# Patient Record
Sex: MALE | Race: WHITE | NOT HISPANIC OR LATINO
[De-identification: names, ages, dates, MRNs, and addresses within clinical notes are randomized per-mention and may not be internally consistent; named-entity substitution may affect disease eponyms.]

---

## 2017-12-15 ENCOUNTER — APPOINTMENT (OUTPATIENT)
Dept: FAMILY MEDICINE | Facility: CLINIC | Age: 52
End: 2017-12-15
Payer: COMMERCIAL

## 2017-12-15 VITALS
HEIGHT: 67 IN | SYSTOLIC BLOOD PRESSURE: 130 MMHG | TEMPERATURE: 97.8 F | BODY MASS INDEX: 40.81 KG/M2 | DIASTOLIC BLOOD PRESSURE: 82 MMHG | WEIGHT: 260 LBS

## 2017-12-15 DIAGNOSIS — J45.909 UNSPECIFIED ASTHMA, UNCOMPLICATED: ICD-10-CM

## 2017-12-15 PROBLEM — Z00.00 ENCOUNTER FOR PREVENTIVE HEALTH EXAMINATION: Status: ACTIVE | Noted: 2017-12-15

## 2017-12-15 PROCEDURE — 99214 OFFICE O/P EST MOD 30 MIN: CPT

## 2018-01-03 ENCOUNTER — APPOINTMENT (OUTPATIENT)
Dept: FAMILY MEDICINE | Facility: CLINIC | Age: 53
End: 2018-01-03
Payer: COMMERCIAL

## 2018-01-05 ENCOUNTER — APPOINTMENT (OUTPATIENT)
Dept: FAMILY MEDICINE | Facility: CLINIC | Age: 53
End: 2018-01-05
Payer: COMMERCIAL

## 2018-01-05 ENCOUNTER — NON-APPOINTMENT (OUTPATIENT)
Age: 53
End: 2018-01-05

## 2018-01-05 VITALS
HEIGHT: 67 IN | OXYGEN SATURATION: 96 % | DIASTOLIC BLOOD PRESSURE: 80 MMHG | HEART RATE: 53 BPM | SYSTOLIC BLOOD PRESSURE: 130 MMHG | TEMPERATURE: 98 F

## 2018-01-05 DIAGNOSIS — Z87.891 PERSONAL HISTORY OF NICOTINE DEPENDENCE: ICD-10-CM

## 2018-01-05 PROCEDURE — 93000 ELECTROCARDIOGRAM COMPLETE: CPT

## 2018-01-05 PROCEDURE — 99214 OFFICE O/P EST MOD 30 MIN: CPT | Mod: 25

## 2018-04-27 ENCOUNTER — APPOINTMENT (OUTPATIENT)
Dept: FAMILY MEDICINE | Facility: CLINIC | Age: 53
End: 2018-04-27
Payer: COMMERCIAL

## 2018-04-27 VITALS
BODY MASS INDEX: 40.49 KG/M2 | HEIGHT: 67 IN | SYSTOLIC BLOOD PRESSURE: 142 MMHG | WEIGHT: 258 LBS | DIASTOLIC BLOOD PRESSURE: 100 MMHG

## 2018-04-27 DIAGNOSIS — G47.33 OBSTRUCTIVE SLEEP APNEA (ADULT) (PEDIATRIC): ICD-10-CM

## 2018-04-27 DIAGNOSIS — Z12.11 ENCOUNTER FOR SCREENING FOR MALIGNANT NEOPLASM OF COLON: ICD-10-CM

## 2018-04-27 DIAGNOSIS — Z99.89 OBSTRUCTIVE SLEEP APNEA (ADULT) (PEDIATRIC): ICD-10-CM

## 2018-04-27 PROCEDURE — 36415 COLL VENOUS BLD VENIPUNCTURE: CPT

## 2018-04-27 PROCEDURE — 99214 OFFICE O/P EST MOD 30 MIN: CPT | Mod: 25

## 2018-04-27 RX ORDER — GUAIFENESIN AND CODEINE PHOSPHATE 10; 100 MG/5ML; MG/5ML
100-10 SOLUTION ORAL
Qty: 1 | Refills: 1 | Status: DISCONTINUED | COMMUNITY
Start: 2017-12-15 | End: 2018-04-27

## 2018-04-27 RX ORDER — CEFUROXIME AXETIL 500 MG/1
500 TABLET ORAL
Qty: 14 | Refills: 0 | Status: DISCONTINUED | COMMUNITY
Start: 2017-12-15 | End: 2018-04-27

## 2018-05-25 LAB
ALBUMIN SERPL ELPH-MCNC: 4.2 G/DL
ALP BLD-CCNC: 40 U/L
ALT SERPL-CCNC: 29 U/L
ANION GAP SERPL CALC-SCNC: 16 MMOL/L
AST SERPL-CCNC: 59 U/L
BASOPHILS # BLD AUTO: 0.03 K/UL
BASOPHILS NFR BLD AUTO: 0.5 %
BILIRUB SERPL-MCNC: 1 MG/DL
BUN SERPL-MCNC: 11 MG/DL
CALCIUM SERPL-MCNC: 9 MG/DL
CHLORIDE SERPL-SCNC: 100 MMOL/L
CHOLEST SERPL-MCNC: 202 MG/DL
CHOLEST/HDLC SERPL: 3 RATIO
CO2 SERPL-SCNC: 25 MMOL/L
CREAT SERPL-MCNC: 0.77 MG/DL
EOSINOPHIL # BLD AUTO: 0.2 K/UL
EOSINOPHIL NFR BLD AUTO: 3.6 %
ESTIMATED AVERAGE GLUCOSE: 117 MG/DL
GLUCOSE SERPL-MCNC: 73 MG/DL
HBA1C MFR BLD HPLC: 5.7 %
HCT VFR BLD CALC: 45.9 %
HDLC SERPL-MCNC: 67 MG/DL
HGB BLD-MCNC: 15.3 G/DL
IMM GRANULOCYTES NFR BLD AUTO: 0 %
LDLC SERPL CALC-MCNC: 108 MG/DL
LYMPHOCYTES # BLD AUTO: 1.35 K/UL
LYMPHOCYTES NFR BLD AUTO: 24.6 %
MAN DIFF?: NORMAL
MCHC RBC-ENTMCNC: 33.3 GM/DL
MCHC RBC-ENTMCNC: 33.8 PG
MCV RBC AUTO: 101.3 FL
MONOCYTES # BLD AUTO: 0.47 K/UL
MONOCYTES NFR BLD AUTO: 8.6 %
NEUTROPHILS # BLD AUTO: 3.43 K/UL
NEUTROPHILS NFR BLD AUTO: 62.7 %
PLATELET # BLD AUTO: 175 K/UL
POTASSIUM SERPL-SCNC: 4.2 MMOL/L
PROT SERPL-MCNC: 7.2 G/DL
PSA SERPL-MCNC: 0.89 NG/ML
RBC # BLD: 4.53 M/UL
RBC # FLD: 12.6 %
SODIUM SERPL-SCNC: 141 MMOL/L
TRIGL SERPL-MCNC: 137 MG/DL
WBC # FLD AUTO: 5.48 K/UL

## 2018-07-27 ENCOUNTER — APPOINTMENT (OUTPATIENT)
Dept: FAMILY MEDICINE | Facility: CLINIC | Age: 53
End: 2018-07-27
Payer: COMMERCIAL

## 2018-07-27 VITALS
WEIGHT: 254 LBS | SYSTOLIC BLOOD PRESSURE: 140 MMHG | BODY MASS INDEX: 39.87 KG/M2 | DIASTOLIC BLOOD PRESSURE: 84 MMHG | HEIGHT: 67 IN

## 2018-07-27 PROCEDURE — 99214 OFFICE O/P EST MOD 30 MIN: CPT

## 2018-07-27 NOTE — HISTORY OF PRESENT ILLNESS
[FreeTextEntry1] : the patient is here for a follow up to get prescription to make him stop drinking. cardiologist referred him to come to primary [de-identified] : as per cc/ Was drinking about half a bottle of Tequila daily. Last etoh consumption was rum 3 days ago

## 2018-07-27 NOTE — REVIEW OF SYSTEMS
[Fatigue] : fatigue [Palpitations] : palpitations [Lower Ext Edema] : lower extremity edema [Paroysmal Nocturnal Dyspnea] : paroysmal nocturnal dyspnea [Shortness Of Breath] : shortness of breath [Dyspnea on Exertion] : dyspnea on exertion

## 2018-07-27 NOTE — PLAN
[FreeTextEntry1] : librium 25 tid for 5 days then bid for 5 days and rto. Advised counseling but states his work schedule makes it difficult to go

## 2018-07-27 NOTE — PHYSICAL EXAM
[No Acute Distress] : no acute distress [Well-Appearing] : well-appearing [No JVD] : no jugular venous distention [Supple] : supple [No Respiratory Distress] : no respiratory distress  [Clear to Auscultation] : lungs were clear to auscultation bilaterally [No Carotid Bruits] : no carotid bruits [de-identified] : obese [de-identified] : irreg irreg.

## 2018-08-24 ENCOUNTER — APPOINTMENT (OUTPATIENT)
Dept: FAMILY MEDICINE | Facility: CLINIC | Age: 53
End: 2018-08-24

## 2019-02-22 ENCOUNTER — RX RENEWAL (OUTPATIENT)
Age: 54
End: 2019-02-22

## 2019-05-09 ENCOUNTER — APPOINTMENT (OUTPATIENT)
Dept: FAMILY MEDICINE | Facility: CLINIC | Age: 54
End: 2019-05-09
Payer: COMMERCIAL

## 2019-05-09 VITALS
WEIGHT: 230 LBS | SYSTOLIC BLOOD PRESSURE: 80 MMHG | DIASTOLIC BLOOD PRESSURE: 42 MMHG | BODY MASS INDEX: 36.1 KG/M2 | HEIGHT: 67 IN

## 2019-05-09 DIAGNOSIS — R42 DIZZINESS AND GIDDINESS: ICD-10-CM

## 2019-05-09 DIAGNOSIS — I95.9 HYPOTENSION, UNSPECIFIED: ICD-10-CM

## 2019-05-09 PROCEDURE — 99214 OFFICE O/P EST MOD 30 MIN: CPT | Mod: 25

## 2019-05-09 PROCEDURE — 36415 COLL VENOUS BLD VENIPUNCTURE: CPT

## 2019-05-09 RX ORDER — CHLORDIAZEPOXIDE HYDROCHLORIDE 25 MG/1
25 CAPSULE ORAL 3 TIMES DAILY
Qty: 60 | Refills: 0 | Status: DISCONTINUED | COMMUNITY
Start: 2018-07-27 | End: 2019-05-09

## 2019-05-11 LAB
ALBUMIN SERPL ELPH-MCNC: 4.1 G/DL
ALP BLD-CCNC: 42 U/L
ALT SERPL-CCNC: 42 U/L
ANION GAP SERPL CALC-SCNC: 24 MMOL/L
AST SERPL-CCNC: 90 U/L
BASOPHILS # BLD AUTO: 0.07 K/UL
BASOPHILS NFR BLD AUTO: 0.8 %
BILIRUB SERPL-MCNC: 1.1 MG/DL
BUN SERPL-MCNC: 52 MG/DL
CALCIUM SERPL-MCNC: 9.7 MG/DL
CHLORIDE SERPL-SCNC: 82 MMOL/L
CHOLEST SERPL-MCNC: 150 MG/DL
CHOLEST/HDLC SERPL: 2.7 RATIO
CO2 SERPL-SCNC: 24 MMOL/L
CREAT SERPL-MCNC: 2.9 MG/DL
EOSINOPHIL # BLD AUTO: 0.44 K/UL
EOSINOPHIL NFR BLD AUTO: 5 %
ESTIMATED AVERAGE GLUCOSE: 97 MG/DL
FOLATE SERPL-MCNC: 3.8 NG/ML
GLUCOSE SERPL-MCNC: 83 MG/DL
HBA1C MFR BLD HPLC: 5 %
HCT VFR BLD CALC: 44.6 %
HDLC SERPL-MCNC: 55 MG/DL
HGB BLD-MCNC: 15.3 G/DL
IMM GRANULOCYTES NFR BLD AUTO: 0.6 %
LDLC SERPL CALC-MCNC: 72 MG/DL
LYMPHOCYTES # BLD AUTO: 1.51 K/UL
LYMPHOCYTES NFR BLD AUTO: 17.1 %
MAN DIFF?: NORMAL
MCHC RBC-ENTMCNC: 34.3 GM/DL
MCHC RBC-ENTMCNC: 35.1 PG
MCV RBC AUTO: 102.3 FL
MONOCYTES # BLD AUTO: 0.71 K/UL
MONOCYTES NFR BLD AUTO: 8 %
NEUTROPHILS # BLD AUTO: 6.05 K/UL
NEUTROPHILS NFR BLD AUTO: 68.5 %
PLATELET # BLD AUTO: 284 K/UL
POTASSIUM SERPL-SCNC: 4.1 MMOL/L
PROT SERPL-MCNC: 7.3 G/DL
RBC # BLD: 4.36 M/UL
RBC # FLD: 12 %
SODIUM SERPL-SCNC: 130 MMOL/L
TRIGL SERPL-MCNC: 115 MG/DL
VIT B12 SERPL-MCNC: 794 PG/ML
WBC # FLD AUTO: 8.83 K/UL

## 2019-05-13 NOTE — PHYSICAL EXAM
[No Acute Distress] : no acute distress [Well-Appearing] : well-appearing [No JVD] : no jugular venous distention [Supple] : supple [No Respiratory Distress] : no respiratory distress  [Clear to Auscultation] : lungs were clear to auscultation bilaterally [No Carotid Bruits] : no carotid bruits [de-identified] : obese [de-identified] : irreg irreg.

## 2019-05-13 NOTE — HISTORY OF PRESENT ILLNESS
[de-identified] : patient has been feeling dizziness since Tuesday. b/p was very low. he reports both his hands feel very numb, he thinks its from the low blood pressure. \par \par states he has stopped etoh [FreeTextEntry1] : patient is here for blood work\par

## 2019-05-13 NOTE — REVIEW OF SYSTEMS
[Fatigue] : fatigue [Palpitations] : palpitations [Lower Ext Edema] : lower extremity edema [Shortness Of Breath] : shortness of breath [Paroysmal Nocturnal Dyspnea] : paroysmal nocturnal dyspnea [Dyspnea on Exertion] : dyspnea on exertion

## 2019-05-14 LAB
CK BB SERPL ELPH-CCNC: 0 % (ref 0–?)
CK MB CFR SERPL ELPH: 0 %
CK MM SERPL ELPH-CCNC: 100 %
CREATINE KINASE,TOTAL,SERUM: 60 U/L
MACRO TYPE 1: 0 %
MACRO TYPE 2: 0 %

## 2019-05-15 ENCOUNTER — LABORATORY RESULT (OUTPATIENT)
Age: 54
End: 2019-05-15

## 2019-05-15 ENCOUNTER — APPOINTMENT (OUTPATIENT)
Dept: NEPHROLOGY | Facility: CLINIC | Age: 54
End: 2019-05-15
Payer: COMMERCIAL

## 2019-05-15 VITALS
WEIGHT: 230 LBS | SYSTOLIC BLOOD PRESSURE: 124 MMHG | DIASTOLIC BLOOD PRESSURE: 68 MMHG | BODY MASS INDEX: 36.1 KG/M2 | HEIGHT: 67 IN

## 2019-05-15 DIAGNOSIS — F41.9 ANXIETY DISORDER, UNSPECIFIED: ICD-10-CM

## 2019-05-15 PROCEDURE — 99245 OFF/OP CONSLTJ NEW/EST HI 55: CPT

## 2019-05-15 NOTE — PHYSICAL EXAM
[General Appearance - Alert] : alert [Neck Appearance] : the appearance of the neck was normal [Outer Ear] : the ears and nose were normal in appearance [Sclera] : the sclera and conjunctiva were normal [] : no respiratory distress [Neck Cervical Mass (___cm)] : no neck mass was observed [Heart Rate And Rhythm] : heart rate was normal and rhythm regular [Respiration, Rhythm And Depth] : normal respiratory rhythm and effort [Auscultation Breath Sounds / Voice Sounds] : lungs were clear to auscultation bilaterally [Arterial Pulses Carotid] : carotid pulses were normal with no bruits [Bowel Sounds] : normal bowel sounds [Heart Sounds] : normal S1 and S2 [Abdomen Tenderness] : non-tender [Abdomen Soft] : soft [Cervical Lymph Nodes Enlarged Posterior Bilaterally] : posterior cervical [Abnormal Walk] : normal gait [Skin Color & Pigmentation] : normal skin color and pigmentation [Cranial Nerves] : cranial nerves 2-12 were intact [Oriented To Time, Place, And Person] : oriented to person, place, and time

## 2019-05-15 NOTE — HISTORY OF PRESENT ILLNESS
[FreeTextEntry1] : Patient is a 53 year old male with history of HTN for four years, AFib on digoxin, chronic ETOH use of 2 bottles of vodka a week, eats one meal daily; here for initial evaluation of elevated SCr and hyponatremia. Patient states he drastically changed his diet and changed his BP meds recently; was on bisoprolol-HCTZ and lisinopril - HCTZ. Was hypotensive last week at home; until he came to see Dr. Bar who held his BP meds. No complaints; feels much better now; reports low water intake.

## 2019-05-15 NOTE — ASSESSMENT
[FreeTextEntry1] : 1) ATN\par 2) Hypotension\par 3) Hyponatremia\par 4) Acidosis\par \par ATN in the setting of hypotension; low perfusion\par Appropriately held ACEI and HCTZ\par Hyponatremia likely from HCTZ;\par Continue holding BP meds;\par Renally dose digoxin; seeing cardiologist tonight\par Will repeat blood work; renal panel, UA, urine pro/cr ratio\par May need reintroduction of BP meds eventually\par US renal\par \par RTC 2 weeks

## 2019-05-15 NOTE — REVIEW OF SYSTEMS
[Fever] : no fever [Eye Pain] : no eye pain [Chills] : no chills [Red Eyes] : eyes not red [Earache] : no earache [Loss Of Hearing] : no hearing loss [Heart Rate Is Fast] : the heart rate was not fast [Heart Rate Is Slow] : the heart rate was not slow [Wheezing] : no wheezing [Shortness Of Breath] : no shortness of breath [Abdominal Pain] : no abdominal pain [Vomiting] : no vomiting [Arthralgias] : no arthralgias [Skin Lesions] : no skin lesions [Skin Wound] : no skin wound [Joint Pain] : no joint pain [Proptosis] : no proptosis [Confused] : no confusion [Convulsions] : no convulsions [Hot Flashes] : no hot flashes [Easy Bleeding] : no tendency for easy bleeding [Easy Bruising] : no tendency for easy bruising [Negative] : Endocrine

## 2019-05-16 LAB
ALBUMIN SERPL ELPH-MCNC: 4.3 G/DL
ANION GAP SERPL CALC-SCNC: 21 MMOL/L
BASOPHILS # BLD AUTO: 0.07 K/UL
BASOPHILS NFR BLD AUTO: 0.9 %
BUN SERPL-MCNC: 19 MG/DL
CALCIUM SERPL-MCNC: 9.8 MG/DL
CHLORIDE SERPL-SCNC: 90 MMOL/L
CO2 SERPL-SCNC: 24 MMOL/L
CREAT SERPL-MCNC: 1.08 MG/DL
EOSINOPHIL # BLD AUTO: 0.42 K/UL
EOSINOPHIL NFR BLD AUTO: 5.5 %
GLUCOSE SERPL-MCNC: 86 MG/DL
HCT VFR BLD CALC: 43.1 %
HGB BLD-MCNC: 14.2 G/DL
IMM GRANULOCYTES NFR BLD AUTO: 0.4 %
LYMPHOCYTES # BLD AUTO: 1.85 K/UL
LYMPHOCYTES NFR BLD AUTO: 24 %
MAN DIFF?: NORMAL
MCHC RBC-ENTMCNC: 32.9 GM/DL
MCHC RBC-ENTMCNC: 35.1 PG
MCV RBC AUTO: 106.7 FL
MONOCYTES # BLD AUTO: 0.65 K/UL
MONOCYTES NFR BLD AUTO: 8.4 %
NEUTROPHILS # BLD AUTO: 4.68 K/UL
NEUTROPHILS NFR BLD AUTO: 60.8 %
PHOSPHATE SERPL-MCNC: 3.5 MG/DL
PLATELET # BLD AUTO: 205 K/UL
POTASSIUM SERPL-SCNC: 4.5 MMOL/L
RBC # BLD: 4.04 M/UL
RBC # FLD: 12 %
SODIUM SERPL-SCNC: 134 MMOL/L
WBC # FLD AUTO: 7.7 K/UL

## 2019-05-22 ENCOUNTER — FORM ENCOUNTER (OUTPATIENT)
Age: 54
End: 2019-05-22

## 2019-05-23 ENCOUNTER — OUTPATIENT (OUTPATIENT)
Dept: OUTPATIENT SERVICES | Facility: HOSPITAL | Age: 54
LOS: 1 days | End: 2019-05-23
Payer: COMMERCIAL

## 2019-05-23 ENCOUNTER — APPOINTMENT (OUTPATIENT)
Dept: ULTRASOUND IMAGING | Facility: CLINIC | Age: 54
End: 2019-05-23
Payer: COMMERCIAL

## 2019-05-23 DIAGNOSIS — Z00.8 ENCOUNTER FOR OTHER GENERAL EXAMINATION: ICD-10-CM

## 2019-05-23 PROCEDURE — 76775 US EXAM ABDO BACK WALL LIM: CPT | Mod: 26

## 2019-05-23 PROCEDURE — 76775 US EXAM ABDO BACK WALL LIM: CPT

## 2019-06-19 ENCOUNTER — APPOINTMENT (OUTPATIENT)
Dept: NEPHROLOGY | Facility: CLINIC | Age: 54
End: 2019-06-19
Payer: COMMERCIAL

## 2019-06-19 VITALS
WEIGHT: 230 LBS | HEIGHT: 67 IN | DIASTOLIC BLOOD PRESSURE: 68 MMHG | BODY MASS INDEX: 36.1 KG/M2 | SYSTOLIC BLOOD PRESSURE: 130 MMHG

## 2019-06-19 PROCEDURE — 99215 OFFICE O/P EST HI 40 MIN: CPT

## 2019-06-19 NOTE — ASSESSMENT
[FreeTextEntry1] : 1) ATN\par 2) Hypotension\par 3) Hyponatremia\par 4) Acidosis\par \par ATN in the setting of hypotension; low perfusion\par Appropriately held ACEI and HCTZ\par Hyponatremia likely from HCTZ;\par Renally dose digoxin; seeing cardiologist tonight\par BP well controlled on metoprolol\par Will not increase dose\par Cr normalized\par Na improved\par Renal US normal\par \par RTC PRN

## 2019-06-19 NOTE — REVIEW OF SYSTEMS
[Fever] : no fever [Chills] : no chills [Eye Pain] : no eye pain [Red Eyes] : eyes not red [Earache] : no earache [Loss Of Hearing] : no hearing loss [Heart Rate Is Slow] : the heart rate was not slow [Heart Rate Is Fast] : the heart rate was not fast [Shortness Of Breath] : no shortness of breath [Wheezing] : no wheezing [Abdominal Pain] : no abdominal pain [Vomiting] : no vomiting [Arthralgias] : no arthralgias [Joint Pain] : no joint pain [Skin Lesions] : no skin lesions [Skin Wound] : no skin wound [Confused] : no confusion [Hot Flashes] : no hot flashes [Convulsions] : no convulsions [Proptosis] : no proptosis [Easy Bleeding] : no tendency for easy bleeding [Easy Bruising] : no tendency for easy bruising [Negative] : Heme/Lymph

## 2019-06-19 NOTE — PHYSICAL EXAM
[Sclera] : the sclera and conjunctiva were normal [General Appearance - Alert] : alert [Outer Ear] : the ears and nose were normal in appearance [Neck Appearance] : the appearance of the neck was normal [Neck Cervical Mass (___cm)] : no neck mass was observed [] : no respiratory distress [Respiration, Rhythm And Depth] : normal respiratory rhythm and effort [Heart Rate And Rhythm] : heart rate was normal and rhythm regular [Auscultation Breath Sounds / Voice Sounds] : lungs were clear to auscultation bilaterally [Heart Sounds] : normal S1 and S2 [Arterial Pulses Carotid] : carotid pulses were normal with no bruits [Abdomen Soft] : soft [Abdomen Tenderness] : non-tender [Bowel Sounds] : normal bowel sounds [Cervical Lymph Nodes Enlarged Posterior Bilaterally] : posterior cervical [Abnormal Walk] : normal gait [Skin Color & Pigmentation] : normal skin color and pigmentation [Cranial Nerves] : cranial nerves 2-12 were intact [Oriented To Time, Place, And Person] : oriented to person, place, and time

## 2019-07-08 ENCOUNTER — APPOINTMENT (OUTPATIENT)
Dept: FAMILY MEDICINE | Facility: CLINIC | Age: 54
End: 2019-07-08
Payer: COMMERCIAL

## 2019-07-08 VITALS
WEIGHT: 230 LBS | BODY MASS INDEX: 36.1 KG/M2 | SYSTOLIC BLOOD PRESSURE: 130 MMHG | DIASTOLIC BLOOD PRESSURE: 80 MMHG | HEIGHT: 67 IN

## 2019-07-08 DIAGNOSIS — G47.00 INSOMNIA, UNSPECIFIED: ICD-10-CM

## 2019-07-08 PROCEDURE — 99213 OFFICE O/P EST LOW 20 MIN: CPT

## 2019-07-09 NOTE — HISTORY OF PRESENT ILLNESS
[FreeTextEntry8] : Patient is here for fatigue. Pt has not been able to sleep for last few days. Pt states he has been up since friday, has gotten no sleep, was not able to go to work today due to extreme fatigue and high bp

## 2019-07-09 NOTE — PHYSICAL EXAM
[No Carotid Bruits] : no carotid bruits [Normal] : normal rate, regular rhythm, normal S1 and S2 and no murmur heard [No Edema] : there was no peripheral edema

## 2019-07-09 NOTE — REVIEW OF SYSTEMS
[Fatigue] : fatigue [Negative] : Respiratory [Fever] : no fever [Hot Flashes] : no hot flashes [Chills] : no chills [Night Sweats] : no night sweats [Recent Change In Weight] : ~T no recent weight change [FreeTextEntry4] : Has dental pain and will be having dental work

## 2019-07-09 NOTE — PLAN
[FreeTextEntry1] : Pt is positive for alcoholism. States he has cut down\par Will let me know if hydroxyzine works.

## 2019-07-23 ENCOUNTER — RECORD ABSTRACTING (OUTPATIENT)
Age: 54
End: 2019-07-23

## 2019-07-23 DIAGNOSIS — J30.2 OTHER SEASONAL ALLERGIC RHINITIS: ICD-10-CM

## 2019-07-23 DIAGNOSIS — F17.200 NICOTINE DEPENDENCE, UNSPECIFIED, UNCOMPLICATED: ICD-10-CM

## 2019-07-23 DIAGNOSIS — Z87.898 PERSONAL HISTORY OF OTHER SPECIFIED CONDITIONS: ICD-10-CM

## 2019-07-23 DIAGNOSIS — Z80.1 FAMILY HISTORY OF MALIGNANT NEOPLASM OF TRACHEA, BRONCHUS AND LUNG: ICD-10-CM

## 2019-07-23 DIAGNOSIS — Z78.9 OTHER SPECIFIED HEALTH STATUS: ICD-10-CM

## 2019-07-23 DIAGNOSIS — Z92.89 PERSONAL HISTORY OF OTHER MEDICAL TREATMENT: ICD-10-CM

## 2019-07-23 DIAGNOSIS — Z86.69 PERSONAL HISTORY OF OTHER DISEASES OF THE NERVOUS SYSTEM AND SENSE ORGANS: ICD-10-CM

## 2019-07-23 DIAGNOSIS — Z80.6 FAMILY HISTORY OF LEUKEMIA: ICD-10-CM

## 2019-07-23 DIAGNOSIS — Z80.0 FAMILY HISTORY OF MALIGNANT NEOPLASM OF DIGESTIVE ORGANS: ICD-10-CM

## 2019-07-23 DIAGNOSIS — Z80.8 FAMILY HISTORY OF MALIGNANT NEOPLASM OF OTHER ORGANS OR SYSTEMS: ICD-10-CM

## 2019-07-23 RX ORDER — BACILLUS COAGULANS/INULIN 1B-250 MG
CAPSULE ORAL
Refills: 0 | Status: ACTIVE | COMMUNITY

## 2019-07-23 RX ORDER — CHORIONIC GONADOTROPIN, HUMAN 50000 UNIT
VIAL (EA) INJECTION
Refills: 0 | Status: ACTIVE | COMMUNITY

## 2019-09-10 ENCOUNTER — APPOINTMENT (OUTPATIENT)
Dept: FAMILY MEDICINE | Facility: CLINIC | Age: 54
End: 2019-09-10
Payer: COMMERCIAL

## 2019-09-10 VITALS
OXYGEN SATURATION: 97 % | WEIGHT: 230 LBS | HEART RATE: 70 BPM | DIASTOLIC BLOOD PRESSURE: 76 MMHG | HEIGHT: 67 IN | BODY MASS INDEX: 36.1 KG/M2 | SYSTOLIC BLOOD PRESSURE: 122 MMHG

## 2019-09-10 PROCEDURE — 99213 OFFICE O/P EST LOW 20 MIN: CPT

## 2019-09-10 NOTE — HISTORY OF PRESENT ILLNESS
[FreeTextEntry8] : Momo francheska 53-year old male who presents with atrial fibrillation. Earlier today he reports that his HR and BP were both elevated. He reports that this occurs sporadically but usually lasts for 20 minutes; however, this time it lasted for 3-4 hours. He feels better right now.\par Reports that nothing specifically triggers these episodes; however, sitting down and relaxing usually helps.\par Has an appointment with cardiology tomorrow. Reports that his cardiologist have wanted him to have a pacemaker placed for a very long, however, he has always refused.\par Reports compliance with medication.\par Asymptomatic currently.\par \par \par patient presents today for high b/p and heart rate. comes and goes. today at 8 am bp was 177/146. heart rate was 146. before coming here 146/107. heart rate was 107

## 2019-09-10 NOTE — PHYSICAL EXAM
[Normal] : no acute distress, well nourished, well developed and well-appearing [Normal Sclera/Conjunctiva] : normal sclera/conjunctiva [No Respiratory Distress] : no respiratory distress  [Normal Outer Ear/Nose] : the outer ears and nose were normal in appearance [No Accessory Muscle Use] : no accessory muscle use [Clear to Auscultation] : lungs were clear to auscultation bilaterally [Normal Rate] : normal rate  [Regular Rhythm] : with a regular rhythm [Normal S1, S2] : normal S1 and S2 [No Edema] : there was no peripheral edema [No Extremity Clubbing/Cyanosis] : no extremity clubbing/cyanosis [Non-distended] : non-distended [Coordination Grossly Intact] : coordination grossly intact [Alert and Oriented x3] : oriented to person, place, and time

## 2019-10-10 ENCOUNTER — LABORATORY RESULT (OUTPATIENT)
Age: 54
End: 2019-10-10

## 2019-10-10 ENCOUNTER — APPOINTMENT (OUTPATIENT)
Dept: FAMILY MEDICINE | Facility: CLINIC | Age: 54
End: 2019-10-10
Payer: COMMERCIAL

## 2019-10-10 DIAGNOSIS — E66.01 MORBID (SEVERE) OBESITY DUE TO EXCESS CALORIES: ICD-10-CM

## 2019-10-10 PROCEDURE — 36415 COLL VENOUS BLD VENIPUNCTURE: CPT

## 2019-10-11 LAB
ALBUMIN SERPL ELPH-MCNC: 4.3 G/DL
ALP BLD-CCNC: 51 U/L
ALT SERPL-CCNC: 32 U/L
ANION GAP SERPL CALC-SCNC: 19 MMOL/L
AST SERPL-CCNC: 92 U/L
BILIRUB SERPL-MCNC: 1 MG/DL
BUN SERPL-MCNC: 12 MG/DL
CALCIUM SERPL-MCNC: 9.4 MG/DL
CHLORIDE SERPL-SCNC: 92 MMOL/L
CHOLEST SERPL-MCNC: 147 MG/DL
CHOLEST/HDLC SERPL: 2.5 RATIO
CK SERPL-CCNC: 54 U/L
CO2 SERPL-SCNC: 25 MMOL/L
CREAT SERPL-MCNC: 0.72 MG/DL
GLUCOSE SERPL-MCNC: 102 MG/DL
HDLC SERPL-MCNC: 60 MG/DL
LDLC SERPL CALC-MCNC: 71 MG/DL
POTASSIUM SERPL-SCNC: 3.8 MMOL/L
PROT SERPL-MCNC: 6.8 G/DL
SODIUM SERPL-SCNC: 136 MMOL/L
TRIGL SERPL-MCNC: 79 MG/DL

## 2019-10-16 ENCOUNTER — RX RENEWAL (OUTPATIENT)
Age: 54
End: 2019-10-16

## 2019-10-25 LAB
BASOPHILS # BLD AUTO: 0.15 K/UL
BASOPHILS NFR BLD AUTO: 2.7 %
EOSINOPHIL # BLD AUTO: 0.25 K/UL
EOSINOPHIL NFR BLD AUTO: 4.5 %
ESTIMATED AVERAGE GLUCOSE: 105 MG/DL
HBA1C MFR BLD HPLC: 5.3 %
HCT VFR BLD CALC: 40.2 %
HGB BLD-MCNC: 13.4 G/DL
LYMPHOCYTES # BLD AUTO: 1.19 K/UL
LYMPHOCYTES NFR BLD AUTO: 21.4 %
MAN DIFF?: NORMAL
MCHC RBC-ENTMCNC: 33.3 GM/DL
MCHC RBC-ENTMCNC: 35.9 PG
MCV RBC AUTO: 107.8 FL
MONOCYTES # BLD AUTO: 0.34 K/UL
MONOCYTES NFR BLD AUTO: 6.2 %
NEUTROPHILS # BLD AUTO: 3.61 K/UL
NEUTROPHILS NFR BLD AUTO: 65.2 %
PLATELET # BLD AUTO: 173 K/UL
RBC # BLD: 3.73 M/UL
RBC # FLD: 13.6 %
WBC # FLD AUTO: 5.54 K/UL

## 2020-02-17 ENCOUNTER — APPOINTMENT (OUTPATIENT)
Dept: FAMILY MEDICINE | Facility: CLINIC | Age: 55
End: 2020-02-17
Payer: SELF-PAY

## 2020-02-17 VITALS
DIASTOLIC BLOOD PRESSURE: 74 MMHG | BODY MASS INDEX: 35.79 KG/M2 | WEIGHT: 228 LBS | OXYGEN SATURATION: 96 % | HEART RATE: 97 BPM | HEIGHT: 67 IN | SYSTOLIC BLOOD PRESSURE: 130 MMHG

## 2020-02-17 DIAGNOSIS — Z87.09 PERSONAL HISTORY OF OTHER DISEASES OF THE RESPIRATORY SYSTEM: ICD-10-CM

## 2020-02-17 PROCEDURE — 99214 OFFICE O/P EST MOD 30 MIN: CPT

## 2020-02-17 NOTE — HISTORY OF PRESENT ILLNESS
[Cough] : cough [Wheezing] : no wheezing [Shortness Of Breath] : no shortness of breath [Sore Throat] : no sore throat [Fever] : no fever [de-identified] : bronchitis  [Headache] : no headache [FreeTextEntry1] : a week ago. [FreeTextEntry8] : Pt is alcoholic, going to [facility] and trying to taper [FreeTextEntry2] : chest congestion/pressure.

## 2020-02-17 NOTE — PLAN
[FreeTextEntry1] : Increase water , dc etoh.\par Pt asking about meds to decrease etoh.\par Advised to schedule for a full physical and will discuss at that time

## 2020-02-17 NOTE — PHYSICAL EXAM
[Ill-Appearing] : ill-appearing [Alert and Oriented x3] : oriented to person, place, and time [Normal] : normal rate, regular rhythm, normal S1 and S2 and no murmur heard [de-identified] : RAMSES [de-identified] : congested [de-identified] : coarse BS bilat, few ronchi and wheezes

## 2020-02-17 NOTE — REVIEW OF SYSTEMS
[Cough] : cough [Wheezing] : wheezing [Anxiety] : anxiety [Insomnia] : insomnia [Negative] : ENT [Shortness Of Breath] : no shortness of breath [FreeTextEntry4] : congested [Suicidal] : not suicidal [Dyspnea on Exertion] : not dyspnea on exertion

## 2020-03-26 RX ORDER — DOXYCYCLINE HYCLATE 100 MG/1
100 CAPSULE ORAL TWICE DAILY
Qty: 10 | Refills: 0 | Status: DISCONTINUED | COMMUNITY
Start: 2020-02-17 | End: 2020-03-26

## 2020-04-03 ENCOUNTER — APPOINTMENT (OUTPATIENT)
Dept: FAMILY MEDICINE | Facility: CLINIC | Age: 55
End: 2020-04-03
Payer: COMMERCIAL

## 2020-04-03 ENCOUNTER — LABORATORY RESULT (OUTPATIENT)
Age: 55
End: 2020-04-03

## 2020-04-03 VITALS
WEIGHT: 222 LBS | HEIGHT: 67 IN | OXYGEN SATURATION: 97 % | DIASTOLIC BLOOD PRESSURE: 90 MMHG | HEART RATE: 103 BPM | BODY MASS INDEX: 34.84 KG/M2 | TEMPERATURE: 98.7 F | SYSTOLIC BLOOD PRESSURE: 118 MMHG

## 2020-04-03 PROCEDURE — 36415 COLL VENOUS BLD VENIPUNCTURE: CPT

## 2020-04-03 PROCEDURE — 99214 OFFICE O/P EST MOD 30 MIN: CPT | Mod: 25

## 2020-04-03 RX ORDER — BISOPROLOL FUMARATE AND HYDROCHLOROTHIAZIDE 10; 6.25 MG/1; MG/1
10-6.25 TABLET, FILM COATED ORAL DAILY
Refills: 0 | Status: DISCONTINUED | COMMUNITY
Start: 2019-05-09 | End: 2020-04-03

## 2020-04-03 NOTE — HISTORY OF PRESENT ILLNESS
[FreeTextEntry8] : Patient has watery eyes, phlegm in chest that is causing dry heaves, started up yesterday. Patient states they shake after these symptoms happen. No appetite. Patient states this has been going on for awhile.\par \par Still drinking but states less.\par \par On metoprolol not bisoprolol\par Other meds unchanged\par Pt has chronic afib and followed by cardiology. Last seen in October

## 2020-04-03 NOTE — REVIEW OF SYSTEMS
[Palpitations] : palpitations [Dyspnea on Exertion] : dyspnea on exertion [Vomiting] : vomiting [Anxiety] : anxiety [Negative] : ENT [Fever] : no fever [Chills] : no chills [Fatigue] : no fatigue [Hot Flashes] : no hot flashes [Night Sweats] : no night sweats [Recent Change In Weight] : ~T no recent weight change [Chest Pain] : no chest pain [Claudication] : no  leg claudication [Lower Ext Edema] : no lower extremity edema [Orthopena] : no orthopnea [Paroxysmal Nocturnal Dyspnea] : no paroxysmal nocturnal dyspnea [Shortness Of Breath] : no shortness of breath [Wheezing] : no wheezing [Cough] : no cough [Abdominal Pain] : no abdominal pain [Nausea] : no nausea [Constipation] : no constipation [Diarrhea] : no diarrhea [Heartburn] : no heartburn [Melena] : no melena [Easy Bruising] : no easy bruising [FreeTextEntry3] : states they ere yellow last week, watery [FreeTextEntry5] : rapid pulse [FreeTextEntry7] : vomits with phlegm

## 2020-04-03 NOTE — PLAN
[FreeTextEntry1] : Continue all meds but increase 100mg metoprolol to BID\par Stop alcohol\par \par Needs to follow up with cardiology

## 2020-04-03 NOTE — PHYSICAL EXAM
[No Acute Distress] : no acute distress [Well Nourished] : well nourished [Well-Appearing] : well-appearing [EOMI] : extraocular movements intact [Normal Outer Ear/Nose] : the outer ears and nose were normal in appearance [Normal Oropharynx] : the oropharynx was normal [No Lymphadenopathy] : no lymphadenopathy [Thyroid Normal, No Nodules] : the thyroid was normal and there were no nodules present [No Respiratory Distress] : no respiratory distress  [Clear to Auscultation] : lungs were clear to auscultation bilaterally [No Carotid Bruits] : no carotid bruits [No Edema] : there was no peripheral edema [Alert and Oriented x3] : oriented to person, place, and time [de-identified] : overwt [de-identified] : Mildly icteric sclera [de-identified] : irreg irreg, rate 110

## 2020-04-06 DIAGNOSIS — R17 UNSPECIFIED JAUNDICE: ICD-10-CM

## 2020-04-06 DIAGNOSIS — R74.8 ABNORMAL LEVELS OF OTHER SERUM ENZYMES: ICD-10-CM

## 2020-04-06 LAB
ALBUMIN SERPL ELPH-MCNC: 3.7 G/DL
ALP BLD-CCNC: 166 U/L
ALT SERPL-CCNC: 33 U/L
ANION GAP SERPL CALC-SCNC: 19 MMOL/L
AST SERPL-CCNC: 189 U/L
BASOPHILS # BLD AUTO: 0.04 K/UL
BASOPHILS NFR BLD AUTO: 0.7 %
BILIRUB SERPL-MCNC: 7.5 MG/DL
BUN SERPL-MCNC: 14 MG/DL
CALCIUM SERPL-MCNC: 8.7 MG/DL
CHLORIDE SERPL-SCNC: 94 MMOL/L
CHOLEST SERPL-MCNC: 129 MG/DL
CHOLEST/HDLC SERPL: 4.2 RATIO
CO2 SERPL-SCNC: 25 MMOL/L
CREAT SERPL-MCNC: 0.71 MG/DL
DIGOXIN SERPL-MCNC: 0.4 NG/ML
EOSINOPHIL # BLD AUTO: 0.03 K/UL
EOSINOPHIL NFR BLD AUTO: 0.5 %
GLUCOSE SERPL-MCNC: 107 MG/DL
HCT VFR BLD CALC: 38.7 %
HDLC SERPL-MCNC: 31 MG/DL
HGB BLD-MCNC: 13.3 G/DL
IMM GRANULOCYTES NFR BLD AUTO: 0.5 %
LDLC SERPL CALC-MCNC: 73 MG/DL
LYMPHOCYTES # BLD AUTO: 0.84 K/UL
LYMPHOCYTES NFR BLD AUTO: 14.1 %
MAN DIFF?: NORMAL
MCHC RBC-ENTMCNC: 34.4 GM/DL
MCHC RBC-ENTMCNC: 38.6 PG
MCV RBC AUTO: 112.2 FL
MONOCYTES # BLD AUTO: 0.49 K/UL
MONOCYTES NFR BLD AUTO: 8.2 %
NEUTROPHILS # BLD AUTO: 4.52 K/UL
NEUTROPHILS NFR BLD AUTO: 76 %
PLATELET # BLD AUTO: 65 K/UL
POTASSIUM SERPL-SCNC: 3.7 MMOL/L
PROT SERPL-MCNC: 6.6 G/DL
RBC # BLD: 3.45 M/UL
RBC # FLD: 14.6 %
SODIUM SERPL-SCNC: 138 MMOL/L
TRIGL SERPL-MCNC: 126 MG/DL
TSH SERPL-ACNC: 9.12 UIU/ML
WBC # FLD AUTO: 5.95 K/UL

## 2020-04-08 ENCOUNTER — OUTPATIENT (OUTPATIENT)
Dept: OUTPATIENT SERVICES | Facility: HOSPITAL | Age: 55
LOS: 1 days | End: 2020-04-08
Payer: COMMERCIAL

## 2020-04-08 ENCOUNTER — APPOINTMENT (OUTPATIENT)
Dept: ULTRASOUND IMAGING | Facility: CLINIC | Age: 55
End: 2020-04-08
Payer: COMMERCIAL

## 2020-04-08 ENCOUNTER — RESULT REVIEW (OUTPATIENT)
Age: 55
End: 2020-04-08

## 2020-04-08 DIAGNOSIS — Z00.8 ENCOUNTER FOR OTHER GENERAL EXAMINATION: ICD-10-CM

## 2020-04-08 PROCEDURE — 76700 US EXAM ABDOM COMPLETE: CPT

## 2020-04-08 PROCEDURE — 76700 US EXAM ABDOM COMPLETE: CPT | Mod: 26

## 2020-04-24 ENCOUNTER — RX RENEWAL (OUTPATIENT)
Age: 55
End: 2020-04-24

## 2020-07-23 ENCOUNTER — APPOINTMENT (OUTPATIENT)
Dept: FAMILY MEDICINE | Facility: CLINIC | Age: 55
End: 2020-07-23
Payer: COMMERCIAL

## 2020-07-23 VITALS
HEIGHT: 67 IN | DIASTOLIC BLOOD PRESSURE: 90 MMHG | SYSTOLIC BLOOD PRESSURE: 150 MMHG | OXYGEN SATURATION: 98 % | WEIGHT: 204 LBS | HEART RATE: 90 BPM | TEMPERATURE: 97.3 F | BODY MASS INDEX: 32.02 KG/M2

## 2020-07-23 DIAGNOSIS — H65.93 UNSPECIFIED NONSUPPURATIVE OTITIS MEDIA, BILATERAL: ICD-10-CM

## 2020-07-23 PROCEDURE — 99214 OFFICE O/P EST MOD 30 MIN: CPT

## 2020-07-26 PROBLEM — H65.93 BILATERAL SEROUS OTITIS MEDIA, UNSPECIFIED CHRONICITY: Status: ACTIVE | Noted: 2020-07-26

## 2020-07-26 NOTE — REVIEW OF SYSTEMS
[Palpitations] : palpitations [Dyspnea on Exertion] : dyspnea on exertion [Dizziness] : dizziness [Anxiety] : anxiety [Negative] : ENT [Fever] : no fever [Chills] : no chills [Fatigue] : no fatigue [Hot Flashes] : no hot flashes [Night Sweats] : no night sweats [Recent Change In Weight] : ~T no recent weight change [Chest Pain] : no chest pain [Claudication] : no  leg claudication [Lower Ext Edema] : no lower extremity edema [Orthopena] : no orthopnea [Paroxysmal Nocturnal Dyspnea] : no paroxysmal nocturnal dyspnea [Shortness Of Breath] : no shortness of breath [Wheezing] : no wheezing [Cough] : no cough [Diarrhea] : no diarrhea [Nausea] : no nausea [Constipation] : no constipation [Abdominal Pain] : no abdominal pain [Vomiting] : no vomiting [Heartburn] : no heartburn [Melena] : no melena [FreeTextEntry5] : rapid pulse [FreeTextEntry3] : states they ere yellow last week, watery [Easy Bruising] : no easy bruising

## 2020-07-26 NOTE — PHYSICAL EXAM
[Normal] : no carotid or abdominal bruits heard, no varicosities, pedal pulses are present, no peripheral edema, no extremity clubbing or cyanosis and no palpable aorta [de-identified] : CONGESTION AND BULGING TM

## 2020-10-24 ENCOUNTER — FORM ENCOUNTER (OUTPATIENT)
Age: 55
End: 2020-10-24

## 2020-10-25 ENCOUNTER — NON-APPOINTMENT (OUTPATIENT)
Age: 55
End: 2020-10-25

## 2020-10-25 ENCOUNTER — RESULT REVIEW (OUTPATIENT)
Age: 55
End: 2020-10-25

## 2020-10-25 ENCOUNTER — TRANSCRIPTION ENCOUNTER (OUTPATIENT)
Age: 55
End: 2020-10-25

## 2020-11-12 ENCOUNTER — RESULT REVIEW (OUTPATIENT)
Age: 55
End: 2020-11-12

## 2020-12-14 ENCOUNTER — APPOINTMENT (OUTPATIENT)
Dept: FAMILY MEDICINE | Facility: CLINIC | Age: 55
End: 2020-12-14
Payer: COMMERCIAL

## 2020-12-14 VITALS
SYSTOLIC BLOOD PRESSURE: 142 MMHG | DIASTOLIC BLOOD PRESSURE: 80 MMHG | HEART RATE: 108 BPM | TEMPERATURE: 96.8 F | HEIGHT: 67 IN | BODY MASS INDEX: 33.59 KG/M2 | WEIGHT: 214 LBS | OXYGEN SATURATION: 95 %

## 2020-12-14 DIAGNOSIS — R06.00 DYSPNEA, UNSPECIFIED: ICD-10-CM

## 2020-12-14 PROCEDURE — 36415 COLL VENOUS BLD VENIPUNCTURE: CPT

## 2020-12-14 PROCEDURE — 99072 ADDL SUPL MATRL&STAF TM PHE: CPT

## 2020-12-14 PROCEDURE — 99214 OFFICE O/P EST MOD 30 MIN: CPT | Mod: 25

## 2020-12-14 NOTE — PHYSICAL EXAM
[No Acute Distress] : no acute distress [Ill-Appearing] : ill-appearing [Normal] : normal rate, regular rhythm, normal S1 and S2 and no murmur heard [de-identified] : abdomin enlarged and pos ascites

## 2020-12-14 NOTE — HISTORY OF PRESENT ILLNESS
[FreeTextEntry8] : pt being seen for stomach pain and SOB\par pt has been tapped twice for ascites sec to etoh liver disease.\par Still was drinking up until 4 days ago!\par \par CC; just doesn’t feel well.\par Breathing heavy going up stairs\par \par Doesnt know exactly what meds he is taking

## 2020-12-14 NOTE — REVIEW OF SYSTEMS
[Palpitations] : palpitations [Dyspnea on Exertion] : dyspnea on exertion [Abdominal Pain] : abdominal pain [Nausea] : nausea [Dizziness] : dizziness [Anxiety] : anxiety [Negative] : Genitourinary [Fever] : no fever [Chills] : no chills [Fatigue] : no fatigue [Hot Flashes] : no hot flashes [Night Sweats] : no night sweats [Recent Change In Weight] : ~T no recent weight change [Chest Pain] : no chest pain [Claudication] : no  leg claudication [Lower Ext Edema] : no lower extremity edema [Orthopena] : no orthopnea [Paroxysmal Nocturnal Dyspnea] : no paroxysmal nocturnal dyspnea [Shortness Of Breath] : no shortness of breath [Wheezing] : no wheezing [Cough] : no cough [Constipation] : no constipation [Vomiting] : no vomiting [Heartburn] : no heartburn [Melena] : no melena [Easy Bruising] : no easy bruising [FreeTextEntry3] : states they ere yellow last week, watery [FreeTextEntry5] : rapid pulse

## 2020-12-14 NOTE — PLAN
[FreeTextEntry1] : pt advised to call his gastro today. Needs tap for ascites.\par Advised ER if worsening sx

## 2020-12-15 ENCOUNTER — LABORATORY RESULT (OUTPATIENT)
Age: 55
End: 2020-12-15

## 2020-12-23 PROBLEM — Z87.09 HISTORY OF ACUTE BRONCHITIS: Status: RESOLVED | Noted: 2020-02-17 | Resolved: 2020-12-23

## 2021-01-08 LAB
ALBUMIN SERPL ELPH-MCNC: 3.3 G/DL
ALP BLD-CCNC: 117 U/L
ALT SERPL-CCNC: 18 U/L
ANION GAP SERPL CALC-SCNC: 22 MMOL/L
AST SERPL-CCNC: 121 U/L
BASOPHILS # BLD AUTO: 0.03 K/UL
BASOPHILS NFR BLD AUTO: 0.4 %
BILIRUB SERPL-MCNC: 4.9 MG/DL
BUN SERPL-MCNC: 14 MG/DL
CALCIUM SERPL-MCNC: 8.5 MG/DL
CHLORIDE SERPL-SCNC: 84 MMOL/L
CHOLEST SERPL-MCNC: 142 MG/DL
CO2 SERPL-SCNC: 30 MMOL/L
CREAT SERPL-MCNC: 1.3 MG/DL
EOSINOPHIL # BLD AUTO: 0.04 K/UL
EOSINOPHIL NFR BLD AUTO: 0.6 %
FOLATE SERPL-MCNC: 7 NG/ML
GGT SERPL-CCNC: 260 U/L
GLUCOSE SERPL-MCNC: 60 MG/DL
HCT VFR BLD CALC: 36.9 %
HDLC SERPL-MCNC: 49 MG/DL
HGB BLD-MCNC: 12.6 G/DL
IMM GRANULOCYTES NFR BLD AUTO: 0.1 %
LDLC SERPL CALC-MCNC: 70 MG/DL
LYMPHOCYTES # BLD AUTO: 1.31 K/UL
LYMPHOCYTES NFR BLD AUTO: 18.9 %
MAN DIFF?: NORMAL
MCHC RBC-ENTMCNC: 34.1 GM/DL
MCHC RBC-ENTMCNC: 37.2 PG
MCV RBC AUTO: 108.8 FL
MONOCYTES # BLD AUTO: 0.41 K/UL
MONOCYTES NFR BLD AUTO: 5.9 %
NEUTROPHILS # BLD AUTO: 5.13 K/UL
NEUTROPHILS NFR BLD AUTO: 74.1 %
NONHDLC SERPL-MCNC: 93 MG/DL
PLATELET # BLD AUTO: 80 K/UL
POTASSIUM SERPL-SCNC: 3.2 MMOL/L
PROT SERPL-MCNC: 7.2 G/DL
RBC # BLD: 3.39 M/UL
RBC # FLD: 13.1 %
SODIUM SERPL-SCNC: 136 MMOL/L
TRIGL SERPL-MCNC: 117 MG/DL
TSH SERPL-ACNC: 10.4 UIU/ML
VIT B12 SERPL-MCNC: 869 PG/ML
WBC # FLD AUTO: 6.93 K/UL

## 2021-01-11 ENCOUNTER — RESULT REVIEW (OUTPATIENT)
Age: 56
End: 2021-01-11

## 2021-01-18 ENCOUNTER — NON-APPOINTMENT (OUTPATIENT)
Age: 56
End: 2021-01-18

## 2021-01-19 ENCOUNTER — APPOINTMENT (OUTPATIENT)
Dept: FAMILY MEDICINE | Facility: CLINIC | Age: 56
End: 2021-01-19
Payer: COMMERCIAL

## 2021-01-19 DIAGNOSIS — F10.20 ALCOHOL DEPENDENCE, UNCOMPLICATED: ICD-10-CM

## 2021-01-19 DIAGNOSIS — I10 ESSENTIAL (PRIMARY) HYPERTENSION: ICD-10-CM

## 2021-01-19 PROCEDURE — 99214 OFFICE O/P EST MOD 30 MIN: CPT

## 2021-01-19 PROCEDURE — 99072 ADDL SUPL MATRL&STAF TM PHE: CPT

## 2021-01-19 RX ORDER — LACTOBACILLUS ACIDOPHILUS 500MM CELL
CAPSULE ORAL
Refills: 0 | Status: ACTIVE | COMMUNITY
Start: 2021-01-19

## 2021-01-19 RX ORDER — METOPROLOL SUCCINATE 100 MG/1
100 TABLET, EXTENDED RELEASE ORAL DAILY
Refills: 0 | Status: DISCONTINUED | COMMUNITY
Start: 2020-04-03 | End: 2021-01-19

## 2021-01-19 RX ORDER — PREDNISONE 20 MG/1
20 TABLET ORAL
Refills: 0 | Status: ACTIVE | COMMUNITY
Start: 2021-01-19

## 2021-01-19 RX ORDER — HYDROXYZINE HYDROCHLORIDE 50 MG/1
50 TABLET ORAL
Qty: 10 | Refills: 0 | Status: DISCONTINUED | COMMUNITY
Start: 2019-07-08 | End: 2021-01-19

## 2021-01-19 RX ORDER — MV-MIN/FOLIC/K1/LYCOPEN/LUTEIN 300-60 MCG
TABLET ORAL
Refills: 0 | Status: ACTIVE | COMMUNITY
Start: 2021-01-19

## 2021-01-21 NOTE — REVIEW OF SYSTEMS
[Fever] : no fever [Chills] : no chills [Fatigue] : no fatigue [Night Sweats] : no night sweats [Hot Flashes] : no hot flashes [Recent Change In Weight] : ~T no recent weight change [Chest Pain] : no chest pain [Palpitations] : palpitations [Claudication] : no  leg claudication [Lower Ext Edema] : no lower extremity edema [Paroxysmal Nocturnal Dyspnea] : no paroxysmal nocturnal dyspnea [Orthopena] : no orthopnea [Shortness Of Breath] : no shortness of breath [Wheezing] : no wheezing [Cough] : no cough [Dyspnea on Exertion] : dyspnea on exertion [Abdominal Pain] : abdominal pain [Nausea] : nausea [Constipation] : no constipation [Vomiting] : no vomiting [Heartburn] : no heartburn [Melena] : no melena [Dizziness] : dizziness [Anxiety] : anxiety [Easy Bruising] : no easy bruising [Negative] : Genitourinary [FreeTextEntry3] : states they ere yellow last week, watery [FreeTextEntry5] : rapid pulse

## 2021-01-21 NOTE — HISTORY OF PRESENT ILLNESS
[FreeTextEntry1] : patient here to follow up from hospital stay [de-identified] : PT HAS SEVERE ASCITES AND HAD PARACENTESIS\par PT GOING TO REHAB TODAY

## 2021-01-21 NOTE — PHYSICAL EXAM
[Ill-Appearing] : ill-appearing [Normal] : normal rate, regular rhythm, normal S1 and S2 and no murmur heard [No Carotid Bruits] : no carotid bruits [Coordination Grossly Intact] : coordination grossly intact [Alert and Oriented x3] : oriented to person, place, and time [de-identified] : abdomin enlarged and  ascites

## 2021-04-12 ENCOUNTER — TRANSCRIPTION ENCOUNTER (OUTPATIENT)
Age: 56
End: 2021-04-12

## 2021-05-05 ENCOUNTER — APPOINTMENT (OUTPATIENT)
Dept: SURGERY | Facility: CLINIC | Age: 56
End: 2021-05-05

## 2021-05-28 ENCOUNTER — TRANSCRIPTION ENCOUNTER (OUTPATIENT)
Age: 56
End: 2021-05-28

## 2021-06-10 ENCOUNTER — APPOINTMENT (OUTPATIENT)
Age: 56
End: 2021-06-10
Payer: COMMERCIAL

## 2021-06-10 VITALS
DIASTOLIC BLOOD PRESSURE: 78 MMHG | OXYGEN SATURATION: 97 % | HEART RATE: 86 BPM | SYSTOLIC BLOOD PRESSURE: 118 MMHG | RESPIRATION RATE: 15 BRPM | WEIGHT: 191 LBS | HEIGHT: 67 IN | BODY MASS INDEX: 29.98 KG/M2

## 2021-06-10 DIAGNOSIS — Z72.89 OTHER PROBLEMS RELATED TO LIFESTYLE: ICD-10-CM

## 2021-06-10 DIAGNOSIS — K82.8 OTHER SPECIFIED DISEASES OF GALLBLADDER: ICD-10-CM

## 2021-06-10 PROCEDURE — 99204 OFFICE O/P NEW MOD 45 MIN: CPT

## 2021-06-10 PROCEDURE — 99072 ADDL SUPL MATRL&STAF TM PHE: CPT

## 2021-06-10 NOTE — PHYSICAL EXAM
[JVD] : no jugular venous distention  [Normal Thyroid] : the thyroid was normal [Normal Breath Sounds] : Normal breath sounds [Normal Heart Sounds] : normal heart sounds [Abdomen Tenderness] : ~T ~M Abdominal tenderness [Enlarged] : enlarged [Alert] : alert [Oriented to Person] : oriented to person [Oriented to Place] : oriented to place [Oriented to Time] : oriented to time [Calm] : calm [de-identified] : NAD [de-identified] : NC/AT PER +icterus [de-identified] : tender over RUQ bruise. Nl BS. + RIH [de-identified] : moves all 4 extr

## 2021-06-10 NOTE — REVIEW OF SYSTEMS
[Fever] : no fever [Chills] : no chills [Eye Pain] : no eye pain [Earache] : no earache [Chest Pain] : no chest pain [Diarrhea] : diarrhea [Convulsions] : no convulsions [Easy Bleeding] : a tendency for easy bleeding [Easy Bruising] : a tendency for easy bruising

## 2021-06-10 NOTE — HISTORY OF PRESENT ILLNESS
[de-identified] : RUQ pain over the weekend. Occ episodes of similar pain have been attributed to his liver disease/cirrhosis. No fever. Last CMP in chart is from December (T Bili 4.9)\par CT and sono reviewed. Also loose BM (but takes lactulose).

## 2021-06-10 NOTE — CONSULT LETTER
[Dear  ___] : Dear  [unfilled], [Consult Letter:] : I had the pleasure of evaluating your patient, [unfilled]. [Please see my note below.] : Please see my note below. [Consult Closing:] : Thank you very much for allowing me to participate in the care of this patient.  If you have any questions, please do not hesitate to contact me. [Sincerely,] : Sincerely, [FreeTextEntry3] : Wm Elfego CARMICHAEL

## 2021-06-10 NOTE — PLAN
[FreeTextEntry1] : Sludge in GB. No acute surgical intervention required at this point. Monitor sx. High risk due to cirrhosis.

## 2021-06-10 NOTE — ASSESSMENT
[FreeTextEntry1] : History of cirrhosis. GB sludge on sono and CT. RIH - no sx. Fluid in abd seems to be related to cirrhosis and not to GB disease. Nl GB wall thickness. Pain less per pt.

## 2021-07-18 ENCOUNTER — INPATIENT (INPATIENT)
Facility: HOSPITAL | Age: 56
LOS: 37 days | Discharge: NURSING, REHAB OR LONG-TERM CARE | End: 2021-08-25
Attending: STUDENT IN AN ORGANIZED HEALTH CARE EDUCATION/TRAINING PROGRAM | Admitting: STUDENT IN AN ORGANIZED HEALTH CARE EDUCATION/TRAINING PROGRAM
Payer: COMMERCIAL

## 2021-07-18 PROCEDURE — 93010 ELECTROCARDIOGRAM REPORT: CPT

## 2021-07-18 PROCEDURE — 99285 EMERGENCY DEPT VISIT HI MDM: CPT

## 2021-07-18 PROCEDURE — 76705 ECHO EXAM OF ABDOMEN: CPT | Mod: 26

## 2021-07-18 PROCEDURE — 71045 X-RAY EXAM CHEST 1 VIEW: CPT | Mod: 26

## 2021-07-20 ENCOUNTER — APPOINTMENT (OUTPATIENT)
Dept: FAMILY MEDICINE | Facility: CLINIC | Age: 56
End: 2021-07-20

## 2021-07-23 PROCEDURE — 70551 MRI BRAIN STEM W/O DYE: CPT | Mod: 26

## 2021-07-26 ENCOUNTER — NON-APPOINTMENT (OUTPATIENT)
Age: 56
End: 2021-07-26

## 2021-07-26 PROCEDURE — 71045 X-RAY EXAM CHEST 1 VIEW: CPT | Mod: 26

## 2021-07-27 PROCEDURE — 71250 CT THORAX DX C-: CPT | Mod: 26

## 2021-07-27 PROCEDURE — 74176 CT ABD & PELVIS W/O CONTRAST: CPT | Mod: 26

## 2021-07-30 PROCEDURE — 71045 X-RAY EXAM CHEST 1 VIEW: CPT | Mod: 26

## 2021-07-31 PROCEDURE — 71046 X-RAY EXAM CHEST 2 VIEWS: CPT | Mod: 26

## 2021-08-01 PROCEDURE — 71046 X-RAY EXAM CHEST 2 VIEWS: CPT | Mod: 26

## 2021-08-02 PROCEDURE — 71045 X-RAY EXAM CHEST 1 VIEW: CPT | Mod: 26

## 2021-08-03 PROCEDURE — 71250 CT THORAX DX C-: CPT | Mod: 26

## 2021-08-04 PROCEDURE — 71045 X-RAY EXAM CHEST 1 VIEW: CPT | Mod: 26

## 2021-08-04 PROCEDURE — 93306 TTE W/DOPPLER COMPLETE: CPT | Mod: 26

## 2021-08-06 PROCEDURE — 70551 MRI BRAIN STEM W/O DYE: CPT | Mod: 26

## 2021-08-06 PROCEDURE — 49083 ABD PARACENTESIS W/IMAGING: CPT

## 2021-08-07 PROCEDURE — 71045 X-RAY EXAM CHEST 1 VIEW: CPT | Mod: 26

## 2021-08-08 PROCEDURE — 71250 CT THORAX DX C-: CPT | Mod: 26

## 2021-08-08 PROCEDURE — 71045 X-RAY EXAM CHEST 1 VIEW: CPT | Mod: 26

## 2021-08-09 PROCEDURE — 71045 X-RAY EXAM CHEST 1 VIEW: CPT | Mod: 26

## 2021-08-09 PROCEDURE — 71275 CT ANGIOGRAPHY CHEST: CPT | Mod: 26

## 2021-08-10 PROCEDURE — 71045 X-RAY EXAM CHEST 1 VIEW: CPT | Mod: 26,77

## 2021-08-10 PROCEDURE — 71045 X-RAY EXAM CHEST 1 VIEW: CPT | Mod: 26

## 2021-08-11 PROCEDURE — 70450 CT HEAD/BRAIN W/O DYE: CPT | Mod: 26

## 2021-08-11 PROCEDURE — 71045 X-RAY EXAM CHEST 1 VIEW: CPT | Mod: 26

## 2021-08-12 PROCEDURE — 71045 X-RAY EXAM CHEST 1 VIEW: CPT | Mod: 26

## 2021-08-13 PROCEDURE — 76705 ECHO EXAM OF ABDOMEN: CPT | Mod: 26

## 2021-08-15 PROCEDURE — 71045 X-RAY EXAM CHEST 1 VIEW: CPT | Mod: 26

## 2021-08-17 PROCEDURE — 74230 X-RAY XM SWLNG FUNCJ C+: CPT | Mod: 26

## 2021-08-17 PROCEDURE — 49083 ABD PARACENTESIS W/IMAGING: CPT

## 2021-08-19 PROCEDURE — 93970 EXTREMITY STUDY: CPT | Mod: 26

## 2021-08-23 PROCEDURE — 71045 X-RAY EXAM CHEST 1 VIEW: CPT | Mod: 26

## 2021-09-07 ENCOUNTER — NON-APPOINTMENT (OUTPATIENT)
Age: 56
End: 2021-09-07

## 2021-09-12 ENCOUNTER — INPATIENT (INPATIENT)
Facility: HOSPITAL | Age: 56
LOS: 14 days | Discharge: HOME | DRG: 314 | End: 2021-09-27
Attending: FAMILY MEDICINE | Admitting: INTERNAL MEDICINE
Payer: COMMERCIAL

## 2021-09-12 VITALS
WEIGHT: 171.96 LBS | HEART RATE: 48 BPM | RESPIRATION RATE: 16 BRPM | SYSTOLIC BLOOD PRESSURE: 56 MMHG | DIASTOLIC BLOOD PRESSURE: 67 MMHG | TEMPERATURE: 98 F | OXYGEN SATURATION: 100 %

## 2021-09-12 DIAGNOSIS — R00.1 BRADYCARDIA, UNSPECIFIED: ICD-10-CM

## 2021-09-12 LAB
ALBUMIN SERPL ELPH-MCNC: 2.9 G/DL — LOW (ref 3.3–5)
ALP SERPL-CCNC: 60 U/L — SIGNIFICANT CHANGE UP (ref 40–120)
ALT FLD-CCNC: 10 U/L — LOW (ref 12–78)
ANION GAP SERPL CALC-SCNC: 7 MMOL/L — SIGNIFICANT CHANGE UP (ref 5–17)
APTT BLD: 35.6 SEC — HIGH (ref 27.5–35.5)
AST SERPL-CCNC: 36 U/L — SIGNIFICANT CHANGE UP (ref 15–37)
BASOPHILS # BLD AUTO: 0.05 K/UL — SIGNIFICANT CHANGE UP (ref 0–0.2)
BASOPHILS NFR BLD AUTO: 0.8 % — SIGNIFICANT CHANGE UP (ref 0–2)
BILIRUB SERPL-MCNC: 1.5 MG/DL — HIGH (ref 0.2–1.2)
BUN SERPL-MCNC: 32 MG/DL — HIGH (ref 7–23)
CALCIUM SERPL-MCNC: 8.7 MG/DL — SIGNIFICANT CHANGE UP (ref 8.5–10.1)
CHLORIDE SERPL-SCNC: 95 MMOL/L — LOW (ref 96–108)
CO2 SERPL-SCNC: 28 MMOL/L — SIGNIFICANT CHANGE UP (ref 22–31)
CREAT SERPL-MCNC: 1.45 MG/DL — HIGH (ref 0.5–1.3)
DIGOXIN SERPL-MCNC: 1.07 NG/ML — SIGNIFICANT CHANGE UP (ref 0.8–2)
EOSINOPHIL # BLD AUTO: 0.11 K/UL — SIGNIFICANT CHANGE UP (ref 0–0.5)
EOSINOPHIL NFR BLD AUTO: 1.7 % — SIGNIFICANT CHANGE UP (ref 0–6)
GLUCOSE SERPL-MCNC: 115 MG/DL — HIGH (ref 70–99)
HCT VFR BLD CALC: 33.9 % — LOW (ref 39–50)
HGB BLD-MCNC: 11.2 G/DL — LOW (ref 13–17)
IMM GRANULOCYTES NFR BLD AUTO: 0.3 % — SIGNIFICANT CHANGE UP (ref 0–1.5)
INR BLD: 1.56 RATIO — HIGH (ref 0.88–1.16)
LACTATE SERPL-SCNC: 2 MMOL/L — SIGNIFICANT CHANGE UP (ref 0.7–2)
LYMPHOCYTES # BLD AUTO: 1.53 K/UL — SIGNIFICANT CHANGE UP (ref 1–3.3)
LYMPHOCYTES # BLD AUTO: 24.2 % — SIGNIFICANT CHANGE UP (ref 13–44)
MAGNESIUM SERPL-MCNC: 1.7 MG/DL — SIGNIFICANT CHANGE UP (ref 1.6–2.6)
MCHC RBC-ENTMCNC: 30.2 PG — SIGNIFICANT CHANGE UP (ref 27–34)
MCHC RBC-ENTMCNC: 33 GM/DL — SIGNIFICANT CHANGE UP (ref 32–36)
MCV RBC AUTO: 91.4 FL — SIGNIFICANT CHANGE UP (ref 80–100)
MONOCYTES # BLD AUTO: 0.61 K/UL — SIGNIFICANT CHANGE UP (ref 0–0.9)
MONOCYTES NFR BLD AUTO: 9.6 % — SIGNIFICANT CHANGE UP (ref 2–14)
NEUTROPHILS # BLD AUTO: 4.01 K/UL — SIGNIFICANT CHANGE UP (ref 1.8–7.4)
NEUTROPHILS NFR BLD AUTO: 63.4 % — SIGNIFICANT CHANGE UP (ref 43–77)
NT-PROBNP SERPL-SCNC: 789 PG/ML — HIGH (ref 0–125)
PLATELET # BLD AUTO: 182 K/UL — SIGNIFICANT CHANGE UP (ref 150–400)
POTASSIUM SERPL-MCNC: 3.7 MMOL/L — SIGNIFICANT CHANGE UP (ref 3.5–5.3)
POTASSIUM SERPL-SCNC: 3.7 MMOL/L — SIGNIFICANT CHANGE UP (ref 3.5–5.3)
PROT SERPL-MCNC: 6.9 GM/DL — SIGNIFICANT CHANGE UP (ref 6–8.3)
PROTHROM AB SERPL-ACNC: 17.9 SEC — HIGH (ref 10.6–13.6)
RBC # BLD: 3.71 M/UL — LOW (ref 4.2–5.8)
RBC # FLD: 14.2 % — SIGNIFICANT CHANGE UP (ref 10.3–14.5)
SARS-COV-2 RNA SPEC QL NAA+PROBE: SIGNIFICANT CHANGE UP
SODIUM SERPL-SCNC: 130 MMOL/L — LOW (ref 135–145)
TROPONIN I SERPL-MCNC: <0.015 NG/ML — SIGNIFICANT CHANGE UP (ref 0.01–0.04)
WBC # BLD: 6.33 K/UL — SIGNIFICANT CHANGE UP (ref 3.8–10.5)
WBC # FLD AUTO: 6.33 K/UL — SIGNIFICANT CHANGE UP (ref 3.8–10.5)

## 2021-09-12 PROCEDURE — P9047: CPT | Mod: JG

## 2021-09-12 PROCEDURE — 71045 X-RAY EXAM CHEST 1 VIEW: CPT | Mod: 26

## 2021-09-12 PROCEDURE — 81003 URINALYSIS AUTO W/O SCOPE: CPT

## 2021-09-12 PROCEDURE — 85027 COMPLETE CBC AUTOMATED: CPT

## 2021-09-12 PROCEDURE — 82533 TOTAL CORTISOL: CPT

## 2021-09-12 PROCEDURE — 97116 GAIT TRAINING THERAPY: CPT | Mod: GP

## 2021-09-12 PROCEDURE — 85025 COMPLETE CBC W/AUTO DIFF WBC: CPT

## 2021-09-12 PROCEDURE — 97530 THERAPEUTIC ACTIVITIES: CPT | Mod: GP

## 2021-09-12 PROCEDURE — 93971 EXTREMITY STUDY: CPT | Mod: LT

## 2021-09-12 PROCEDURE — 93306 TTE W/DOPPLER COMPLETE: CPT | Mod: 26

## 2021-09-12 PROCEDURE — 85730 THROMBOPLASTIN TIME PARTIAL: CPT

## 2021-09-12 PROCEDURE — 80053 COMPREHEN METABOLIC PANEL: CPT

## 2021-09-12 PROCEDURE — 84145 PROCALCITONIN (PCT): CPT

## 2021-09-12 PROCEDURE — 86769 SARS-COV-2 COVID-19 ANTIBODY: CPT

## 2021-09-12 PROCEDURE — 97162 PT EVAL MOD COMPLEX 30 MIN: CPT | Mod: GP

## 2021-09-12 PROCEDURE — 84443 ASSAY THYROID STIM HORMONE: CPT

## 2021-09-12 PROCEDURE — 85610 PROTHROMBIN TIME: CPT

## 2021-09-12 PROCEDURE — 83735 ASSAY OF MAGNESIUM: CPT

## 2021-09-12 PROCEDURE — 36415 COLL VENOUS BLD VENIPUNCTURE: CPT

## 2021-09-12 PROCEDURE — 93010 ELECTROCARDIOGRAM REPORT: CPT | Mod: 76

## 2021-09-12 PROCEDURE — 93005 ELECTROCARDIOGRAM TRACING: CPT

## 2021-09-12 PROCEDURE — 94640 AIRWAY INHALATION TREATMENT: CPT

## 2021-09-12 PROCEDURE — 82140 ASSAY OF AMMONIA: CPT

## 2021-09-12 PROCEDURE — 87040 BLOOD CULTURE FOR BACTERIA: CPT

## 2021-09-12 PROCEDURE — 84484 ASSAY OF TROPONIN QUANT: CPT

## 2021-09-12 PROCEDURE — U0003: CPT

## 2021-09-12 PROCEDURE — 84100 ASSAY OF PHOSPHORUS: CPT

## 2021-09-12 PROCEDURE — 99291 CRITICAL CARE FIRST HOUR: CPT

## 2021-09-12 PROCEDURE — 80048 BASIC METABOLIC PNL TOTAL CA: CPT

## 2021-09-12 PROCEDURE — 83605 ASSAY OF LACTIC ACID: CPT

## 2021-09-12 RX ORDER — ATROPINE SULFATE 0.1 MG/ML
0.5 SYRINGE (ML) INJECTION ONCE
Refills: 0 | Status: DISCONTINUED | OUTPATIENT
Start: 2021-09-12 | End: 2021-09-12

## 2021-09-12 RX ORDER — CEFTRIAXONE 500 MG/1
2000 INJECTION, POWDER, FOR SOLUTION INTRAMUSCULAR; INTRAVENOUS ONCE
Refills: 0 | Status: DISCONTINUED | OUTPATIENT
Start: 2021-09-12 | End: 2021-09-12

## 2021-09-12 RX ORDER — CEFTRIAXONE 500 MG/1
2000 INJECTION, POWDER, FOR SOLUTION INTRAMUSCULAR; INTRAVENOUS ONCE
Refills: 0 | Status: COMPLETED | OUTPATIENT
Start: 2021-09-12 | End: 2021-09-12

## 2021-09-12 RX ORDER — SODIUM CHLORIDE 9 MG/ML
1000 INJECTION, SOLUTION INTRAVENOUS ONCE
Refills: 0 | Status: COMPLETED | OUTPATIENT
Start: 2021-09-12 | End: 2021-09-12

## 2021-09-12 RX ORDER — ATROPINE SULFATE 0.1 MG/ML
1 SYRINGE (ML) INJECTION ONCE
Refills: 0 | Status: COMPLETED | OUTPATIENT
Start: 2021-09-12 | End: 2021-09-12

## 2021-09-12 RX ORDER — NOREPINEPHRINE BITARTRATE/D5W 8 MG/250ML
0.05 PLASTIC BAG, INJECTION (ML) INTRAVENOUS
Qty: 8 | Refills: 0 | Status: DISCONTINUED | OUTPATIENT
Start: 2021-09-12 | End: 2021-09-16

## 2021-09-12 RX ORDER — SODIUM CHLORIDE 9 MG/ML
2300 INJECTION INTRAMUSCULAR; INTRAVENOUS; SUBCUTANEOUS ONCE
Refills: 0 | Status: COMPLETED | OUTPATIENT
Start: 2021-09-12 | End: 2021-09-12

## 2021-09-12 RX ADMIN — CEFTRIAXONE 2000 MILLIGRAM(S): 500 INJECTION, POWDER, FOR SOLUTION INTRAMUSCULAR; INTRAVENOUS at 16:46

## 2021-09-12 RX ADMIN — SODIUM CHLORIDE 2300 MILLILITER(S): 9 INJECTION INTRAMUSCULAR; INTRAVENOUS; SUBCUTANEOUS at 16:47

## 2021-09-12 RX ADMIN — Medication 7.31 MICROGRAM(S)/KG/MIN: at 16:47

## 2021-09-12 RX ADMIN — Medication 1 MILLIGRAM(S): at 16:48

## 2021-09-12 RX ADMIN — SODIUM CHLORIDE 1000 MILLILITER(S): 9 INJECTION, SOLUTION INTRAVENOUS at 23:56

## 2021-09-12 NOTE — PROVIDER CONTACT NOTE (EICU) - BACKGROUND
55 year old male with h/o ETOH Cirrhosis, Chronic afib on pradaxa, dig, BB, Gerd, who was sent in from rehab for near syncope found to have hypotension and bradycardia. In ED also noted to be hypotensive and started on pressors.   HR and BP improved

## 2021-09-12 NOTE — ED ADULT TRIAGE NOTE - CHIEF COMPLAINT QUOTE
pt presents from [facility] with hypotension and bradycardia. denies chest pain denies SOB. pt alert and oriented x4 with no acute complaints

## 2021-09-12 NOTE — H&P ADULT - HISTORY OF PRESENT ILLNESS
55M hx HTN BPH chronic a fib on pradaxa.  GERD alcoholic cirrhosis     First time in [place].  States he was at [facility] [place] for a few weeks after he went into alcohol withdrawal.  He does not have any recollection of his time at [facility].      He was sent to [facility] for physical rehab.      In [facility] until this afternoon when her felt dizzy.  He states than he has not been eating or drinking much.  He is on a bunch of diuretics,     Admit with MELISSA hypotension and junctional nena cardia.  Cultured given fluids abx and started on nor-epi  noted with mild hyponatremia        55M hx HTN BPH Gerd chronic a fib ??? on pradaxa.  GERD alcoholic cirrhosis     First time in [place].  His hx is not really reliable and the SNF records are sparse.  States he was at [facility] in July for a few weeks after he went into alcohol withdrawal.  He does not have any recollection of his time at [facility].      He was sent to [facility] for physical rehab.      In [facility] until this afternoon when her felt dizzy.  He states than he has not been eating or drinking much.    Admit with MELISSA hypotension and junctional nena cardia.  Cultured given fluids abx and started on nor-epi  noted with mild hyponatremia  He is on a bunch of diuretics nadolol and digoxin.  ,

## 2021-09-12 NOTE — ED ADULT NURSE NOTE - OBJECTIVE STATEMENT
pt brought to ED for bradycardia and hypotension. pt awake, sitting up, speaking full sentences, following commands. denies any pain or complaints. MD at bedside, verbally ordered Norepi and other medication doses in trauma. hx of ETOH abuse and rehab

## 2021-09-12 NOTE — ED PROVIDER NOTE - PROGRESS NOTE DETAILS
[facility] for Dr. Garnett: POCUS reveals bradycardia, good EF and no pericardial effusion.   Will consult EP. Will start on IB pressers to improve pressures. [facility] for Dr. Garnett: POCUS reveals bradycardia, good EF and no pericardial effusion.   Will consult EP. Will start on IB pressers to improve pressures. Pending call back from Dr. Mckinley. HR improved to 60's on monitor, Bp 112 systolic.  Pt currently asymptomatic.  Pending call back from EP, labs to r/o electrolyte abnormality, digoxin toxicity.  I don't think pt is septic, however given profound hypotension, I am covering for sepsis.  Will discuss with cardiology to r/o HF as possible cause as well. Mehrdad Garnett D.O. [place] [facility] for Dr. Garnett: Spoke with Dr. Palla. Will get bedside echo, which he will read. Still pending call back from Dr. Mckinley. [place] for Dr. Garnett: Spoke with Dr. Mckinley. Will send EKG. Will send electrolyte and dig level once back for the recommendation. BP stable. Heart rate continues to improve. [facility] for Dr. Garnett: Spoke with Dr. Mckinley.  Pt electrolyte and dig levels are normal.  Will be holding Digoxin. Will be holding Nadolol. Dr. Mckinley recommends against glucagon. Pt stable on norepi drip and fluids. Still pending STAT echo. ICU consulted, will see pt in ed for consult.

## 2021-09-12 NOTE — ED PROVIDER NOTE - OBJECTIVE STATEMENT
55 year old male with PMHx of chronic Afib, GERD, MELISSA, anemia, Hyponatremia, ascitis, alcohol cirrhosis, atelectasis, hypotension presents to the ED BIBEMS from [facility] c/o hypotension and bradycardia. Pt had a near syncopal episode during transfer per EMS. Denies chest pain, SOB. 55 year old male with PMHx of chronic Afib, GERD, MELISSA, anemia, Hyponatremia, ascitis, alcohol cirrhosis, atelectasis, hypotension presents to the ED BIBEMS from [facility] c/o hypotension and bradycardia. Pt had a near syncopal episode during transfer per EMS. Denies chest pain, SOB.  Cardio: Dr. Weems

## 2021-09-12 NOTE — PROVIDER CONTACT NOTE (EICU) - RECOMMENDATIONS
Admit to icu  pressors to maintain bp/hr  taper as tolerated  cardio eval  IVF  Empiric antibiotics pending cultures to return to r/o infection  check Dig level  hold BB, DIg  Cardio eval, based on sodium repeat consider renal eval  Check Cortisol level

## 2021-09-12 NOTE — ED PROVIDER NOTE - NSICDXPASTMEDICALHX_GEN_ALL_CORE_FT
PAST MEDICAL HISTORY:  Afib     MELISSA (acute kidney injury)     Alcoholic cirrhosis     Anemia     Atelectasis     GERD (gastroesophageal reflux disease)     Hyponatremia     Hypotension

## 2021-09-12 NOTE — ED PROVIDER NOTE - DATE/TIME 5
"Outpatient Physical Therapy Discharge Note     Patient: Bebe Alfonso  : 1944    Beginning/End Dates of Reporting Period:  2018 to 10/19/2018    Referring Provider: SELWYN Gibson CNP    Therapy Diagnosis: Neck pain     Client Self Report: Pt cont to report neck feels \"really good\" and has no pain in the last 2 weeks. Pt felt some soreness while redoing kitchen floor a couple days ago but was able to perform HEP to reduce sx. Pt cont to report smoking has improved (less smoking) since neck pain has decreased. Lastly, pt cont to note increased activity tolerance from decreased neck pain since starting PT (increased standing time while cooking).    Objective Measurements:  Objective Measure: cervical ROM  Details: flexion: chin to chest, extension: 20*, SB R:45*, SB L:44*,  Rot R: 60, Rot L: 48*  (no pain with any motion)  Objective Measure: UE strength  Details: Pt able to lift and carry 10# with good body mechanics and no increase in cervical pain     Outcome Measures (most recent score):  NDI: 0% disability (3 at initial eval)      Goals:  Goal Identifier 1   Goal Description Pt will be able to lift and carry 10# without increase in sx to decrease pain with ADLs.   Target Date 10/23/18   Date Met  10/19/18   Progress:     Goal Identifier 2   Goal Description Pt will be able sit throughout therapy session with good posture and without cues in order to decrease pain.   Target Date 18   Date Met  10/12/18   Progress:     Goal Identifier 3   Goal Description Pt will be independent with HEP in order to self manage symptoms.   Target Date 18   Date Met  10/12/18   Progress:       Progress Toward Goals:   Progress this reporting period: Pt met remaining goal today and has now met all 3/3 goals. Pt's cervical ROM and posture have significantly improved since starting PT and pain has also decreased. Pt is appropriate for D/C at this time as she is independent with HEP and has returned to " PLOF.    Plan:  Discharge from therapy.    Discharge:    Reason for Discharge: Patient has met all goals.  Patient chooses to discontinue therapy.    Equipment Issued: therabands    Discharge Plan: Patient to continue home program.    Please contact me with any questions or concerns.    Thank you for your referral,     Rina Jauregui, PT, DPT  Physical Therapist  [facility] - [address] [address] [phone]     12-Sep-2021 17:54

## 2021-09-12 NOTE — ED PROVIDER NOTE - CRITICAL CARE ATTENDING CONTRIBUTION TO CARE
Due to a high probability of clinically significant, life threatening deterioration, the patient required my highest level of preparedness to intervene emergently and I personally spent this critical care time directly and personally managing the patient. This critical care time included obtaining a history; examining the patient; pulse oximetry; ordering and review of studies; arranging urgent treatment with development of a management plan; evaluation of patient's response to treatment; frequent reassessment; and, discussions with other providers. This critical care time was performed to assess and manage the high probability of imminent, life-threatening deterioration that could result in multi-organ failure. It was exclusive of separately billable procedures and treating other patients and teaching time

## 2021-09-12 NOTE — ED PROVIDER NOTE - ENMT, MLM
Airway patent, Nasal mucosa clear. Mouth with dry mucus membranes.. Throat has no vesicles, no oropharyngeal exudates and uvula is midline.

## 2021-09-12 NOTE — ED ADULT NURSE NOTE - CHIEF COMPLAINT QUOTE
pt presents from [facility] with hypotension and bradycardia. denies chest pain denies SOB. pt alert and oriented x4 with no acute complaints

## 2021-09-12 NOTE — H&P ADULT - ASSESSMENT
55M hx HTN BPH chronic a fib on pradaxa.  GERD alcoholic cirrhosis.    Presents with near-syncope bradycardia and hypotension.    He has  an echo with ho normal LVEF 70% IVC normal BONITA.  He has no sepsis criteria normal lactate no fever of left shift.       IVF  Check cortisol TSH 55M hx HTN BPH GERD chronic a fib on ?? on pradaxa.  He says he is, but (SNF records don't reflect this)  GERD alcoholic cirrhosis.    Presents from [facility]  with near-syncope bradycardia and hypotension.  Slow afib interspersed with junctional bradycardia.  He is on digoxin and nadolol chronically.  He has MELISSA and looks dehydrated       He has  an echo with ho normal LVEF 70% IVC normal BONITA.  He has no sepsis criteria normal lactate no fever of left shift.     Plan:    IVF  Suport with nor-epi  Thinking about digibind.  Will see.  One dose of albumin and midodrine    Check cortisol TSH ammonia  He received a dose of CTX before cultures were sent.  I don't think he has any infection but will rx till cultures are back send U/a.      Hold digoxin/naldolol and diuretics       Need to clarify why he is not prescribed AC for genaro chronic a fib.      CCT 45 min 55M hx HTN BPH GERD chronic a fib on ?? on pradaxa.  He says he is, but (SNF records don't reflect this)  GERD alcoholic cirrhosis.    Presents from [facility]  with near-syncope bradycardia and hypotension.  Slow afib interspersed with junctional bradycardia.  He is on digoxin and nadolol chronically.  He has MELISSA and looks dehydrated       He has  an echo with ho normal LVEF 70% IVC normal BONITA.  He has no sepsis criteria normal lactate no fever of left shift.     Plan:    IVF  Suport with nor-epi  Thinking about digibind.  Will see.  One dose of albumin and defer midodrine due to bradycardia    Check cortisol TSH ammonia  He received a dose of CTX before cultures were sent.  I don't think he has any infection but will rx till cultures are back send U/a.      Hold digoxin/naldolol and diuretics       Need to clarify why he is not prescribed AC for genaro chronic a fib.      CCT 45 min

## 2021-09-12 NOTE — ED ADULT TRIAGE NOTE - TEMPERATURE IN FAHRENHEIT (DEGREES F)
Retinal tear and detachment warning symptoms reviewed and patient instructed to call immediately if increasing floaters, flashes, or decreasing peripheral vision. 97.5

## 2021-09-12 NOTE — ED PROVIDER NOTE - CLINICAL SUMMARY MEDICAL DECISION MAKING FREE TEXT BOX
Pt improved with IVF, norepinephrine, which he's still on, attempted to titrate down but becomes bradycardic again.  Echo was WNL, no signs of acute HF as source.  Question MELISSA with beta blocker toxicity.  He was covered with antibiotics on arrival, however afebrile, normal WBC, and no infectiou source, makes this unlikely.  Admit to ICU.  Appreciate cardiology consults by Dr. Palla and Dr. Hutchison. Admit to ICU, appreciate consult.

## 2021-09-13 LAB
AMMONIA BLD-MCNC: 73 UMOL/L — HIGH (ref 11–32)
ANION GAP SERPL CALC-SCNC: 8 MMOL/L — SIGNIFICANT CHANGE UP (ref 5–17)
APPEARANCE UR: CLEAR — SIGNIFICANT CHANGE UP
APTT BLD: 33.3 SEC — SIGNIFICANT CHANGE UP (ref 27.5–35.5)
BASOPHILS # BLD AUTO: 0.07 K/UL — SIGNIFICANT CHANGE UP (ref 0–0.2)
BASOPHILS NFR BLD AUTO: 0.7 % — SIGNIFICANT CHANGE UP (ref 0–2)
BILIRUB UR-MCNC: NEGATIVE — SIGNIFICANT CHANGE UP
BUN SERPL-MCNC: 26 MG/DL — HIGH (ref 7–23)
CALCIUM SERPL-MCNC: 9 MG/DL — SIGNIFICANT CHANGE UP (ref 8.5–10.1)
CHLORIDE SERPL-SCNC: 103 MMOL/L — SIGNIFICANT CHANGE UP (ref 96–108)
CO2 SERPL-SCNC: 24 MMOL/L — SIGNIFICANT CHANGE UP (ref 22–31)
COLOR SPEC: YELLOW — SIGNIFICANT CHANGE UP
CORTIS AM PEAK SERPL-MCNC: 11.8 UG/DL — SIGNIFICANT CHANGE UP (ref 6–18.4)
COVID-19 SPIKE DOMAIN AB INTERP: POSITIVE
COVID-19 SPIKE DOMAIN ANTIBODY RESULT: 154 U/ML — HIGH
CREAT SERPL-MCNC: 1.07 MG/DL — SIGNIFICANT CHANGE UP (ref 0.5–1.3)
DIFF PNL FLD: NEGATIVE — SIGNIFICANT CHANGE UP
EOSINOPHIL # BLD AUTO: 0.26 K/UL — SIGNIFICANT CHANGE UP (ref 0–0.5)
EOSINOPHIL NFR BLD AUTO: 2.7 % — SIGNIFICANT CHANGE UP (ref 0–6)
GLUCOSE SERPL-MCNC: 121 MG/DL — HIGH (ref 70–99)
GLUCOSE UR QL: NEGATIVE MG/DL — SIGNIFICANT CHANGE UP
HCT VFR BLD CALC: 33.5 % — LOW (ref 39–50)
HGB BLD-MCNC: 11.4 G/DL — LOW (ref 13–17)
IMM GRANULOCYTES NFR BLD AUTO: 0.3 % — SIGNIFICANT CHANGE UP (ref 0–1.5)
KETONES UR-MCNC: NEGATIVE — SIGNIFICANT CHANGE UP
LEUKOCYTE ESTERASE UR-ACNC: NEGATIVE — SIGNIFICANT CHANGE UP
LYMPHOCYTES # BLD AUTO: 2.34 K/UL — SIGNIFICANT CHANGE UP (ref 1–3.3)
LYMPHOCYTES # BLD AUTO: 24 % — SIGNIFICANT CHANGE UP (ref 13–44)
MAGNESIUM SERPL-MCNC: 1.7 MG/DL — SIGNIFICANT CHANGE UP (ref 1.6–2.6)
MCHC RBC-ENTMCNC: 30.3 PG — SIGNIFICANT CHANGE UP (ref 27–34)
MCHC RBC-ENTMCNC: 34 GM/DL — SIGNIFICANT CHANGE UP (ref 32–36)
MCV RBC AUTO: 89.1 FL — SIGNIFICANT CHANGE UP (ref 80–100)
MONOCYTES # BLD AUTO: 0.85 K/UL — SIGNIFICANT CHANGE UP (ref 0–0.9)
MONOCYTES NFR BLD AUTO: 8.7 % — SIGNIFICANT CHANGE UP (ref 2–14)
NEUTROPHILS # BLD AUTO: 6.19 K/UL — SIGNIFICANT CHANGE UP (ref 1.8–7.4)
NEUTROPHILS NFR BLD AUTO: 63.6 % — SIGNIFICANT CHANGE UP (ref 43–77)
NITRITE UR-MCNC: NEGATIVE — SIGNIFICANT CHANGE UP
PH UR: 7 — SIGNIFICANT CHANGE UP (ref 5–8)
PHOSPHATE SERPL-MCNC: 2.7 MG/DL — SIGNIFICANT CHANGE UP (ref 2.5–4.5)
PLATELET # BLD AUTO: 190 K/UL — SIGNIFICANT CHANGE UP (ref 150–400)
POTASSIUM SERPL-MCNC: 3.2 MMOL/L — LOW (ref 3.5–5.3)
POTASSIUM SERPL-SCNC: 3.2 MMOL/L — LOW (ref 3.5–5.3)
PROT UR-MCNC: NEGATIVE MG/DL — SIGNIFICANT CHANGE UP
RBC # BLD: 3.76 M/UL — LOW (ref 4.2–5.8)
RBC # FLD: 14.1 % — SIGNIFICANT CHANGE UP (ref 10.3–14.5)
SARS-COV-2 IGG+IGM SERPL QL IA: 154 U/ML — HIGH
SARS-COV-2 IGG+IGM SERPL QL IA: POSITIVE
SODIUM SERPL-SCNC: 135 MMOL/L — SIGNIFICANT CHANGE UP (ref 135–145)
SP GR SPEC: 1 — LOW (ref 1.01–1.02)
TSH SERPL-MCNC: 2.86 UU/ML — SIGNIFICANT CHANGE UP (ref 0.34–4.82)
UROBILINOGEN FLD QL: NEGATIVE MG/DL — SIGNIFICANT CHANGE UP
WBC # BLD: 9.74 K/UL — SIGNIFICANT CHANGE UP (ref 3.8–10.5)
WBC # FLD AUTO: 9.74 K/UL — SIGNIFICANT CHANGE UP (ref 3.8–10.5)

## 2021-09-13 PROCEDURE — 99291 CRITICAL CARE FIRST HOUR: CPT

## 2021-09-13 RX ORDER — HEPARIN SODIUM 5000 [USP'U]/ML
3000 INJECTION INTRAVENOUS; SUBCUTANEOUS EVERY 6 HOURS
Refills: 0 | Status: DISCONTINUED | OUTPATIENT
Start: 2021-09-13 | End: 2021-09-13

## 2021-09-13 RX ORDER — HEPARIN SODIUM 5000 [USP'U]/ML
6000 INJECTION INTRAVENOUS; SUBCUTANEOUS ONCE
Refills: 0 | Status: COMPLETED | OUTPATIENT
Start: 2021-09-13 | End: 2021-09-13

## 2021-09-13 RX ORDER — CEFTRIAXONE 500 MG/1
1000 INJECTION, POWDER, FOR SOLUTION INTRAMUSCULAR; INTRAVENOUS EVERY 24 HOURS
Refills: 0 | Status: DISCONTINUED | OUTPATIENT
Start: 2021-09-13 | End: 2021-09-16

## 2021-09-13 RX ORDER — LACTULOSE 10 G/15ML
10 SOLUTION ORAL EVERY 8 HOURS
Refills: 0 | Status: DISCONTINUED | OUTPATIENT
Start: 2021-09-13 | End: 2021-09-20

## 2021-09-13 RX ORDER — HYDROCORTISONE 20 MG
100 TABLET ORAL ONCE
Refills: 0 | Status: COMPLETED | OUTPATIENT
Start: 2021-09-13 | End: 2021-09-13

## 2021-09-13 RX ORDER — OVINE DIGOXIN IMMUNE FAB 40 MG/1
40 INJECTION, POWDER, FOR SOLUTION INTRAVENOUS ONCE
Refills: 0 | Status: COMPLETED | OUTPATIENT
Start: 2021-09-13 | End: 2021-09-13

## 2021-09-13 RX ORDER — MIDODRINE HYDROCHLORIDE 2.5 MG/1
10 TABLET ORAL EVERY 8 HOURS
Refills: 0 | Status: DISCONTINUED | OUTPATIENT
Start: 2021-09-13 | End: 2021-09-13

## 2021-09-13 RX ORDER — TAMSULOSIN HYDROCHLORIDE 0.4 MG/1
0.4 CAPSULE ORAL AT BEDTIME
Refills: 0 | Status: DISCONTINUED | OUTPATIENT
Start: 2021-09-13 | End: 2021-09-27

## 2021-09-13 RX ORDER — CHLORHEXIDINE GLUCONATE 213 G/1000ML
1 SOLUTION TOPICAL
Refills: 0 | Status: DISCONTINUED | OUTPATIENT
Start: 2021-09-13 | End: 2021-09-16

## 2021-09-13 RX ORDER — HEPARIN SODIUM 5000 [USP'U]/ML
3000 INJECTION INTRAVENOUS; SUBCUTANEOUS EVERY 6 HOURS
Refills: 0 | Status: DISCONTINUED | OUTPATIENT
Start: 2021-09-13 | End: 2021-09-19

## 2021-09-13 RX ORDER — POTASSIUM CHLORIDE 20 MEQ
40 PACKET (EA) ORAL ONCE
Refills: 0 | Status: COMPLETED | OUTPATIENT
Start: 2021-09-13 | End: 2021-09-13

## 2021-09-13 RX ORDER — HEPARIN SODIUM 5000 [USP'U]/ML
INJECTION INTRAVENOUS; SUBCUTANEOUS
Qty: 25000 | Refills: 0 | Status: DISCONTINUED | OUTPATIENT
Start: 2021-09-13 | End: 2021-09-19

## 2021-09-13 RX ORDER — HEPARIN SODIUM 5000 [USP'U]/ML
6000 INJECTION INTRAVENOUS; SUBCUTANEOUS EVERY 6 HOURS
Refills: 0 | Status: DISCONTINUED | OUTPATIENT
Start: 2021-09-13 | End: 2021-09-19

## 2021-09-13 RX ORDER — SODIUM CHLORIDE 9 MG/ML
1000 INJECTION, SOLUTION INTRAVENOUS
Refills: 0 | Status: DISCONTINUED | OUTPATIENT
Start: 2021-09-13 | End: 2021-09-14

## 2021-09-13 RX ORDER — CEFTRIAXONE 500 MG/1
1000 INJECTION, POWDER, FOR SOLUTION INTRAMUSCULAR; INTRAVENOUS EVERY 24 HOURS
Refills: 0 | Status: DISCONTINUED | OUTPATIENT
Start: 2021-09-13 | End: 2021-09-13

## 2021-09-13 RX ORDER — ALBUMIN HUMAN 25 %
100 VIAL (ML) INTRAVENOUS ONCE
Refills: 0 | Status: COMPLETED | OUTPATIENT
Start: 2021-09-13 | End: 2021-09-13

## 2021-09-13 RX ORDER — HEPARIN SODIUM 5000 [USP'U]/ML
6500 INJECTION INTRAVENOUS; SUBCUTANEOUS ONCE
Refills: 0 | Status: DISCONTINUED | OUTPATIENT
Start: 2021-09-13 | End: 2021-09-13

## 2021-09-13 RX ORDER — MAGNESIUM SULFATE 500 MG/ML
2 VIAL (ML) INJECTION ONCE
Refills: 0 | Status: COMPLETED | OUTPATIENT
Start: 2021-09-13 | End: 2021-09-13

## 2021-09-13 RX ORDER — HEPARIN SODIUM 5000 [USP'U]/ML
6500 INJECTION INTRAVENOUS; SUBCUTANEOUS EVERY 6 HOURS
Refills: 0 | Status: DISCONTINUED | OUTPATIENT
Start: 2021-09-13 | End: 2021-09-13

## 2021-09-13 RX ORDER — THIAMINE MONONITRATE (VIT B1) 100 MG
100 TABLET ORAL DAILY
Refills: 0 | Status: DISCONTINUED | OUTPATIENT
Start: 2021-09-13 | End: 2021-09-27

## 2021-09-13 RX ORDER — FOLIC ACID 0.8 MG
1 TABLET ORAL DAILY
Refills: 0 | Status: DISCONTINUED | OUTPATIENT
Start: 2021-09-13 | End: 2021-09-27

## 2021-09-13 RX ORDER — PANTOPRAZOLE SODIUM 20 MG/1
40 TABLET, DELAYED RELEASE ORAL
Refills: 0 | Status: DISCONTINUED | OUTPATIENT
Start: 2021-09-13 | End: 2021-09-27

## 2021-09-13 RX ORDER — TIOTROPIUM BROMIDE 18 UG/1
1 CAPSULE ORAL; RESPIRATORY (INHALATION) DAILY
Refills: 0 | Status: DISCONTINUED | OUTPATIENT
Start: 2021-09-13 | End: 2021-09-27

## 2021-09-13 RX ORDER — HEPARIN SODIUM 5000 [USP'U]/ML
INJECTION INTRAVENOUS; SUBCUTANEOUS
Qty: 25000 | Refills: 0 | Status: DISCONTINUED | OUTPATIENT
Start: 2021-09-13 | End: 2021-09-13

## 2021-09-13 RX ADMIN — CHLORHEXIDINE GLUCONATE 1 APPLICATION(S): 213 SOLUTION TOPICAL at 17:09

## 2021-09-13 RX ADMIN — CEFTRIAXONE 1000 MILLIGRAM(S): 500 INJECTION, POWDER, FOR SOLUTION INTRAMUSCULAR; INTRAVENOUS at 17:08

## 2021-09-13 RX ADMIN — Medication 1 MILLIGRAM(S): at 09:54

## 2021-09-13 RX ADMIN — Medication 100 MILLIGRAM(S): at 09:54

## 2021-09-13 RX ADMIN — Medication 50 GRAM(S): at 09:53

## 2021-09-13 RX ADMIN — Medication 7.31 MICROGRAM(S)/KG/MIN: at 17:00

## 2021-09-13 RX ADMIN — Medication 100 MILLIGRAM(S): at 06:55

## 2021-09-13 RX ADMIN — LACTULOSE 10 GRAM(S): 10 SOLUTION ORAL at 22:52

## 2021-09-13 RX ADMIN — Medication 50 MILLILITER(S): at 01:44

## 2021-09-13 RX ADMIN — Medication 40 MILLIEQUIVALENT(S): at 17:08

## 2021-09-13 RX ADMIN — LACTULOSE 10 GRAM(S): 10 SOLUTION ORAL at 15:06

## 2021-09-13 RX ADMIN — HEPARIN SODIUM 1300 UNIT(S)/HR: 5000 INJECTION INTRAVENOUS; SUBCUTANEOUS at 18:46

## 2021-09-13 RX ADMIN — PANTOPRAZOLE SODIUM 40 MILLIGRAM(S): 20 TABLET, DELAYED RELEASE ORAL at 09:54

## 2021-09-13 RX ADMIN — Medication 40 MILLIEQUIVALENT(S): at 09:53

## 2021-09-13 RX ADMIN — TAMSULOSIN HYDROCHLORIDE 0.4 MILLIGRAM(S): 0.4 CAPSULE ORAL at 22:53

## 2021-09-13 RX ADMIN — SODIUM CHLORIDE 75 MILLILITER(S): 9 INJECTION, SOLUTION INTRAVENOUS at 07:13

## 2021-09-13 RX ADMIN — OVINE DIGOXIN IMMUNE FAB 50 MILLIGRAM(S): 40 INJECTION, POWDER, FOR SOLUTION INTRAVENOUS at 09:53

## 2021-09-13 RX ADMIN — HEPARIN SODIUM 6000 UNIT(S): 5000 INJECTION INTRAVENOUS; SUBCUTANEOUS at 18:45

## 2021-09-13 NOTE — PROGRESS NOTE ADULT - SUBJECTIVE AND OBJECTIVE BOX
Patient is a 55y old  Male who presents with a chief complaint of Near syncope (13 Sep 2021 09:59)    24 hour events: ***    PAST MEDICAL & SURGICAL HISTORY:  Afib    GERD (gastroesophageal reflux disease)    MELISSA (acute kidney injury)    Anemia    Hyponatremia    Hypotension    Alcoholic cirrhosis    Atelectasis      REVIEW OF SYSTEMS  Constitutional: No fever, chills, fatigue  Neuro: No headache, numbness, weakness  Resp: No cough, wheezing, shortness of breath  CVS: No chest pain, palpitations, leg swelling  GI: No abdominal pain, nausea, vomiting, diarrhea   : No dysuria, frequency, incontinence  Skin: No itching, burning, rashes, or lesions   Msk: No joint pain or swelling  Psych: No depression, anxiety, mood swings  Heme: No bleeding    T(F): 97.7 (21 @ 04:20), Max: 98.8 (21 @ 16:10)  HR: 50 (21 @ 12:00) (37 - 100)  BP: 101/57 (21 @ 12:00) (52/28 - 163/90)  RR: 16 (21 @ 12:00) (11 - 20)  SpO2: 100% (21 @ 12:00) (96% - 100%)  Wt(kg): --      CAPILLARY BLOOD GLUCOSE  POCT Blood Glucose.: 124 mg/dL (12 Sep 2021 16:02)    I&O's Summary     @ 07:  -   @ 07:00  --------------------------------------------------------  IN: 1450 mL / OUT: 3300 mL / NET: -1850 mL     @ 07:01  -   @ 13:23  --------------------------------------------------------  IN: 0 mL / OUT: 325 mL / NET: -325 mL    PHYSICAL EXAM  General: middle aged male, NAD  CNS: AAOx3, no focal deficits   HEENT: PERRL, +icteric  Resp: good AE b/l, clear   CVS: S1S2, regular, nena  Abd: soft, distended, NT, +BS  Ext: no edema   Skin: warm, icteric     MEDICATIONS  cefTRIAXone Injectable. IV Push  norepinephrine Infusion IV Continuous  tamsulosin Oral  tiotropium 18 MICROgram(s) Capsule Inhalation  lactulose Syrup Oral  pantoprazole    Tablet Oral  folic acid Oral  lactated ringers. IV Continuous  potassium chloride    Tablet ER Oral  thiamine Oral  chlorhexidine 4% Liquid Topical                          11.4   9.74  )-----------( 190      ( 13 Sep 2021 06:14 )             33.5       09-13    135  |  103  |  26<H>  ----------------------------<  121<H>  3.2<L>   |  24  |  1.07    Ca    9.0      13 Sep 2021 06:14  Phos  2.7     09-13  Mg     1.7     09-13    TPro  6.9  /  Alb  2.9<L>  /  TBili  1.5<H>  /  DBili  x   /  AST  36  /  ALT  10<L>  /  AlkPhos  60  09-12    Lactate 2.0           09-12 @ 16:07      CARDIAC MARKERS ( 12 Sep 2021 19:43 )  <0.015 ng/mL / x     / x     / x     / x      CARDIAC MARKERS ( 12 Sep 2021 16:07 )  <0.015 ng/mL / x     / x     / x     / x          PT/INR - ( 12 Sep 2021 16:07 )   PT: 17.9 sec;   INR: 1.56 ratio         PTT - ( 12 Sep 2021 16:07 )  PTT:35.6 sec  Urinalysis Basic - ( 13 Sep 2021 06:00 )    Color: Yellow / Appearance: Clear / S.005 / pH: x  Gluc: x / Ketone: Negative  / Bili: Negative / Urobili: Negative mg/dL   Blood: x / Protein: Negative mg/dL / Nitrite: Negative   Leuk Esterase: Negative / RBC: x / WBC x   Sq Epi: x / Non Sq Epi: x / Bacteria: x           Patient is a 55y old  Male who presents with a chief complaint of Near syncope (13 Sep 2021 09:59)    24 hour events: remains on levo   denies any complaints today     PAST MEDICAL & SURGICAL HISTORY:  Afib    GERD (gastroesophageal reflux disease)    MELISSA (acute kidney injury)    Anemia    Hyponatremia    Hypotension    Alcoholic cirrhosis    Atelectasis      REVIEW OF SYSTEMS  Constitutional: No fever, chills, fatigue  Neuro: No headache, numbness, weakness  Resp: No cough, wheezing, shortness of breath  CVS: No chest pain, palpitations, leg swelling  GI: No abdominal pain, nausea, vomiting, diarrhea   : No dysuria, frequency, incontinence  Skin: No itching, burning, rashes, or lesions   Msk: No joint pain or swelling  Psych: No depression, anxiety, mood swings  Heme: No bleeding    T(F): 97.7 (21 @ 04:20), Max: 98.8 (21 @ 16:10)  HR: 50 (21 @ 12:00) (37 - 100)  BP: 101/57 (21 @ 12:00) (52/28 - 163/90)  RR: 16 (21 @ 12:00) (11 - 20)  SpO2: 100% (21 @ 12:00) (96% - 100%)  Wt(kg): --      CAPILLARY BLOOD GLUCOSE  POCT Blood Glucose.: 124 mg/dL (12 Sep 2021 16:02)    I&O's Summary     @ 07:  -   @ 07:00  --------------------------------------------------------  IN: 1450 mL / OUT: 3300 mL / NET: -1850 mL     @ 07:01  -   @ 13:23  --------------------------------------------------------  IN: 0 mL / OUT: 325 mL / NET: -325 mL    PHYSICAL EXAM  General: middle aged male, NAD  CNS: AAOx3, no focal deficits   HEENT: PERRL, +icteric  Resp: good AE b/l, clear   CVS: S1S2, regular, nena  Abd: soft, distended, NT, +BS  Ext: no edema   Skin: warm, icteric     MEDICATIONS  cefTRIAXone Injectable. IV Push  norepinephrine Infusion IV Continuous  tamsulosin Oral  tiotropium 18 MICROgram(s) Capsule Inhalation  lactulose Syrup Oral  pantoprazole    Tablet Oral  folic acid Oral  lactated ringers. IV Continuous  potassium chloride    Tablet ER Oral  thiamine Oral  chlorhexidine 4% Liquid Topical                          11.4   9.74  )-----------( 190      ( 13 Sep 2021 06:14 )             33.5       09-13    135  |  103  |  26<H>  ----------------------------<  121<H>  3.2<L>   |  24  |  1.07    Ca    9.0      13 Sep 2021 06:14  Phos  2.7     09-13  Mg     1.7     -13    TPro  6.9  /  Alb  2.9<L>  /  TBili  1.5<H>  /  DBili  x   /  AST  36  /  ALT  10<L>  /  AlkPhos  60  -12    Lactate 2.0           09-12 @ 16:07      CARDIAC MARKERS ( 12 Sep 2021 19:43 )  <0.015 ng/mL / x     / x     / x     / x      CARDIAC MARKERS ( 12 Sep 2021 16:07 )  <0.015 ng/mL / x     / x     / x     / x          PT/INR - ( 12 Sep 2021 16:07 )   PT: 17.9 sec;   INR: 1.56 ratio         PTT - ( 12 Sep 2021 16:07 )  PTT:35.6 sec  Urinalysis Basic - ( 13 Sep 2021 06:00 )    Color: Yellow / Appearance: Clear / S.005 / pH: x  Gluc: x / Ketone: Negative  / Bili: Negative / Urobili: Negative mg/dL   Blood: x / Protein: Negative mg/dL / Nitrite: Negative   Leuk Esterase: Negative / RBC: x / WBC x   Sq Epi: x / Non Sq Epi: x / Bacteria: x

## 2021-09-13 NOTE — CONSULT NOTE ADULT - SUBJECTIVE AND OBJECTIVE BOX
HPI:  This is a 55M with Phx of HTN, BPH, Gerd, alcoholic cirrhosis, ? chronic AF who was BIBA from [facility] rehab for dizziness, hypotension and near syncope.  He is a poor historian and the SNF records are sparse.  States he was at [facility] in July for a few weeks after he went into alcohol withdrawal.  He does not have any recollection of his time at [facility].    He was sent to [facility] SNF for physical rehab.    In [facility] until this afternoon when he felt dizzy.  He states than he has not been eating or drinking much.    In ED he was found to have MELISSA with hypotension and junctional bradycardia.  Cultured, given fluids, abx and started on nor-epi for hypotension, noted with mild hyponatremia.  At rehab center he was on Digoxin, spironolactone, lasix and Nadolol 20 BID.  Stat echo reveals LVEF 65-70%    21: EP consulted for bradycardia, junctional rhythm 30's.   Digoxin and Nadolol are discontinued.  He is on Nor-epi drip and IVF.  He is awake and alert, asymptomatic at present, denies CP, SOB, dizziness, palpitations or lightheadedness    TELEMETRY: Junctional bradycardia rate 30-40 bpm, narrow complex QRS alternates with wide QRS      PAST MEDICAL & SURGICAL HISTORY:  Afib  GERD (gastroesophageal reflux disease)  MELISSA (acute kidney injury)  Anemia  Hyponatremia  Hypotension  Alcoholic cirrhosis  Atelectasis    SOCIAL HISTORY: Etoh abuse, s/p withdrawal in 2021 and then at rehab, Denies tobacco    FAMILY HISTORY: unknown      MEDICATIONS  (STANDING):  cefTRIAXone Injectable. 1000 milliGRAM(s) IV Push every 24 hours  chlorhexidine 4% Liquid 1 Application(s) Topical <User Schedule>  folic acid 1 milliGRAM(s) Oral daily  lactated ringers. 1000 milliLiter(s) (75 mL/Hr) IV Continuous <Continuous>  norepinephrine Infusion 0.05 MICROgram(s)/kG/Min (7.31 mL/Hr) IV Continuous <Continuous>  pantoprazole    Tablet 40 milliGRAM(s) Oral before breakfast  tamsulosin 0.4 milliGRAM(s) Oral at bedtime  thiamine 100 milliGRAM(s) Oral daily  tiotropium 18 MICROgram(s) Capsule 1 Capsule(s) Inhalation daily      MEDICATIONS  (PRN):      Allergies    No Known Allergies    Vital Signs Last 24 Hrs  T(C): 36.5 (13 Sep 2021 04:20), Max: 37.1 (12 Sep 2021 16:10)  T(F): 97.7 (13 Sep 2021 04:20), Max: 98.8 (12 Sep 2021 16:10)  HR: 46 (13 Sep 2021 08:00) (38 - 100)  BP: 120/65 (13 Sep 2021 08:00) (52/28 - 163/90)  BP(mean): 78 (13 Sep 2021 08:00) (33 - 97)  RR: 11 (13 Sep 2021 08:00) (11 - 20)  SpO2: 97% (13 Sep 2021 02:00) (96% - 100%)      REVIEW OF SYSTEMS:    CONSTITUTIONAL:  As per HPI.  HEENT:  Eyes:  No diplopia or blurred vision. ENT:  No earache, sore throat or runny nose.  CARDIOVASCULAR:  No pressure, squeezing, strangling, tightness, heaviness or aching about the chest, neck, axilla or epigastrium.  RESPIRATORY:  No cough, shortness of breath, PND or orthopnea.  GASTROINTESTINAL:  No nausea, vomiting or diarrhea.  GENITOURINARY:  No dysuria, frequency or urgency.  MUSCULOSKELETAL:  As per HPI.  SKIN:  No change in skin, hair or nails.  NEUROLOGIC:  No paresthesias, fasciculations, seizures or weakness.  PSYCHIATRIC:  No disorder of thought or mood.  ENDOCRINE:  No heat or cold intolerance, polyuria or polydipsia.  HEMATOLOGICAL:  No easy bruising or bleedings:      PHYSICAL EXAMINATION:    GENERAL APPEARANCE:  Pt. is not currently dyspneic, in no distress. Pt. is A & O x 3, poor historian, slow to answer some questions.  HEENT:  Pupils are normal and react normally. No icterus. Mucous membranes well colored.  NECK:  Supple. No lymphadenopathy. Jugular venous pressure not elevated. Carotids equal.   HEART:   S1S2 irreg. There are no murmurs, rubs or gallops noted  CHEST:  Chest is clear to auscultation. Normal respiratory effort.  ABDOMEN:  Soft and nontender.   EXTREMITIES:  There is no edema.   SKIN:  No rash or significant lesions are noted.    I&O's Summary    12 Sep 2021 07:01  -  13 Sep 2021 07:00  --------------------------------------------------------  IN: 1450 mL / OUT: 3300 mL / NET: -1850 mL    13 Sep 2021 07:01  -  13 Sep 2021 10:01  --------------------------------------------------------  IN: 0 mL / OUT: 325 mL / NET: -325 mL        LABS:                        11.4   9.74  )-----------( 190      ( 13 Sep 2021 06:14 )             33.5     13    135  |  103  |  26<H>  ----------------------------<  121<H>  3.2<L>   |  24  |  1.07    Ca    9.0      13 Sep 2021 06:14  Phos  2.7     13  Mg     1.7     13    TPro  6.9  /  Alb  2.9<L>  /  TBili  1.5<H>  /  DBili  x   /  AST  36  /  ALT  10<L>  /  AlkPhos  60  12      CARDIAC MARKERS ( 12 Sep 2021 19:43 )  <0.015 ng/mL / x     / x     / x     / x      CARDIAC MARKERS ( 12 Sep 2021 16:07 )  <0.015 ng/mL / x     / x     / x     / x            LIVER FUNCTIONS - ( 12 Sep 2021 16:07 )    Alb: 2.9 g/dL / Pro: 6.9 gm/dL / ALK PHOS: 60 U/L / ALT: 10 U/L / AST: 36 U/L / GGT: x           Ammonia level : 73    PT/INR - ( 12 Sep 2021 16:07 )   PT: 17.9 sec;   INR: 1.56 ratio       PTT - ( 12 Sep 2021 16:07 )  PTT:35.6 sec    Serum Pro-Brain Natriuretic Peptide: 789 pg/mL (21 @ 16:07)    Urinalysis Basic - ( 13 Sep 2021 06:00 )    Color: Yellow / Appearance: Clear / S.005 / pH: x  Gluc: x / Ketone: Negative  / Bili: Negative / Urobili: Negative mg/dL   Blood: x / Protein: Negative mg/dL / Nitrite: Negative   Leuk Esterase: Negative / RBC: x / WBC x   Sq Epi: x / Non Sq Epi: x / Bacteria: x            CARDIAC TESTS:  < from: TTE Echo Complete w/o Contrast w/ Doppler (21 @ 19:06) >     Impression     Summary     Mild mitral annular calcification is present.   Fibrocalcific changes noted to the mitral valve leaflets with preserved   leaflet excursion.   Mild (1+) mitral regurgitation is present.   The aortic valve is trileaflet with thin pliable leaflets.   The tricuspid valve leaflets are thin and pliable; valve motion is normal.   Moderate (2+) tricuspid valve regurgitation is present.   Mild pulmonary hypertension.   The left atrium is severely dilated.   The left ventricle is normal in size, wall thickness, wall motion and   contractility.   Estimated left ventricular ejection fraction is 65-70 %.   The right atrium appears severely dilated.   Normal appearing right ventricle structure and function.   The IVC appears normal.              RADIOLOGY & ADDITIONAL STUDIES:

## 2021-09-13 NOTE — PROGRESS NOTE ADULT - SUBJECTIVE AND OBJECTIVE BOX
Patient is a 55y old  Male who presents with a chief complaint of Near syncope (13 Sep 2021 13:23)    HPI:  55M hx HTN BPH Gerd chronic a fib ??? on pradaxa.  GERD alcoholic cirrhosis     First time in [place].  His hx is not really reliable and the SNF records are sparse.  States he was at [facility] in July for a few weeks after he went into alcohol withdrawal.  He does not have any recollection of his time at [facility].      He was sent to [facility] for physical rehab.      In [facility] until this afternoon when her felt dizzy.  He states than he has not been eating or drinking much.    Admit with MELISSA hypotension and junctional nena cardia.  Cultured given fluids abx and started on nor-epi  noted with mild hyponatremia  He is on a bunch of diuretics nadolol and digoxin.  ,          (12 Sep 2021 23:06)    Allergies: No Known Allergies    PAST MEDICAL & SURGICAL HISTORY:  Afib    GERD (gastroesophageal reflux disease)    MELISSA (acute kidney injury)    Anemia    Hyponatremia    Hypotension    Alcoholic cirrhosis    Atelectasis      FAMILY HISTORY:    SOCIAL HISTORY:    Home Medications:    Review of Systems:  Constitutional: no fever, chills, fatigue  Neuro: no headache, numbness, weakness  Resp: no cough, wheezing, shortness of breath  CVS: no chest pain, palpitations, leg swelling  GI: no abdominal pain, nausea, vomiting, diarrhea   : no dysuria, frequency, incontinence  Skin: no itching, burning, rashes, or lesions   Msk: no joint pain or swelling  Psych: no depression, anxiety, mood swings    T(F): 97.4 (21 @ 00:40), Max: 97.7 (21 @ 04:20)  HR: 50 (21 @ 02:00) (37 - 55)  BP: 119/74 (21 @ 02:00) (91/43 - 143/72)  RR: 15 (21 @ 02:00) (9 - 20)  SpO2: 98% (21 @ 02:00)  Wt(kg): --    CAPILLARY BLOOD GLUCOSE      POCT Blood Glucose.: 124 mg/dL (12 Sep 2021 16:02)    I&O's Summary    12 Sep 2021 07:01  -  13 Sep 2021 07:00  --------------------------------------------------------  IN: 1450 mL / OUT: 3300 mL / NET: -1850 mL    13 Sep 2021 07:01  -  14 Sep 2021 03:30  --------------------------------------------------------  IN: 1393 mL / OUT: 1465 mL / NET: -72 mL        Physical Exam:     Gen:  Neuro:  HEENT:  CVS:  Resp:  Abd:  Ext:  Skin:    Meds:  cefTRIAXone Injectable. 1000 milliGRAM(s) IV Push every 24 hours  rifAXIMin 550 milliGRAM(s) Oral two times a day    norepinephrine Infusion 0.05 MICROgram(s)/kG/Min IV Continuous <Continuous>  tamsulosin 0.4 milliGRAM(s) Oral at bedtime        tiotropium 18 MICROgram(s) Capsule 1 Capsule(s) Inhalation daily           heparin   Injectable 6000 Unit(s) IV Push every 6 hours PRN  heparin   Injectable 3000 Unit(s) IV Push every 6 hours PRN  heparin  Infusion.  Unit(s)/Hr IV Continuous <Continuous>     lactulose Syrup 10 Gram(s) Oral every 8 hours  pantoprazole    Tablet 40 milliGRAM(s) Oral before breakfast        folic acid 1 milliGRAM(s) Oral daily  lactated ringers. 1000 milliLiter(s) IV Continuous <Continuous>  thiamine 100 milliGRAM(s) Oral daily        chlorhexidine 4% Liquid 1 Application(s) Topical <User Schedule>                              10.8   11.00 )-----------( 184      ( 14 Sep 2021 00:57 )             31.8       09-13    135  |  103  |  26<H>  ----------------------------<  121<H>  3.2<L>   |  24  |  1.07    Ca    9.0      13 Sep 2021 06:14  Phos  2.7     09-13  Mg     1.7         TPro  6.9  /  Alb  2.9<L>  /  TBili  1.5<H>  /  DBili  x   /  AST  36  /  ALT  10<L>  /  AlkPhos  60        CARDIAC MARKERS ( 12 Sep 2021 19:43 )  <0.015 ng/mL / x     / x     / x     / x      CARDIAC MARKERS ( 12 Sep 2021 16:07 )  <0.015 ng/mL / x     / x     / x     / x          PT/INR - ( 12 Sep 2021 16:07 )   PT: 17.9 sec;   INR: 1.56 ratio         PTT - ( 14 Sep 2021 00:57 )  PTT:198.4 sec  Urinalysis Basic - ( 13 Sep 2021 06:00 )    Color: Yellow / Appearance: Clear / S.005 / pH: x  Gluc: x / Ketone: Negative  / Bili: Negative / Urobili: Negative mg/dL   Blood: x / Protein: Negative mg/dL / Nitrite: Negative   Leuk Esterase: Negative / RBC: x / WBC x   Sq Epi: x / Non Sq Epi: x / Bacteria: x      .Blood None   No growth to date. --  @ 19:43  .Blood Blood-Peripheral   No growth to date. --  @ 16:07          Radiology:     EXAM:  XR CHEST PORTABLE URGENT 1V                            PROCEDURE DATE:  2021          INTERPRETATION:  Chest one view    HISTORY: Syncope    COMPARISON STUDY: 2021    Frontal expiratory view of the chest shows the heart to be similar in size. The lungs are clear and there is no evidence of pneumothorax nor pleural effusion.    IMPRESSION:  No active pulmonary disease.    Thank you for the courtesy of this referral.    --- End of Report ---            TYRA DOYLE MD; Attending Interventional Radiologist  This document has been electronically signed. Sep 13 2021  4:01PM      Assessment/Plan:      -Levophed gtt for hypotension. Actively titrating for MAP >65.   -Holding Midodrine given bradycardia  -    Critical care time spent (mins): *** Patient is a 55y old  Male who presents with a chief complaint of Near syncope (13 Sep 2021 13:23)    HPI:  55M hx HTN BPH Gerd chronic a fib on pradaxa.  GERD alcoholic cirrhosis presented to ED from rehab with near syncope. Patient noted to be hypotensive and started on vasporessors. Patient hospital course further complicated by hyponatremia and bradycardia    At bedside patient remains on Levophed gtt to support blood pressure. Denies any acute complaints         (12 Sep 2021 23:06)    Allergies: No Known Allergies    PAST MEDICAL & SURGICAL HISTORY:  Afib    GERD (gastroesophageal reflux disease)    MELISSA (acute kidney injury)    Anemia    Hyponatremia    Hypotension    Alcoholic cirrhosis    Atelectasis      FAMILY HISTORY:    SOCIAL HISTORY:    Home Medications:    Review of Systems:  ROS as noted above, all others negative    T(F): 97.4 (21 @ 00:40), Max: 97.7 (21 @ 04:20)  HR: 50 (21 @ 02:00) (37 - 55)  BP: 119/74 (21 @ 02:00) (91/43 - 143/72)  RR: 15 (21 @ 02:00) (9 - 20)  SpO2: 98% (21 @ 02:00)  Wt(kg): --    CAPILLARY BLOOD GLUCOSE      POCT Blood Glucose.: 124 mg/dL (12 Sep 2021 16:02)    I&O's Summary    12 Sep 2021 07:  -  13 Sep 2021 07:00  --------------------------------------------------------  IN: 1450 mL / OUT: 3300 mL / NET: -1850 mL    13 Sep 2021 07:  -  14 Sep 2021 03:30  --------------------------------------------------------  IN: 1393 mL / OUT: 1465 mL / NET: -72 mL        Physical Exam:     Gen: ill appearing, jaundice  Neuro: awake/alert  CVS: +S1S2  Resp: CTA  Abd: soft, distended, NT  Ext: warm, dry no edema  Skin: jaundice    Meds:  cefTRIAXone Injectable. 1000 milliGRAM(s) IV Push every 24 hours  rifAXIMin 550 milliGRAM(s) Oral two times a day    norepinephrine Infusion 0.05 MICROgram(s)/kG/Min IV Continuous <Continuous>  tamsulosin 0.4 milliGRAM(s) Oral at bedtime        tiotropium 18 MICROgram(s) Capsule 1 Capsule(s) Inhalation daily           heparin   Injectable 6000 Unit(s) IV Push every 6 hours PRN  heparin   Injectable 3000 Unit(s) IV Push every 6 hours PRN  heparin  Infusion.  Unit(s)/Hr IV Continuous <Continuous>     lactulose Syrup 10 Gram(s) Oral every 8 hours  pantoprazole    Tablet 40 milliGRAM(s) Oral before breakfast        folic acid 1 milliGRAM(s) Oral daily  lactated ringers. 1000 milliLiter(s) IV Continuous <Continuous>  thiamine 100 milliGRAM(s) Oral daily        chlorhexidine 4% Liquid 1 Application(s) Topical <User Schedule>                              10.8   11.00 )-----------( 184      ( 14 Sep 2021 00:57 )             31.8       09-13    135  |  103  |  26<H>  ----------------------------<  121<H>  3.2<L>   |  24  |  1.07    Ca    9.0      13 Sep 2021 06:14  Phos  2.7     09-13  Mg     1.7     09-13    TPro  6.9  /  Alb  2.9<L>  /  TBili  1.5<H>  /  DBili  x   /  AST  36  /  ALT  10<L>  /  AlkPhos  60  09-12      CARDIAC MARKERS ( 12 Sep 2021 19:43 )  <0.015 ng/mL / x     / x     / x     / x      CARDIAC MARKERS ( 12 Sep 2021 16:07 )  <0.015 ng/mL / x     / x     / x     / x          PT/INR - ( 12 Sep 2021 16:07 )   PT: 17.9 sec;   INR: 1.56 ratio         PTT - ( 14 Sep 2021 00:57 )  PTT:198.4 sec  Urinalysis Basic - ( 13 Sep 2021 06:00 )    Color: Yellow / Appearance: Clear / S.005 / pH: x  Gluc: x / Ketone: Negative  / Bili: Negative / Urobili: Negative mg/dL   Blood: x / Protein: Negative mg/dL / Nitrite: Negative   Leuk Esterase: Negative / RBC: x / WBC x   Sq Epi: x / Non Sq Epi: x / Bacteria: x      .Blood None   No growth to date. --  @ 19:43  .Blood Blood-Peripheral   No growth to date. --  @ 16:07          Radiology:     EXAM:  XR CHEST PORTABLE URGENT 1V                            PROCEDURE DATE:  2021          INTERPRETATION:  Chest one view    HISTORY: Syncope    COMPARISON STUDY: 2021    Frontal expiratory view of the chest shows the heart to be similar in size. The lungs are clear and there is no evidence of pneumothorax nor pleural effusion.    IMPRESSION:  No active pulmonary disease.    Thank you for the courtesy of this referral.    --- End of Report ---            TYRA DOYLE MD; Attending Interventional Radiologist  This document has been electronically signed. Sep 13 2021  4:01PM      -Shock  -Brdycardia  -Alcoholic Cirrhosis      Assessment/Plan:  54 yo M pmhx Alcoholic Cirrhosis, Anemia, MELISSA, Afib on Pradaxa and GERD presented with near syncope. Admit with bradycardia and shock    -Levophed gtt for shock state. Actively titrating for MAP >65.   -Holding Midodrine given bradycardia  -Bradycardia w/ HR in 40's; holding Nadolol and Digoxin. s/p Digibind. Keep K >4 and Mg >2. Potassium supplemented earlier. Keep K >4 and Mg >2. Potassium supplemented earlier  -Alcoholic Cirrhosis; Ammonia elevated. Rifaximin daily  -Empiric abx coverage w/ Ceftriaxone. Cultures sent and bending  -Full AC w/ Heparin gtt for Afib    Critical care time spent (mins): 38 minutes including time spent reviewing chart, ordering tests/labs, discussing with interdisciplinary team. Not including time spent performing procedures.

## 2021-09-13 NOTE — CONSULT NOTE ADULT - ASSESSMENT
55 yr old male with above history BIBA from [facility] rehab for dizziness, hypotension and near syncope.  Pt states that he does not eat the food there and family brings him meals, he hasn't been eating much.  He was dizzy when he stood up and was found to be hypotensive.  He is a poor historian with details.  Nadolol, digoxin, diuretics on hold.  He is on pressors and IV fluids.  Digibind was ordered by intensivist this morning, Dig level 1.07. unknown why not on OAC with history of AF.      A/P:  near syncope, bradycardia, hypotension.  Continue tele monitoring and CCU care  Wean pressors per ICU staff if BP permits.  Continue to hold Nadolol, Digoxin, diuretics.  Digibind per intensivist.  Replace K and Mg, keep K>4.0, Mg>2.0  Continue IVF  LVEF 65-70%, +1MR, +2TR  Ammonia elevated  EKG to assess QTc  Plan discussed with Dr. Mckinley

## 2021-09-13 NOTE — PROGRESS NOTE ADULT - ASSESSMENT
Shock   Slow a.fib/junctional bradycardia - likely in the setting of digoxin, nadolol   Hepatic encephalopathy   Hx A.fib on Pradaxa, alcoholic cirrhosis, GERD     Neuro stable   Add lactulose, rifaximin   Last etoh consumption in June 2021  FA, thiamine   Continue levo, did not respond to steroid, digibind  Does not appears septic/toxic, continue empiric ceftriaxone for now, likely short course   No active bleeding, start iv heparin for a.fib as d/w EP, hold DOAC    Further w/u and mx based on clinical course    Patient critically ill with high risk for decompensation

## 2021-09-14 LAB
ALBUMIN SERPL ELPH-MCNC: 3 G/DL — LOW (ref 3.3–5)
ALP SERPL-CCNC: 53 U/L — SIGNIFICANT CHANGE UP (ref 40–120)
ALT FLD-CCNC: 8 U/L — LOW (ref 12–78)
AMMONIA BLD-MCNC: 21 UMOL/L — SIGNIFICANT CHANGE UP (ref 11–32)
ANION GAP SERPL CALC-SCNC: 7 MMOL/L — SIGNIFICANT CHANGE UP (ref 5–17)
APTT BLD: 155.5 SEC — CRITICAL HIGH (ref 27.5–35.5)
APTT BLD: 198.4 SEC — CRITICAL HIGH (ref 27.5–35.5)
APTT BLD: 77.6 SEC — HIGH (ref 27.5–35.5)
AST SERPL-CCNC: 21 U/L — SIGNIFICANT CHANGE UP (ref 15–37)
BASOPHILS # BLD AUTO: 0.03 K/UL — SIGNIFICANT CHANGE UP (ref 0–0.2)
BASOPHILS NFR BLD AUTO: 0.3 % — SIGNIFICANT CHANGE UP (ref 0–2)
BILIRUB SERPL-MCNC: 1.2 MG/DL — SIGNIFICANT CHANGE UP (ref 0.2–1.2)
BUN SERPL-MCNC: 18 MG/DL — SIGNIFICANT CHANGE UP (ref 7–23)
CALCIUM SERPL-MCNC: 9.1 MG/DL — SIGNIFICANT CHANGE UP (ref 8.5–10.1)
CHLORIDE SERPL-SCNC: 106 MMOL/L — SIGNIFICANT CHANGE UP (ref 96–108)
CO2 SERPL-SCNC: 24 MMOL/L — SIGNIFICANT CHANGE UP (ref 22–31)
CREAT SERPL-MCNC: 1.03 MG/DL — SIGNIFICANT CHANGE UP (ref 0.5–1.3)
EOSINOPHIL # BLD AUTO: 0.12 K/UL — SIGNIFICANT CHANGE UP (ref 0–0.5)
EOSINOPHIL NFR BLD AUTO: 1.3 % — SIGNIFICANT CHANGE UP (ref 0–6)
GLUCOSE SERPL-MCNC: 106 MG/DL — HIGH (ref 70–99)
HCT VFR BLD CALC: 30.8 % — LOW (ref 39–50)
HCT VFR BLD CALC: 31.8 % — LOW (ref 39–50)
HGB BLD-MCNC: 10.3 G/DL — LOW (ref 13–17)
HGB BLD-MCNC: 10.8 G/DL — LOW (ref 13–17)
IMM GRANULOCYTES NFR BLD AUTO: 0.2 % — SIGNIFICANT CHANGE UP (ref 0–1.5)
INR BLD: 1.5 RATIO — HIGH (ref 0.88–1.16)
LYMPHOCYTES # BLD AUTO: 2.54 K/UL — SIGNIFICANT CHANGE UP (ref 1–3.3)
LYMPHOCYTES # BLD AUTO: 27.7 % — SIGNIFICANT CHANGE UP (ref 13–44)
MAGNESIUM SERPL-MCNC: 2.4 MG/DL — SIGNIFICANT CHANGE UP (ref 1.6–2.6)
MCHC RBC-ENTMCNC: 30.3 PG — SIGNIFICANT CHANGE UP (ref 27–34)
MCHC RBC-ENTMCNC: 30.3 PG — SIGNIFICANT CHANGE UP (ref 27–34)
MCHC RBC-ENTMCNC: 33.4 GM/DL — SIGNIFICANT CHANGE UP (ref 32–36)
MCHC RBC-ENTMCNC: 34 GM/DL — SIGNIFICANT CHANGE UP (ref 32–36)
MCV RBC AUTO: 89.3 FL — SIGNIFICANT CHANGE UP (ref 80–100)
MCV RBC AUTO: 90.6 FL — SIGNIFICANT CHANGE UP (ref 80–100)
MELD SCORE WITH DIALYSIS: 25 POINTS — SIGNIFICANT CHANGE UP
MELD SCORE WITHOUT DIALYSIS: 12 POINTS — SIGNIFICANT CHANGE UP
MONOCYTES # BLD AUTO: 0.69 K/UL — SIGNIFICANT CHANGE UP (ref 0–0.9)
MONOCYTES NFR BLD AUTO: 7.5 % — SIGNIFICANT CHANGE UP (ref 2–14)
NEUTROPHILS # BLD AUTO: 5.78 K/UL — SIGNIFICANT CHANGE UP (ref 1.8–7.4)
NEUTROPHILS NFR BLD AUTO: 63 % — SIGNIFICANT CHANGE UP (ref 43–77)
PHOSPHATE SERPL-MCNC: 2.1 MG/DL — LOW (ref 2.5–4.5)
PLATELET # BLD AUTO: 143 K/UL — LOW (ref 150–400)
PLATELET # BLD AUTO: 184 K/UL — SIGNIFICANT CHANGE UP (ref 150–400)
POTASSIUM SERPL-MCNC: 3.3 MMOL/L — LOW (ref 3.5–5.3)
POTASSIUM SERPL-SCNC: 3.3 MMOL/L — LOW (ref 3.5–5.3)
PROT SERPL-MCNC: 6.5 GM/DL — SIGNIFICANT CHANGE UP (ref 6–8.3)
PROTHROM AB SERPL-ACNC: 17.2 SEC — HIGH (ref 10.6–13.6)
RBC # BLD: 3.4 M/UL — LOW (ref 4.2–5.8)
RBC # BLD: 3.56 M/UL — LOW (ref 4.2–5.8)
RBC # FLD: 14.1 % — SIGNIFICANT CHANGE UP (ref 10.3–14.5)
RBC # FLD: 14.3 % — SIGNIFICANT CHANGE UP (ref 10.3–14.5)
SODIUM SERPL-SCNC: 137 MMOL/L — SIGNIFICANT CHANGE UP (ref 135–145)
WBC # BLD: 11 K/UL — HIGH (ref 3.8–10.5)
WBC # BLD: 9.18 K/UL — SIGNIFICANT CHANGE UP (ref 3.8–10.5)
WBC # FLD AUTO: 11 K/UL — HIGH (ref 3.8–10.5)
WBC # FLD AUTO: 9.18 K/UL — SIGNIFICANT CHANGE UP (ref 3.8–10.5)

## 2021-09-14 PROCEDURE — 99291 CRITICAL CARE FIRST HOUR: CPT

## 2021-09-14 PROCEDURE — 93010 ELECTROCARDIOGRAM REPORT: CPT

## 2021-09-14 RX ORDER — POTASSIUM PHOSPHATE, MONOBASIC POTASSIUM PHOSPHATE, DIBASIC 236; 224 MG/ML; MG/ML
30 INJECTION, SOLUTION INTRAVENOUS ONCE
Refills: 0 | Status: COMPLETED | OUTPATIENT
Start: 2021-09-14 | End: 2021-09-14

## 2021-09-14 RX ADMIN — LACTULOSE 10 GRAM(S): 10 SOLUTION ORAL at 22:37

## 2021-09-14 RX ADMIN — HEPARIN SODIUM 1100 UNIT(S)/HR: 5000 INJECTION INTRAVENOUS; SUBCUTANEOUS at 03:03

## 2021-09-14 RX ADMIN — Medication 7.31 MICROGRAM(S)/KG/MIN: at 15:00

## 2021-09-14 RX ADMIN — HEPARIN SODIUM 900 UNIT(S)/HR: 5000 INJECTION INTRAVENOUS; SUBCUTANEOUS at 10:41

## 2021-09-14 RX ADMIN — LACTULOSE 10 GRAM(S): 10 SOLUTION ORAL at 13:30

## 2021-09-14 RX ADMIN — POTASSIUM PHOSPHATE, MONOBASIC POTASSIUM PHOSPHATE, DIBASIC 83.33 MILLIMOLE(S): 236; 224 INJECTION, SOLUTION INTRAVENOUS at 10:40

## 2021-09-14 RX ADMIN — Medication 1 MILLIGRAM(S): at 10:41

## 2021-09-14 RX ADMIN — HEPARIN SODIUM 0 UNIT(S)/HR: 5000 INJECTION INTRAVENOUS; SUBCUTANEOUS at 02:00

## 2021-09-14 RX ADMIN — PANTOPRAZOLE SODIUM 40 MILLIGRAM(S): 20 TABLET, DELAYED RELEASE ORAL at 10:40

## 2021-09-14 RX ADMIN — Medication 100 MILLIGRAM(S): at 10:40

## 2021-09-14 RX ADMIN — TIOTROPIUM BROMIDE 1 CAPSULE(S): 18 CAPSULE ORAL; RESPIRATORY (INHALATION) at 07:56

## 2021-09-14 RX ADMIN — CEFTRIAXONE 1000 MILLIGRAM(S): 500 INJECTION, POWDER, FOR SOLUTION INTRAMUSCULAR; INTRAVENOUS at 17:30

## 2021-09-14 RX ADMIN — HEPARIN SODIUM 0 UNIT(S)/HR: 5000 INJECTION INTRAVENOUS; SUBCUTANEOUS at 09:36

## 2021-09-14 RX ADMIN — HEPARIN SODIUM 900 UNIT(S)/HR: 5000 INJECTION INTRAVENOUS; SUBCUTANEOUS at 17:30

## 2021-09-14 RX ADMIN — LACTULOSE 10 GRAM(S): 10 SOLUTION ORAL at 06:25

## 2021-09-14 RX ADMIN — CHLORHEXIDINE GLUCONATE 1 APPLICATION(S): 213 SOLUTION TOPICAL at 10:41

## 2021-09-14 RX ADMIN — TAMSULOSIN HYDROCHLORIDE 0.4 MILLIGRAM(S): 0.4 CAPSULE ORAL at 22:38

## 2021-09-14 NOTE — DIETITIAN INITIAL EVALUATION ADULT. - OTHER INFO
55M hx HTN BPH Gerd chronic a fib on Pradaxa.  GERD alcoholic cirrhosis presented to ED from rehab with near syncope. Patient noted to be hypotensive and started on vasopressors. Patient hospital course further complicated by hyponatremia and bradycardia. -Alcoholic Cirrhosis; Ammonia elevated. Rifaximin daily. 24 hr diet recall indicates inadequate po intake and protein intake. Pt willing to trial protein enriched Ensure max (30 gm protein). Appetite as per patient slightly improving denies any N/V/D. No bowel movement documented since admission x 2 days. Lactulose ordered at this time. On protocol for CIWA- thiamine and folic acid however no MVI w/minerals ordered at this time. NFPE indicates mild/moderate muscle/fat wasting. Will continue to monitor and follow up prn. See below for recommendations.

## 2021-09-14 NOTE — DIETITIAN INITIAL EVALUATION ADULT. - PERTINENT MEDS FT
MEDICATIONS  (STANDING):  cefTRIAXone Injectable. 1000 milliGRAM(s) IV Push every 24 hours  chlorhexidine 4% Liquid 1 Application(s) Topical <User Schedule>  folic acid 1 milliGRAM(s) Oral daily  heparin  Infusion.  Unit(s)/Hr (13 mL/Hr) IV Continuous <Continuous>  lactulose Syrup 10 Gram(s) Oral every 8 hours  norepinephrine Infusion 0.05 MICROgram(s)/kG/Min (7.31 mL/Hr) IV Continuous <Continuous>  pantoprazole    Tablet 40 milliGRAM(s) Oral before breakfast  rifAXIMin 550 milliGRAM(s) Oral two times a day  tamsulosin 0.4 milliGRAM(s) Oral at bedtime  thiamine 100 milliGRAM(s) Oral daily  tiotropium 18 MICROgram(s) Capsule 1 Capsule(s) Inhalation daily    MEDICATIONS  (PRN):  heparin   Injectable 6000 Unit(s) IV Push every 6 hours PRN For aPTT less than 40  heparin   Injectable 3000 Unit(s) IV Push every 6 hours PRN For aPTT between 40 - 57

## 2021-09-14 NOTE — DIETITIAN INITIAL EVALUATION ADULT. - REASON FOR ADMISSION
Spoke with patient  His appointment is still on his appointment desk as schedueled  Meaning his appointment is NOT CANCELED      He is aware to be there at 6:55pm    No further action Near syncope

## 2021-09-14 NOTE — DIETITIAN INITIAL EVALUATION ADULT. - PERTINENT LABORATORY DATA
09-14    137  |  106  |  18  ----------------------------<  106<H>  3.3<L>   |  24  |  1.03    Ca    9.1      14 Sep 2021 08:28  Phos  2.1     09-14  Mg     2.4     09-14    TPro  6.5  /  Alb  3.0<L>  /  TBili  1.2  /  DBili  x   /  AST  21  /  ALT  8<L>  /  AlkPhos  53  09-14  BMI: BMI (kg/m2): 25.7 (09-13-21 @ 07:32)  HbA1c:   Glucose: POCT Blood Glucose.: 124 mg/dL (09-12-21 @ 16:02)    BP: 115/68 (09-14-21 @ 12:00) (52/28 - 163/90)  Lipid Panel:

## 2021-09-14 NOTE — DIETITIAN INITIAL EVALUATION ADULT. - ORAL INTAKE PTA/DIET HISTORY
Poor po intake as per patient during rehab admission PTA disliked food. Intake declined with weight loss.

## 2021-09-14 NOTE — DIETITIAN INITIAL EVALUATION ADULT. - MALNUTRITION
criteria met for severe protein/calorie malnutrition in context of acute illness criteria met for severe protein/calorie malnutrition in context of acute illness AEB inability to consume adequate intake to meet ENN 2/2 cardiac event.

## 2021-09-14 NOTE — DIETITIAN INITIAL EVALUATION ADULT. - ADD RECOMMEND
1) continue with regular diet and encourage protein enriched foods with all meals. 2) add to CIWA protocol meds MVI w/ minerals to meet RDI's 3) monitor daily weights 4) monitor lytes and hydration replete as needed. 5) monitor BM if none x > 3 days increase bowel meds

## 2021-09-14 NOTE — PROGRESS NOTE ADULT - ASSESSMENT
55 yr old male with above history BIBA from [facility] rehab for dizziness, hypotension and near syncope.  Pt states that he does not eat the food there and family brings him meals, he hasn't been eating much.  He was dizzy when he stood up and was found to be hypotensive.  He is a poor historian with details.  Nadolol, digoxin, diuretics on hold.  He is on pressors and IV fluids.  Digibind was ordered by intensivist this morning, Dig level 1.07. unknown why not on OAC with history of AF.      A/P:  near syncope, bradycardia, hypotension.  Continue tele monitoring and CCU care  Wean pressors per ICU staff if BP permits.  Continue to hold Nadolol, Digoxin, diuretics.  Digibind per intensivist.  Replace K and Mg, keep K>4.0, Mg>2.0  LVEF 65-70%, +1MR, +2TR  Pacing not indicated at this time  Will continue to follow  Plan discussed with patient, intensivist and Dr. Mckinley

## 2021-09-14 NOTE — PROGRESS NOTE ADULT - ASSESSMENT
Shock   Slow a.fib/junctional bradycardia - likely in the setting of digoxin, nadolol   Hepatic encephalopathy   Hx A.fib on Pradaxa, alcoholic cirrhosis, GERD     HR and BP improved overall  patient feels fine   continue lactulose, rifaximin   Last etoh consumption in June 2021  FA, thiamine   Does not appears septic/toxic, continue empiric ceftriaxone for now, likely short course   No active bleeding, continue iv heparin for a.fib as d/w EP, hold DOAC    Further w/u and mx based on clinical course    Patient critically ill with high risk for decompensation

## 2021-09-14 NOTE — PROGRESS NOTE ADULT - SUBJECTIVE AND OBJECTIVE BOX
Patient is a 55y old  Male who presents with a chief complaint of Near syncope (14 Sep 2021 12:32)    24 hour events: improving levo requirement     PAST MEDICAL & SURGICAL HISTORY:  Afib    GERD (gastroesophageal reflux disease)    MELISSA (acute kidney injury)    Anemia    Hyponatremia    Hypotension    Alcoholic cirrhosis    Atelectasis      REVIEW OF SYSTEMS  Constitutional: No fever, chills, fatigue  Neuro: No headache, numbness, weakness  Resp: No cough, wheezing, shortness of breath  CVS: No chest pain, palpitations, leg swelling  GI: No abdominal pain, nausea, vomiting, diarrhea   : No dysuria, frequency, incontinence  Skin: No itching, burning, rashes, or lesions   Msk: No joint pain or swelling  Psych: No depression, anxiety, mood swings  Heme: No bleeding    T(F): 96.9 (21 @ 11:27), Max: 97.6 (21 @ 05:07)  HR: 52 (21 @ 13:00) (40 - 55)  BP: 111/71 (21 @ 13:00) (96/61 - 143/72)  RR: 20 (21 @ 13:00) (9 - 20)  SpO2: 98% (21 @ 07:59) (91% - 100%)  Wt(kg): --      CAPILLARY BLOOD GLUCOSE    I&O's Summary     @ 07:01  -   @ 07:00  --------------------------------------------------------  IN: 2566 mL / OUT: 1965 mL / NET: 601 mL    PHYSICAL EXAM  General: middle aged male, NAD  CNS: AAOx3, no focal deficits   HEENT: PERRL, +icteric  Resp: good AE b/l, clear   CVS: S1S2, regular, nena  Abd: soft, distended, NT, +BS  Ext: no edema   Skin: warm, icteric     MEDICATIONS  cefTRIAXone Injectable. IV Push  rifAXIMin Oral  norepinephrine Infusion IV Continuous  tamsulosin Oral  tiotropium 18 MICROgram(s) Capsule Inhalation  heparin   Injectable IV Push PRN  heparin   Injectable IV Push PRN  heparin  Infusion. IV Continuous  lactulose Syrup Oral  pantoprazole    Tablet Oral  folic acid Oral  thiamine Oral  chlorhexidine 4% Liquid Topical                          10.3   9.18  )-----------( 143      ( 14 Sep 2021 08:28 )             30.8       14    137  |  106  |  18  ----------------------------<  106<H>  3.3<L>   |  24  |  1.03    Ca    9.1      14 Sep 2021 08:28  Phos  2.1       Mg     2.4         TPro  6.5  /  Alb  3.0<L>  /  TBili  1.2  /  DBili  x   /  AST  21  /  ALT  8<L>  /  AlkPhos  53  14      CARDIAC MARKERS ( 12 Sep 2021 19:43 )  <0.015 ng/mL / x     / x     / x     / x      CARDIAC MARKERS ( 12 Sep 2021 16:07 )  <0.015 ng/mL / x     / x     / x     / x          PT/INR - ( 14 Sep 2021 08:28 )   PT: 17.2 sec;   INR: 1.50 ratio         PTT - ( 14 Sep 2021 08:28 )  PTT:155.5 sec  Urinalysis Basic - ( 13 Sep 2021 06:00 )    Color: Yellow / Appearance: Clear / S.005 / pH: x  Gluc: x / Ketone: Negative  / Bili: Negative / Urobili: Negative mg/dL   Blood: x / Protein: Negative mg/dL / Nitrite: Negative   Leuk Esterase: Negative / RBC: x / WBC x   Sq Epi: x / Non Sq Epi: x / Bacteria: x      .Blood None   No growth to date. --  @ 19:43  .Blood Blood-Peripheral   No growth to date. --  @ 16:07

## 2021-09-14 NOTE — PROGRESS NOTE ADULT - SUBJECTIVE AND OBJECTIVE BOX
HPI:  This is a 55M with Phx of HTN, BPH, Gerd, alcoholic cirrhosis, ? chronic AF who was BIBA from [facility] rehab for dizziness, hypotension and near syncope.  He is a poor historian and the SNF records are sparse.  States [place] [facility] [place] after he went into alcohol withdrawal.  He does not have any recollection of his time [place] [facility]    He was sent to [facility] SN[facility] for physical rehab.    In [place] until this afternoon when he felt dizzy.  He states than he has not been eating or drinking much.    In ED he was found to have MELISSA with hypotension and junctional bradycardia.  Cultured, given fluids, abx and started on nor-epi for hypotension, noted with mild hyponatremia.  At rehab center he was on Digoxin, spironolactone, lasix and Nadolol 20 BID.  Stat echo reveals LVEF 65-70%    2021: Patient seen at bedside, no events noted overnight.  Patient remains bradycardic and on Levophed.  Nadolol and Digoxin have been stopped.  Patient is asymptomatic and denies any CP, palpitations, SOB, dizziness, lightheadedness.  TELEMETRY: AF with slow ventricular response 40-50s  EK21 Afib with SVR@41bpm  QRS: 100ms  QT/QTc: 554/457ms    MEDICATIONS  (STANDING):  cefTRIAXone Injectable. 1000 milliGRAM(s) IV Push every 24 hours  chlorhexidine 4% Liquid 1 Application(s) Topical <User Schedule>  folic acid 1 milliGRAM(s) Oral daily  heparin  Infusion.  Unit(s)/Hr (13 mL/Hr) IV Continuous <Continuous>  lactulose Syrup 10 Gram(s) Oral every 8 hours  norepinephrine Infusion 0.05 MICROgram(s)/kG/Min (7.31 mL/Hr) IV Continuous <Continuous>  pantoprazole    Tablet 40 milliGRAM(s) Oral before breakfast  rifAXIMin 550 milliGRAM(s) Oral two times a day  tamsulosin 0.4 milliGRAM(s) Oral at bedtime  thiamine 100 milliGRAM(s) Oral daily  tiotropium 18 MICROgram(s) Capsule 1 Capsule(s) Inhalation daily    MEDICATIONS  (PRN):  heparin   Injectable 6000 Unit(s) IV Push every 6 hours PRN For aPTT less than 40  heparin   Injectable 3000 Unit(s) IV Push every 6 hours PRN For aPTT between 40 - 57      Allergies    No Known Allergies    ICU Vital Signs Last 24 Hrs  T(C): 35.9 (14 Sep 2021 10:30), Max: 36.4 (14 Sep 2021 05:07)  T(F): 96.7 (14 Sep 2021 10:30), Max: 97.6 (14 Sep 2021 05:07)  HR: 43 (14 Sep 2021 10:00) (40 - 55)  BP: 99/68 (14 Sep 2021 10:00) (98/52 - 143/72)  BP(mean): 75 (14 Sep 2021 10:00) (63 - 88)  ABP: --  ABP(mean): --  RR: 15 (14 Sep 2021 10:00) (9 - 20)  SpO2: 98% (14 Sep 2021 07:59) (91% - 100%)      REVIEW OF SYSTEMS:    CONSTITUTIONAL:  As per HPI.  HEENT:  Eyes:  No diplopia or blurred vision. ENT:  No earache, sore throat or runny nose.  CARDIOVASCULAR:  No pressure, squeezing, strangling, tightness, heaviness or aching about the chest, neck, axilla or epigastrium.  RESPIRATORY:  No cough, shortness of breath, PND or orthopnea.  GASTROINTESTINAL:  No nausea, vomiting or diarrhea.  GENITOURINARY:  No dysuria, frequency or urgency.  MUSCULOSKELETAL:  As per HPI.  SKIN:  No change in skin, hair or nails.  NEUROLOGIC:  No paresthesias, fasciculations, seizures or weakness.  PSYCHIATRIC:  No disorder of thought or mood.  ENDOCRINE:  No heat or cold intolerance, polyuria or polydipsia.  HEMATOLOGICAL:  No easy bruising or bleedings:      PHYSICAL EXAMINATION:    GENERAL APPEARANCE:  Pt. is not currently dyspneic, in no distress. Pt. is A & O x 3, poor historian, slow to answer some questions.  HEENT:  Pupils are normal and react normally. No icterus. Mucous membranes well colored.  NECK:  Supple. No lymphadenopathy. Jugular venous pressure not elevated. Carotids equal.   HEART:   S1S2 irreg. There are no murmurs, rubs or gallops noted  CHEST:  Chest is clear to auscultation. Normal respiratory effort.  ABDOMEN:  Soft and nontender.   EXTREMITIES:  There is no edema.   SKIN:  No rash or significant lesions are noted.    I&O's Summary    12 Sep 2021 07:01  -  13 Sep 2021 07:00  --------------------------------------------------------  IN: 1450 mL / OUT: 3300 mL / NET: -1850 mL    13 Sep 2021 07:01  -  13 Sep 2021 10:01  --------------------------------------------------------  IN: 0 mL / OUT: 325 mL / NET: -325 mL        LABS:                 137  |  106  |  18  ----------------------------<  106<H>  3.3<L>   |  24  |  1.03    Ca    9.1      14 Sep 2021 08:28  Phos  2.1       Mg     2.4         TPro  6.5  /  Alb  3.0<L>  /  TBili  1.2  /  DBili  x   /  AST  21  /  ALT  8<L>  /  AlkPhos  53  -14                          10.3   9.18  )-----------( 143      ( 14 Sep 2021 08:28 )             30.8               CARDIAC TESTS:  < from: TTE Echo Complete w/o Contrast w/ Doppler (21 @ 19:06) >     Impression     Summary     Mild mitral annular calcification is present.   Fibrocalcific changes noted to the mitral valve leaflets with preserved   leaflet excursion.   Mild (1+) mitral regurgitation is present.   The aortic valve is trileaflet with thin pliable leaflets.   The tricuspid valve leaflets are thin and pliable; valve motion is normal.   Moderate (2+) tricuspid valve regurgitation is present.   Mild pulmonary hypertension.   The left atrium is severely dilated.   The left ventricle is normal in size, wall thickness, wall motion and   contractility.   Estimated left ventricular ejection fraction is 65-70 %.   The right atrium appears severely dilated.   Normal appearing right ventricle structure and function.   The IVC appears normal.              RADIOLOGY & ADDITIONAL STUDIES:

## 2021-09-14 NOTE — DIETITIAN NUTRITION RISK NOTIFICATION - ADDITIONAL COMMENTS/DIETITIAN RECOMMENDATIONS
· Additional Recommendations  1) continue with regular diet and encourage protein enriched foods with all meals. 2) add to CIWA protocol meds MVI w/ minerals to meet RDI's 3) monitor daily weights 4) monitor lytes and hydration replete as needed. 5) monitor BM if none x > 3 days increase bowel meds

## 2021-09-14 NOTE — PROGRESS NOTE ADULT - SUBJECTIVE AND OBJECTIVE BOX
Patient is a 55y old  Male who presents with a chief complaint of Near syncope (14 Sep 2021 13:23)    HPI:  55M hx HTN BPH Gerd chronic a fib ??? on pradaxa.  GERD alcoholic cirrhosis     First time in [place].  His hx is not really reliable and the SNF records are sparse.  States he was at [facility] in [place] for a few weeks after he went into alcohol withdrawal.  He does not have any recollection of his time at [facility].      He was sent to [facility] SNF for physical rehab.      In [facility] until this afternoon when her felt dizzy.  He states than he has not been eating or drinking much.    Admit with MELISSA hypotension and junctional nena cardia.  Cultured given fluids abx and started on nor-epi  noted with mild hyponatremia  He is on a bunch of diuretics nadolol and digoxin.  ,          (12 Sep 2021 23:06)    Allergies: No Known Allergies    PAST MEDICAL & SURGICAL HISTORY:  Afib    GERD (gastroesophageal reflux disease)    MELISSA (acute kidney injury)    Anemia    Hyponatremia    Hypotension    Alcoholic cirrhosis    Atelectasis      FAMILY HISTORY:    SOCIAL HISTORY:    Home Medications:    Review of Systems:  Constitutional: no fever, chills, fatigue  Neuro: no headache, numbness, weakness  Resp: no cough, wheezing, shortness of breath  CVS: no chest pain, palpitations, leg swelling  GI: no abdominal pain, nausea, vomiting, diarrhea   : no dysuria, frequency, incontinence  Skin: no itching, burning, rashes, or lesions   Msk: no joint pain or swelling  Psych: no depression, anxiety, mood swings    T(F): 97.4 (21 @ 22:30), Max: 97.6 (21 @ 05:07)  HR: 70 (21 @ 22:00) (40 - 70)  BP: 120/78 (21 @ 22:00) (96/61 - 128/64)  RR: 21 (21 @ 22:00) (13 - 21)  SpO2: 98% (21 @ 07:59)  Wt(kg): --    CAPILLARY BLOOD GLUCOSE        I&O's Summary    13 Sep 2021 07:01  -  14 Sep 2021 07:00  --------------------------------------------------------  IN: 2566 mL / OUT: 1965 mL / NET: 601 mL    14 Sep 2021 07:01  -  14 Sep 2021 23:06  --------------------------------------------------------  IN: 1148 mL / OUT: 700 mL / NET: 448 mL        Physical Exam:     Gen:  Neuro:  HEENT:  CVS:  Resp:  Abd:  Ext:  Skin:    Meds:  cefTRIAXone Injectable. 1000 milliGRAM(s) IV Push every 24 hours  rifAXIMin 550 milliGRAM(s) Oral two times a day    norepinephrine Infusion 0.05 MICROgram(s)/kG/Min IV Continuous <Continuous>  tamsulosin 0.4 milliGRAM(s) Oral at bedtime        tiotropium 18 MICROgram(s) Capsule 1 Capsule(s) Inhalation daily           heparin   Injectable 6000 Unit(s) IV Push every 6 hours PRN  heparin   Injectable 3000 Unit(s) IV Push every 6 hours PRN  heparin  Infusion.  Unit(s)/Hr IV Continuous <Continuous>     lactulose Syrup 10 Gram(s) Oral every 8 hours  pantoprazole    Tablet 40 milliGRAM(s) Oral before breakfast        folic acid 1 milliGRAM(s) Oral daily  thiamine 100 milliGRAM(s) Oral daily        chlorhexidine 4% Liquid 1 Application(s) Topical <User Schedule>                              10.3   9.18  )-----------( 143      ( 14 Sep 2021 08:28 )             30.8           137  |  106  |  18  ----------------------------<  106<H>  3.3<L>   |  24  |  1.03    Ca    9.1      14 Sep 2021 08:28  Phos  2.1       Mg     2.4         TPro  6.5  /  Alb  3.0<L>  /  TBili  1.2  /  DBili  x   /  AST  21  /  ALT  8<L>  /  AlkPhos  53            PT/INR - ( 14 Sep 2021 08:28 )   PT: 17.2 sec;   INR: 1.50 ratio         PTT - ( 14 Sep 2021 16:35 )  PTT:77.6 sec  Urinalysis Basic - ( 13 Sep 2021 06:00 )    Color: Yellow / Appearance: Clear / S.005 / pH: x  Gluc: x / Ketone: Negative  / Bili: Negative / Urobili: Negative mg/dL   Blood: x / Protein: Negative mg/dL / Nitrite: Negative   Leuk Esterase: Negative / RBC: x / WBC x   Sq Epi: x / Non Sq Epi: x / Bacteria: x      .Blood None   No growth to date. --  @ 19:43  .Blood Blood-Peripheral   No growth to date. --  @ 16:07          Radiology: ***    Bedside lung ultrasound: ***    Bedside ECHO: ***    CODE STATUS: ***  [facility] discussion: Y  Critical care time spent (mins): *** Patient is a 55y old  Male who presents with a chief complaint of Near syncope (14 Sep 2021 13:23)    HPI:  55M hx HTN BPH Gerd chronic a fib on pradaxa.  GERD alcoholic cirrhosis presented to ED from rehab with near syncope. Patient noted to be hypotensive and started on vasporessors. Patient hospital course further complicated by hyponatremia and bradycardia    At bedside patient remains on Levophed gtt to support blood pressure. Admits to some abdominal discomfort tonight after eating    Allergies: No Known Allergies    PAST MEDICAL & SURGICAL HISTORY:  Afib    GERD (gastroesophageal reflux disease)    MELISSA (acute kidney injury)    Anemia    Hyponatremia    Hypotension    Alcoholic cirrhosis    Atelectasis      FAMILY HISTORY:    SOCIAL HISTORY:    Home Medications:    Review of Systems:  ROS as noted above. All other ROS negative    T(F): 97.4 (21 @ 22:30), Max: 97.6 (21 @ 05:07)  HR: 70 (21 @ 22:00) (40 - 70)  BP: 120/78 (21 @ 22:00) (96/61 - 128/64)  RR: 21 (21 @ 22:00) (13 - 21)  SpO2: 98% (21 @ 07:59)  Wt(kg): --    CAPILLARY BLOOD GLUCOSE        I&O's Summary    13 Sep 2021 07:  -  14 Sep 2021 07:00  --------------------------------------------------------  IN: 2566 mL / OUT: 1965 mL / NET: 601 mL    14 Sep 2021 07:  -  14 Sep 2021 23:06  --------------------------------------------------------  IN: 1148 mL / OUT: 700 mL / NET: 448 mL        Physical Exam:     Gen: ill appearing, jaundice  Neuro: awake/alert  CVS: +S1S2  Resp: CTA  Abd: soft,  NT  Ext: warm, dry no edema  Skin: jaundice    Meds:  cefTRIAXone Injectable. 1000 milliGRAM(s) IV Push every 24 hours  rifAXIMin 550 milliGRAM(s) Oral two times a day    norepinephrine Infusion 0.05 MICROgram(s)/kG/Min IV Continuous <Continuous>  tamsulosin 0.4 milliGRAM(s) Oral at bedtime        tiotropium 18 MICROgram(s) Capsule 1 Capsule(s) Inhalation daily           heparin   Injectable 6000 Unit(s) IV Push every 6 hours PRN  heparin   Injectable 3000 Unit(s) IV Push every 6 hours PRN  heparin  Infusion.  Unit(s)/Hr IV Continuous <Continuous>     lactulose Syrup 10 Gram(s) Oral every 8 hours  pantoprazole    Tablet 40 milliGRAM(s) Oral before breakfast        folic acid 1 milliGRAM(s) Oral daily  thiamine 100 milliGRAM(s) Oral daily        chlorhexidine 4% Liquid 1 Application(s) Topical <User Schedule>                              10.3   9.18  )-----------( 143      ( 14 Sep 2021 08:28 )             30.8           137  |  106  |  18  ----------------------------<  106<H>  3.3<L>   |  24  |  1.03    Ca    9.1      14 Sep 2021 08:28  Phos  2.1       Mg     2.4         TPro  6.5  /  Alb  3.0<L>  /  TBili  1.2  /  DBili  x   /  AST  21  /  ALT  8<L>  /  AlkPhos  53            PT/INR - ( 14 Sep 2021 08:28 )   PT: 17.2 sec;   INR: 1.50 ratio         PTT - ( 14 Sep 2021 16:35 )  PTT:77.6 sec  Urinalysis Basic - ( 13 Sep 2021 06:00 )    Color: Yellow / Appearance: Clear / S.005 / pH: x  Gluc: x / Ketone: Negative  / Bili: Negative / Urobili: Negative mg/dL   Blood: x / Protein: Negative mg/dL / Nitrite: Negative   Leuk Esterase: Negative / RBC: x / WBC x   Sq Epi: x / Non Sq Epi: x / Bacteria: x      .Blood None   No growth to date. --  @ 19:43  .Blood Blood-Peripheral   No growth to date. --  @ 16:07          Radiology:     EXAM:  XR CHEST PORTABLE URGENT 1V                            PROCEDURE DATE:  2021          INTERPRETATION:  Chest one view    HISTORY: Syncope    COMPARISON STUDY: 2021    Frontal expiratory view of the chest shows the heart to be similar in size. The lungs are clear and there is no evidence of pneumothorax nor pleural effusion.    IMPRESSION:  No active pulmonary disease.    Thank you for the courtesy of this referral.    --- End of Report ---            TYRA DOYLE MD; Attending Interventional Radiologist  This document has been electronically signed. Sep 13 2021  4:01PM        -Shock  -Brdycardia  -Alcoholic Cirrhosis      Assessment/Plan:  54 yo M pmhx Alcoholic Cirrhosis, Anemia, MELISSA, Afib on Pradaxa and GERD presented with near syncope. Admit with bradycardia and shock    -Levophed gtt for shock state. Actively titrating for MAP >65. Pressor requirement improving  -Holding Midodrine given bradycardia  -Bradycardia w/ HR in 40-50's; holding Nadolol and Digoxin. s/p Digibind. Keep K >4 and Mg >2. Potassium and Phosphorus supplemented earlier. Keep K >4 and Mg >2. Potassium supplemented earlier  -Alcoholic Cirrhosis; Ammonia elevated. Rifaximin daily  -Empiric abx coverage w/ Ceftriaxone. Cultures sent and pending  -Full AC w/ Heparin gtt for Afib Patient is a 55y old  Male who presents with a chief complaint of Near syncope (14 Sep 2021 13:23)    HPI:  55M hx HTN BPH Gerd chronic a fib on pradaxa.  GERD alcoholic cirrhosis presented to ED from rehab with near syncope. Patient noted to be hypotensive and started on vasporessors. Patient hospital course further complicated by hyponatremia and bradycardia    At bedside patient remains on Levophed gtt to support blood pressure. Admits to some abdominal discomfort tonight after eating    Allergies: No Known Allergies    PAST MEDICAL & SURGICAL HISTORY:  Afib    GERD (gastroesophageal reflux disease)    MELISSA (acute kidney injury)    Anemia    Hyponatremia    Hypotension    Alcoholic cirrhosis    Atelectasis      FAMILY HISTORY:    SOCIAL HISTORY:    Home Medications:    Review of Systems:  ROS as noted above. All other ROS negative    T(F): 97.4 (21 @ 22:30), Max: 97.6 (21 @ 05:07)  HR: 70 (21 @ 22:00) (40 - 70)  BP: 120/78 (21 @ 22:00) (96/61 - 128/64)  RR: 21 (21 @ 22:00) (13 - 21)  SpO2: 98% (21 @ 07:59)  Wt(kg): --    CAPILLARY BLOOD GLUCOSE        I&O's Summary    13 Sep 2021 07:  -  14 Sep 2021 07:00  --------------------------------------------------------  IN: 2566 mL / OUT: 1965 mL / NET: 601 mL    14 Sep 2021 07:  -  14 Sep 2021 23:06  --------------------------------------------------------  IN: 1148 mL / OUT: 700 mL / NET: 448 mL        Physical Exam:     Gen: ill appearing, jaundice  Neuro: awake/alert  CVS: +S1S2  Resp: CTA  Abd: soft,  NT  Ext: warm, dry no edema  Skin: jaundice    Meds:  cefTRIAXone Injectable. 1000 milliGRAM(s) IV Push every 24 hours  rifAXIMin 550 milliGRAM(s) Oral two times a day    norepinephrine Infusion 0.05 MICROgram(s)/kG/Min IV Continuous <Continuous>  tamsulosin 0.4 milliGRAM(s) Oral at bedtime        tiotropium 18 MICROgram(s) Capsule 1 Capsule(s) Inhalation daily           heparin   Injectable 6000 Unit(s) IV Push every 6 hours PRN  heparin   Injectable 3000 Unit(s) IV Push every 6 hours PRN  heparin  Infusion.  Unit(s)/Hr IV Continuous <Continuous>     lactulose Syrup 10 Gram(s) Oral every 8 hours  pantoprazole    Tablet 40 milliGRAM(s) Oral before breakfast        folic acid 1 milliGRAM(s) Oral daily  thiamine 100 milliGRAM(s) Oral daily        chlorhexidine 4% Liquid 1 Application(s) Topical <User Schedule>                              10.3   9.18  )-----------( 143      ( 14 Sep 2021 08:28 )             30.8           137  |  106  |  18  ----------------------------<  106<H>  3.3<L>   |  24  |  1.03    Ca    9.1      14 Sep 2021 08:28  Phos  2.1       Mg     2.4         TPro  6.5  /  Alb  3.0<L>  /  TBili  1.2  /  DBili  x   /  AST  21  /  ALT  8<L>  /  AlkPhos  53            PT/INR - ( 14 Sep 2021 08:28 )   PT: 17.2 sec;   INR: 1.50 ratio         PTT - ( 14 Sep 2021 16:35 )  PTT:77.6 sec  Urinalysis Basic - ( 13 Sep 2021 06:00 )    Color: Yellow / Appearance: Clear / S.005 / pH: x  Gluc: x / Ketone: Negative  / Bili: Negative / Urobili: Negative mg/dL   Blood: x / Protein: Negative mg/dL / Nitrite: Negative   Leuk Esterase: Negative / RBC: x / WBC x   Sq Epi: x / Non Sq Epi: x / Bacteria: x      .Blood None   No growth to date. --  @ 19:43  .Blood Blood-Peripheral   No growth to date. --  @ 16:07          Radiology:     EXAM:  XR CHEST PORTABLE URGENT 1V                            PROCEDURE DATE:  2021          INTERPRETATION:  Chest one view    HISTORY: Syncope    COMPARISON STUDY: 2021    Frontal expiratory view of the chest shows the heart to be similar in size. The lungs are clear and there is no evidence of pneumothorax nor pleural effusion.    IMPRESSION:  No active pulmonary disease.    Thank you for the courtesy of this referral.    --- End of Report ---            TYRA DOYLE MD; Attending Interventional Radiologist  This document has been electronically signed. Sep 13 2021  4:01PM        -Shock  -Brdycardia  -Alcoholic Cirrhosis      Assessment/Plan:  54 yo M pmhx Alcoholic Cirrhosis, Anemia, MELISSA, Afib on Pradaxa and GERD presented with near syncope. Admit with bradycardia and shock    -Levophed gtt for shock state. Actively titrating for MAP >65.  -Holding Midodrine given bradycardia  -Bradycardia w/ HR in 40-50's; holding Nadolol and Digoxin. s/p Digibind. Keep K >4 and Mg >2. Potassium and Phosphorus supplemented earlier. Keep K >4 and Mg >2. Potassium supplemented earlier  -Alcoholic Cirrhosis; Ammonia elevated. Rifaximin daily  -Empiric abx coverage w/ Ceftriaxone. Cultures sent and pending  -Full AC w/ Heparin gtt for Afib    Critical care time: 39 minutes including time spent reviewing chart, ordering tests/labs, discussing with interdisciplinary team. Not including time spent performing procedures

## 2021-09-14 NOTE — DIETITIAN INITIAL EVALUATION ADULT. - PHYSCIAL ASSESSMENT
Vlad scale- 20 (low risk for skin breakdown)  Skin: healing right heel pressure ulcer from previous admission

## 2021-09-15 LAB
ALBUMIN SERPL ELPH-MCNC: 2.6 G/DL — LOW (ref 3.3–5)
ALP SERPL-CCNC: 51 U/L — SIGNIFICANT CHANGE UP (ref 40–120)
ALT FLD-CCNC: 9 U/L — LOW (ref 12–78)
ANION GAP SERPL CALC-SCNC: 6 MMOL/L — SIGNIFICANT CHANGE UP (ref 5–17)
APTT BLD: 74 SEC — HIGH (ref 27.5–35.5)
AST SERPL-CCNC: 20 U/L — SIGNIFICANT CHANGE UP (ref 15–37)
BASOPHILS # BLD AUTO: 0.05 K/UL — SIGNIFICANT CHANGE UP (ref 0–0.2)
BASOPHILS NFR BLD AUTO: 0.7 % — SIGNIFICANT CHANGE UP (ref 0–2)
BILIRUB SERPL-MCNC: 0.9 MG/DL — SIGNIFICANT CHANGE UP (ref 0.2–1.2)
BUN SERPL-MCNC: 13 MG/DL — SIGNIFICANT CHANGE UP (ref 7–23)
CALCIUM SERPL-MCNC: 8.9 MG/DL — SIGNIFICANT CHANGE UP (ref 8.5–10.1)
CHLORIDE SERPL-SCNC: 108 MMOL/L — SIGNIFICANT CHANGE UP (ref 96–108)
CO2 SERPL-SCNC: 24 MMOL/L — SIGNIFICANT CHANGE UP (ref 22–31)
CREAT SERPL-MCNC: 0.87 MG/DL — SIGNIFICANT CHANGE UP (ref 0.5–1.3)
EOSINOPHIL # BLD AUTO: 0.21 K/UL — SIGNIFICANT CHANGE UP (ref 0–0.5)
EOSINOPHIL NFR BLD AUTO: 2.9 % — SIGNIFICANT CHANGE UP (ref 0–6)
GLUCOSE SERPL-MCNC: 78 MG/DL — SIGNIFICANT CHANGE UP (ref 70–99)
HCT VFR BLD CALC: 29.4 % — LOW (ref 39–50)
HGB BLD-MCNC: 9.8 G/DL — LOW (ref 13–17)
IMM GRANULOCYTES NFR BLD AUTO: 0.3 % — SIGNIFICANT CHANGE UP (ref 0–1.5)
LYMPHOCYTES # BLD AUTO: 1.83 K/UL — SIGNIFICANT CHANGE UP (ref 1–3.3)
LYMPHOCYTES # BLD AUTO: 25.1 % — SIGNIFICANT CHANGE UP (ref 13–44)
MAGNESIUM SERPL-MCNC: 2.2 MG/DL — SIGNIFICANT CHANGE UP (ref 1.6–2.6)
MCHC RBC-ENTMCNC: 30.5 PG — SIGNIFICANT CHANGE UP (ref 27–34)
MCHC RBC-ENTMCNC: 33.3 GM/DL — SIGNIFICANT CHANGE UP (ref 32–36)
MCV RBC AUTO: 91.6 FL — SIGNIFICANT CHANGE UP (ref 80–100)
MONOCYTES # BLD AUTO: 0.54 K/UL — SIGNIFICANT CHANGE UP (ref 0–0.9)
MONOCYTES NFR BLD AUTO: 7.4 % — SIGNIFICANT CHANGE UP (ref 2–14)
NEUTROPHILS # BLD AUTO: 4.65 K/UL — SIGNIFICANT CHANGE UP (ref 1.8–7.4)
NEUTROPHILS NFR BLD AUTO: 63.6 % — SIGNIFICANT CHANGE UP (ref 43–77)
PHOSPHATE SERPL-MCNC: 2.8 MG/DL — SIGNIFICANT CHANGE UP (ref 2.5–4.5)
PLATELET # BLD AUTO: 125 K/UL — LOW (ref 150–400)
POTASSIUM SERPL-MCNC: 3.9 MMOL/L — SIGNIFICANT CHANGE UP (ref 3.5–5.3)
POTASSIUM SERPL-SCNC: 3.9 MMOL/L — SIGNIFICANT CHANGE UP (ref 3.5–5.3)
PROT SERPL-MCNC: 5.9 GM/DL — LOW (ref 6–8.3)
RBC # BLD: 3.21 M/UL — LOW (ref 4.2–5.8)
RBC # FLD: 14.5 % — SIGNIFICANT CHANGE UP (ref 10.3–14.5)
SODIUM SERPL-SCNC: 138 MMOL/L — SIGNIFICANT CHANGE UP (ref 135–145)
WBC # BLD: 7.3 K/UL — SIGNIFICANT CHANGE UP (ref 3.8–10.5)
WBC # FLD AUTO: 7.3 K/UL — SIGNIFICANT CHANGE UP (ref 3.8–10.5)

## 2021-09-15 PROCEDURE — 99291 CRITICAL CARE FIRST HOUR: CPT

## 2021-09-15 RX ORDER — MIDODRINE HYDROCHLORIDE 2.5 MG/1
10 TABLET ORAL EVERY 8 HOURS
Refills: 0 | Status: DISCONTINUED | OUTPATIENT
Start: 2021-09-15 | End: 2021-09-17

## 2021-09-15 RX ADMIN — HEPARIN SODIUM 900 UNIT(S)/HR: 5000 INJECTION INTRAVENOUS; SUBCUTANEOUS at 00:37

## 2021-09-15 RX ADMIN — MIDODRINE HYDROCHLORIDE 10 MILLIGRAM(S): 2.5 TABLET ORAL at 21:08

## 2021-09-15 RX ADMIN — LACTULOSE 10 GRAM(S): 10 SOLUTION ORAL at 13:53

## 2021-09-15 RX ADMIN — TAMSULOSIN HYDROCHLORIDE 0.4 MILLIGRAM(S): 0.4 CAPSULE ORAL at 21:08

## 2021-09-15 RX ADMIN — Medication 1 MILLIGRAM(S): at 11:32

## 2021-09-15 RX ADMIN — Medication 100 MILLIGRAM(S): at 11:32

## 2021-09-15 RX ADMIN — Medication 7.31 MICROGRAM(S)/KG/MIN: at 10:52

## 2021-09-15 RX ADMIN — TIOTROPIUM BROMIDE 1 CAPSULE(S): 18 CAPSULE ORAL; RESPIRATORY (INHALATION) at 10:14

## 2021-09-15 RX ADMIN — LACTULOSE 10 GRAM(S): 10 SOLUTION ORAL at 21:08

## 2021-09-15 RX ADMIN — LACTULOSE 10 GRAM(S): 10 SOLUTION ORAL at 06:42

## 2021-09-15 RX ADMIN — CEFTRIAXONE 1000 MILLIGRAM(S): 500 INJECTION, POWDER, FOR SOLUTION INTRAMUSCULAR; INTRAVENOUS at 17:31

## 2021-09-15 RX ADMIN — PANTOPRAZOLE SODIUM 40 MILLIGRAM(S): 20 TABLET, DELAYED RELEASE ORAL at 11:32

## 2021-09-15 NOTE — PROGRESS NOTE ADULT - SUBJECTIVE AND OBJECTIVE BOX
Patient is a 55y old  Male who presents with a chief complaint of Near syncope (15 Sep 2021 08:26)    24 hour events: ***    PAST MEDICAL & SURGICAL HISTORY:  Afib    GERD (gastroesophageal reflux disease)    MELISSA (acute kidney injury)    Anemia    Hyponatremia    Hypotension    Alcoholic cirrhosis    Atelectasis      REVIEW OF SYSTEMS  Constitutional: No fever, chills, fatigue  Neuro: No headache, numbness, weakness  Resp: No cough, wheezing, shortness of breath  CVS: No chest pain, palpitations, leg swelling  GI: No abdominal pain, nausea, vomiting, diarrhea   : No dysuria, frequency, incontinence  Skin: No itching, burning, rashes, or lesions   Msk: No joint pain or swelling  Psych: No depression, anxiety, mood swings  Heme: No bleeding    T(F): 97.2 (09-15-21 @ 07:50), Max: 97.4 (09-14-21 @ 22:30)  HR: 64 (09-15-21 @ 15:00) (52 - 112)  BP: 87/54 (09-15-21 @ 15:00) (84/46 - 128/64)  RR: 17 (09-15-21 @ 15:00) (15 - 39)  SpO2: 97% (09-15-21 @ 10:15) (97% - 97%)  Wt(kg): --      CAPILLARY BLOOD GLUCOSE    I&O's Summary    09-14 @ 07:01  -  09-15 @ 07:00  --------------------------------------------------------  IN: 1383 mL / OUT: 1720 mL / NET: -337 mL    09-15 @ 07:01  -  09-15 @ 16:25  --------------------------------------------------------  IN: 0 mL / OUT: 400 mL / NET: -400 mL    PHYSICAL EXAM  General:   CNS:   HEENT:   Resp:   CVS:   Abd:   Ext:   Skin:     MEDICATIONS  cefTRIAXone Injectable. IV Push  rifAXIMin Oral  norepinephrine Infusion IV Continuous  tamsulosin Oral  tiotropium 18 MICROgram(s) Capsule Inhalation  heparin   Injectable IV Push PRN  heparin   Injectable IV Push PRN  heparin  Infusion. IV Continuous  lactulose Syrup Oral  pantoprazole    Tablet Oral  folic acid Oral  thiamine Oral  chlorhexidine 4% Liquid Topical                          9.8    7.30  )-----------( 125      ( 15 Sep 2021 06:40 )             29.4       09-15    138  |  108  |  13  ----------------------------<  78  3.9   |  24  |  0.87    Ca    8.9      15 Sep 2021 06:40  Phos  2.8     09-15  Mg     2.2     09-15    TPro  5.9<L>  /  Alb  2.6<L>  /  TBili  0.9  /  DBili  x   /  AST  20  /  ALT  9<L>  /  AlkPhos  51  09-15          PT/INR - ( 14 Sep 2021 08:28 )   PT: 17.2 sec;   INR: 1.50 ratio         PTT - ( 15 Sep 2021 00:08 )  PTT:74.0 sec    .Blood None   No growth to date. -- 09-12 @ 19:43  .Blood Blood-Peripheral   No growth to date. -- 09-12 @ 16:07       Patient is a 55y old  Male who presents with a chief complaint of Near syncope (15 Sep 2021 08:26)    24 hour events: on very low dose levo     PAST MEDICAL & SURGICAL HISTORY:  Afib    GERD (gastroesophageal reflux disease)    MELISSA (acute kidney injury)    Anemia    Hyponatremia    Hypotension    Alcoholic cirrhosis    Atelectasis      REVIEW OF SYSTEMS  Constitutional: No fever, chills, fatigue  Neuro: No headache, numbness, weakness  Resp: No cough, wheezing, shortness of breath  CVS: No chest pain, palpitations, leg swelling  GI: No abdominal pain, nausea, vomiting, diarrhea   : No dysuria, frequency, incontinence  Skin: No itching, burning, rashes, or lesions   Msk: No joint pain or swelling  Psych: No depression, anxiety, mood swings  Heme: No bleeding    T(F): 97.2 (09-15-21 @ 07:50), Max: 97.4 (09-14-21 @ 22:30)  HR: 64 (09-15-21 @ 15:00) (52 - 112)  BP: 87/54 (09-15-21 @ 15:00) (84/46 - 128/64)  RR: 17 (09-15-21 @ 15:00) (15 - 39)  SpO2: 97% (09-15-21 @ 10:15) (97% - 97%)  Wt(kg): --      CAPILLARY BLOOD GLUCOSE    I&O's Summary    09-14 @ 07:01  -  09-15 @ 07:00  --------------------------------------------------------  IN: 1383 mL / OUT: 1720 mL / NET: -337 mL    09-15 @ 07:01  -  09-15 @ 16:25  --------------------------------------------------------  IN: 0 mL / OUT: 400 mL / NET: -400 mL    PHYSICAL EXAM  General: middle aged male, NAD  CNS: AAOx3, no focal deficits   HEENT: PERRL, +icteric  Resp: good AE b/l, clear   CVS: S1S2, regular, nena  Abd: soft, distended, NT, +BS  Ext: no edema   Skin: warm, icteric     MEDICATIONS  cefTRIAXone Injectable. IV Push  rifAXIMin Oral  norepinephrine Infusion IV Continuous  tamsulosin Oral  tiotropium 18 MICROgram(s) Capsule Inhalation  heparin   Injectable IV Push PRN  heparin   Injectable IV Push PRN  heparin  Infusion. IV Continuous  lactulose Syrup Oral  pantoprazole    Tablet Oral  folic acid Oral  thiamine Oral  chlorhexidine 4% Liquid Topical                          9.8    7.30  )-----------( 125      ( 15 Sep 2021 06:40 )             29.4       09-15    138  |  108  |  13  ----------------------------<  78  3.9   |  24  |  0.87    Ca    8.9      15 Sep 2021 06:40  Phos  2.8     09-15  Mg     2.2     09-15    TPro  5.9<L>  /  Alb  2.6<L>  /  TBili  0.9  /  DBili  x   /  AST  20  /  ALT  9<L>  /  AlkPhos  51  09-15          PT/INR - ( 14 Sep 2021 08:28 )   PT: 17.2 sec;   INR: 1.50 ratio         PTT - ( 15 Sep 2021 00:08 )  PTT:74.0 sec    .Blood None   No growth to date. -- 09-12 @ 19:43  .Blood Blood-Peripheral   No growth to date. -- 09-12 @ 16:07

## 2021-09-15 NOTE — PROGRESS NOTE ADULT - ASSESSMENT
55 yr old male with above history BIBA from [facility] rehab for dizziness, hypotension and near syncope.  Pt states that he does not eat the food there and family brings him meals, he hasn't been eating much.  He was dizzy when he stood up and was found to be hypotensive.  He is a poor historian with details.  Nadolol, digoxin, diuretics on hold.  He is on pressors and IV fluids.  Digibind was ordered by intensivist this morning, Dig level 1.07. unknown why not on OAC with history of AF.      A/P:  Near syncope, bradycardia, hypotension.  Continue tele monitoring and CCU care  Wean pressors if BP permits.  Continue to hold Nadolol, Digoxin, diuretics.  Continue IV Heparin for anticoagulation.  Keep K>4.0, Mg>2.0  LVEF 65-70%, +1MR, +2TR  Pacing not indicated at this time  Will continue to follow

## 2021-09-15 NOTE — PROGRESS NOTE ADULT - SUBJECTIVE AND OBJECTIVE BOX
HPI:  This is a 55M with Phx of HTN, BPH, Gerd, alcoholic cirrhosis, ? chronic AF who was BIBA from [facility] rehab for dizziness, hypotension and near syncope.  He is a poor historian and the SNF records are sparse.  States he was at [facility] in July for a few weeks after he went into alcohol withdrawal.  He does not have any recollection of his time at [facility].    He was sent to [facility] for physical rehab.    In [facility] until this afternoon when he felt dizzy.  He states than he has not been eating or drinking much.    In ED he was found to have MELISSA with hypotension and junctional bradycardia.  Cultured, given fluids, abx and started on nor-epi for hypotension, noted with mild hyponatremia.  At rehab center he was on Digoxin, spironolactone, lasix and Nadolol 20 BID.  Stat echo reveals LVEF 65-70%    21: EP consulted for bradycardia, junctional rhythm 30's.   Digoxin and Nadolol are discontinued.  He is on Nor-epi drip and IVF.  He is awake and alert, asymptomatic at present, denies CP, SOB, dizziness, palpitations or lightheadedness    2021: Patient seen at bedside, no events noted overnight.  Patient remains bradycardic and on Levophed.  Nadolol and Digoxin have been stopped.  Patient is asymptomatic and denies any CP, palpitations, SOB, dizziness, lightheadedness.  TELEMETRY: AF with slow ventricular response 40-50s  EK21 Afib with SVR@41bpm  QRS: 100ms  QT/QTc: 554/457ms    9/15/21: pt resting in bed in NAD, remains on Levophed and IV Heparin was added for AFib   TELE: Atrial fib rate 40-60, overall HR better, no significant pauses seen      MEDICATIONS  (STANDING):  cefTRIAXone Injectable. 1000 milliGRAM(s) IV Push every 24 hours  chlorhexidine 4% Liquid 1 Application(s) Topical <User Schedule>  folic acid 1 milliGRAM(s) Oral daily  heparin  Infusion.  Unit(s)/Hr (13 mL/Hr) IV Continuous <Continuous>  lactulose Syrup 10 Gram(s) Oral every 8 hours  norepinephrine Infusion 0.05 MICROgram(s)/kG/Min (7.31 mL/Hr) IV Continuous <Continuous>  pantoprazole    Tablet 40 milliGRAM(s) Oral before breakfast  rifAXIMin 550 milliGRAM(s) Oral two times a day  tamsulosin 0.4 milliGRAM(s) Oral at bedtime  thiamine 100 milliGRAM(s) Oral daily  tiotropium 18 MICROgram(s) Capsule 1 Capsule(s) Inhalation daily    MEDICATIONS  (PRN):  heparin   Injectable 6000 Unit(s) IV Push every 6 hours PRN For aPTT less than 40  heparin   Injectable 3000 Unit(s) IV Push every 6 hours PRN For aPTT between 40 - 57      Allergies    No Known Allergies      Vital Signs Last 24 Hrs  T(C): 36.2 (15 Sep 2021 07:50), Max: 36.3 (14 Sep 2021 22:30)  T(F): 97.2 (15 Sep 2021 07:50), Max: 97.4 (14 Sep 2021 22:30)  HR: 64 (15 Sep 2021 05:00) (43 - 112)  BP: 99/57 (15 Sep 2021 05:00) (84/46 - 128/64)  BP(mean): 68 (15 Sep 2021 05:00) (54 - 92)  RR: 18 (15 Sep 2021 05:00) (14 - 39)  SpO2: --                          9.8    7.30  )-----------( 125      ( 15 Sep 2021 06:40 )             29.4     09-15    138  |  108  |  13  ----------------------------<  78  3.9   |  24  |  0.87    Ca    8.9      15 Sep 2021 06:40  Phos  2.8     09-15  Mg     2.2     09-15    TPro  5.9<L>  /  Alb  2.6<L>  /  TBili  0.9  /  DBili  x   /  AST  20  /  ALT  9<L>  /  AlkPhos  51  09-15      REVIEW OF SYSTEMS:    CONSTITUTIONAL:  As per HPI.  HEENT:  Eyes:  No diplopia or blurred vision. ENT:  No earache, sore throat or runny nose.  CARDIOVASCULAR:  No pressure, squeezing, strangling, tightness, heaviness or aching about the chest, neck, axilla or epigastrium.  RESPIRATORY:  No cough, shortness of breath, PND or orthopnea.  GASTROINTESTINAL:  No nausea, vomiting or diarrhea.  GENITOURINARY:  No dysuria, frequency or urgency.  MUSCULOSKELETAL:  As per HPI.  SKIN:  No change in skin, hair or nails.  NEUROLOGIC:  No paresthesias, fasciculations, seizures or weakness.  PSYCHIATRIC:  No disorder of thought or mood.  ENDOCRINE:  No heat or cold intolerance, polyuria or polydipsia.  HEMATOLOGICAL:  No easy bruising or bleedings:      PHYSICAL EXAMINATION:    GENERAL APPEARANCE:  Pt. is not currently dyspneic, in no distress. Pt. is A & O x 3, poor historian  HEENT:  Pupils are normal and react normally. No icterus. Mucous membranes well colored.  NECK:  Supple. No lymphadenopathy. Jugular venous pressure not elevated. Carotids equal.   HEART:   S1S2 irreg. There are no murmurs, rubs or gallops noted  CHEST:  Chest is clear to auscultation. Normal respiratory effort.  ABDOMEN:  Soft and nontender.   EXTREMITIES:  There is no edema.   SKIN:  No rash or significant lesions are noted.      CARDIAC TESTS:  < from: TTE Echo Complete w/o Contrast w/ Doppler (21 @ 19:06) >     Impression     Summary     Mild mitral annular calcification is present.   Fibrocalcific changes noted to the mitral valve leaflets with preserved   leaflet excursion.   Mild (1+) mitral regurgitation is present.   The aortic valve is trileaflet with thin pliable leaflets.   The tricuspid valve leaflets are thin and pliable; valve motion is normal.   Moderate (2+) tricuspid valve regurgitation is present.   Mild pulmonary hypertension.   The left atrium is severely dilated.   The left ventricle is normal in size, wall thickness, wall motion and   contractility.   Estimated left ventricular ejection fraction is 65-70 %.   The right atrium appears severely dilated.   Normal appearing right ventricle structure and function.   The IVC appears normal.

## 2021-09-15 NOTE — PROGRESS NOTE ADULT - SUBJECTIVE AND OBJECTIVE BOX
Patient is a 55y old  Male who presents with a chief complaint of Near syncope (15 Sep 2021 16:24)      BRIEF HOSPITAL COURSE: 56 y/o M with a h/o HTN, BPH, GERD, chronic a fib on pradaxa, alcoholic cirrhosis, admitted on 9/12 from rehab with near syncope. Patient noted to be hypotensive and ultimately required IV vasopressor therapy. Patient hospital course further complicated by hyponatremia and bradycardia.    Events last 24 hours: Patient remains in IV vasopressor dependent shock state. Still without clear infectious source. Overall net neg fluid balance. Not endorsing any complaints at this time.        PAST MEDICAL & SURGICAL HISTORY:  Afib    GERD (gastroesophageal reflux disease)    MELISSA (acute kidney injury)    Anemia    Hyponatremia    Hypotension    Alcoholic cirrhosis    Atelectasis        Review of Systems:  CONSTITUTIONAL: No fever, chills, or fatigue  EYES: No eye pain, visual disturbances, or discharge  ENMT:  No difficulty hearing, tinnitus, vertigo; No sinus or throat pain  NECK: No pain or stiffness  RESPIRATORY: No cough, wheezing, chills or hemoptysis; No shortness of breath  CARDIOVASCULAR: No chest pain, palpitations, dizziness, or leg swelling  GASTROINTESTINAL: No abdominal or epigastric pain. No nausea, vomiting, or hematemesis; No diarrhea or constipation. No melena or hematochezia.  GENITOURINARY: No dysuria, frequency, hematuria, or incontinence  NEUROLOGICAL: No headaches, memory loss, loss of strength, numbness, or tremors  SKIN: No itching, burning, rashes, or lesions   MUSCULOSKELETAL: No joint pain or swelling; No muscle, back, or extremity pain  PSYCHIATRIC: No depression, anxiety, mood swings, or difficulty sleeping      Medications:  cefTRIAXone Injectable. 1000 milliGRAM(s) IV Push every 24 hours  rifAXIMin 550 milliGRAM(s) Oral two times a day  midodrine 10 milliGRAM(s) Oral every 8 hours  norepinephrine Infusion 0.05 MICROgram(s)/kG/Min IV Continuous <Continuous>  tamsulosin 0.4 milliGRAM(s) Oral at bedtime  tiotropium 18 MICROgram(s) Capsule 1 Capsule(s) Inhalation daily  heparin   Injectable 6000 Unit(s) IV Push every 6 hours PRN  heparin   Injectable 3000 Unit(s) IV Push every 6 hours PRN  heparin  Infusion.  Unit(s)/Hr IV Continuous <Continuous>  lactulose Syrup 10 Gram(s) Oral every 8 hours  pantoprazole    Tablet 40 milliGRAM(s) Oral before breakfast  folic acid 1 milliGRAM(s) Oral daily  thiamine 100 milliGRAM(s) Oral daily  chlorhexidine 4% Liquid 1 Application(s) Topical <User Schedule>        ICU Vital Signs Last 24 Hrs  T(C): 36.5 (16 Sep 2021 00:14), Max: 36.5 (15 Sep 2021 22:33)  T(F): 97.7 (16 Sep 2021 00:14), Max: 97.7 (15 Sep 2021 22:33)  HR: 64 (16 Sep 2021 00:00) (60 - 112)  BP: 107/65 (16 Sep 2021 00:00) (74/60 - 117/66)  BP(mean): 72 (16 Sep 2021 00:00) (54 - 81)  ABP: --  ABP(mean): --  RR: 18 (16 Sep 2021 00:00) (13 - 39)  SpO2: 97% (15 Sep 2021 10:15) (97% - 97%)          I&O's Detail    14 Sep 2021 07:01  -  15 Sep 2021 07:00  --------------------------------------------------------  IN:    Heparin Infusion: 210 mL    IV PiggyBack: 809 mL    Norepinephrine: 364 mL  Total IN: 1383 mL    OUT:    Voided (mL): 1720 mL  Total OUT: 1720 mL    Total NET: -337 mL      15 Sep 2021 07:01  -  16 Sep 2021 00:54  --------------------------------------------------------  IN:    Heparin Infusion: 107 mL    Norepinephrine: 59.8 mL  Total IN: 166.8 mL    OUT:    Voided (mL): 900 mL  Total OUT: 900 mL    Total NET: -733.2 mL            LABS:                        9.8    7.30  )-----------( 125      ( 15 Sep 2021 06:40 )             29.4     09-15    138  |  108  |  13  ----------------------------<  78  3.9   |  24  |  0.87    Ca    8.9      15 Sep 2021 06:40  Phos  2.8     09-15  Mg     2.2     09-15    TPro  5.9<L>  /  Alb  2.6<L>  /  TBili  0.9  /  DBili  x   /  AST  20  /  ALT  9<L>  /  AlkPhos  51  09-15          CAPILLARY BLOOD GLUCOSE        PT/INR - ( 14 Sep 2021 08:28 )   PT: 17.2 sec;   INR: 1.50 ratio         PTT - ( 15 Sep 2021 00:08 )  PTT:74.0 sec    CULTURES:  Culture Results:   No growth to date. (09-12-21 @ 19:43)  Culture Results:   No growth to date. (09-12-21 @ 16:07)        Physical Examination:    General: No acute distress.  Alert, oriented, interactive, nonfocal    HEENT: Pupils equal, reactive to light.  Symmetric.    PULM: Clear to auscultation bilaterally, no significant sputum production    CVS: regular rate and rhythm, no murmurs, rubs, or gallops    ABD: Soft, nondistended, nontender, normoactive bowel sounds, no masses    EXT: No edema, nontender    SKIN: Warm and well perfused, no rashes noted.    NEURO: A&Ox3, strength 5/5 all extremities, cranial nerves grossly intact, no focal deficits        RADIOLOGY:     < from: Xray Chest 1 View- PORTABLE-Urgent (Xray Chest 1 View- PORTABLE-Urgent .) (09.12.21 @ 16:47) >  Frontal expiratory view of the chest shows the heart to be similar in size. The lungs are clear and there is no evidence of pneumothorax nor pleural effusion.    IMPRESSION:  No active pulmonary disease.          CRITICAL CARE TIME SPENT: 32 mins  Time spent evaluating/treating patient with medical issues that pose a high risk for life threatening deterioration and/or end-organ damage, reviewing data/labs/imaging, discussing case with multidisciplinary team, discussing plan/goals of care with patient/family. Non-inclusive of procedure time.

## 2021-09-15 NOTE — PROGRESS NOTE ADULT - ASSESSMENT
56 y/o M with a h/o HTN, BPH, GERD, chronic a fib on pradaxa, alcoholic cirrhosis, with shock, bradycardia.    - actively titrating norepinephrine to maintain a MAP > 65  - caution with midodrine as it may worsen bradycardia  - will challenge with 1L LR given GI losses and hyperdynamic LV function noted from recent TTE  - serum cortisol only 11 in setting of shock state, will consider stress dose hydrocortisone   - empiric ceftriaxone for now, without clear evidence of any infectious source, blood cultures neg for growth thus far  - check procalcitonin level  - heparin infusion for systemic a/c, will discuss transition back to Pradaxa   56 y/o M with a h/o HTN, BPH, GERD, chronic a fib on pradaxa, alcoholic cirrhosis, with shock, bradycardia.    - actively titrating norepinephrine to maintain a MAP > 65  - caution with midodrine as it may worsen bradycardia  - will challenge with 1L LR given GI losses, 2+ L overall net neg fluid balance, and hyperdynamic LV function noted from recent TTE  - serum cortisol only 11 in setting of shock state, will consider stress dose hydrocortisone   - empiric ceftriaxone for now, without clear evidence of any infectious source, blood cultures neg for growth thus far  - check procalcitonin level  - heparin infusion for systemic a/c, will discuss transition back to Pradaxa   56 y/o M with a h/o HTN, BPH, GERD, chronic a fib on pradaxa, alcoholic cirrhosis, with shock, bradycardia.    - actively titrating norepinephrine to maintain a MAP > 65  - caution with midodrine as it may worsen bradycardia  - will challenge with 1L LR given GI losses, 2+ L overall net neg fluid balance, and hyperdynamic LV function noted from recent TTE  - serum cortisol only 11 in setting of shock state, will consider stress dose hydrocortisone   - empiric ceftriaxone for now, without clear evidence of any infectious source, blood cultures neg for growth thus far  - check procalcitonin level  - heparin infusion for systemic a/c, will discuss transition back to Pradaxa  - PT consult

## 2021-09-15 NOTE — PROGRESS NOTE ADULT - ASSESSMENT
Shock, likely nadolol induced  ETOH cirrhosis   PAF on Pradaxa    on levo 0.03, HR 70s, will add midodrine   iv heparin   empiric ceftriaxone - duration TBD  oob once off levo

## 2021-09-16 LAB
ALBUMIN SERPL ELPH-MCNC: 2.4 G/DL — LOW (ref 3.3–5)
ALP SERPL-CCNC: 46 U/L — SIGNIFICANT CHANGE UP (ref 40–120)
ALT FLD-CCNC: 7 U/L — LOW (ref 12–78)
ANION GAP SERPL CALC-SCNC: 5 MMOL/L — SIGNIFICANT CHANGE UP (ref 5–17)
APTT BLD: 74.7 SEC — HIGH (ref 27.5–35.5)
AST SERPL-CCNC: 17 U/L — SIGNIFICANT CHANGE UP (ref 15–37)
BASOPHILS # BLD AUTO: 0.04 K/UL — SIGNIFICANT CHANGE UP (ref 0–0.2)
BASOPHILS NFR BLD AUTO: 0.8 % — SIGNIFICANT CHANGE UP (ref 0–2)
BILIRUB SERPL-MCNC: 0.8 MG/DL — SIGNIFICANT CHANGE UP (ref 0.2–1.2)
BUN SERPL-MCNC: 11 MG/DL — SIGNIFICANT CHANGE UP (ref 7–23)
CALCIUM SERPL-MCNC: 8.9 MG/DL — SIGNIFICANT CHANGE UP (ref 8.5–10.1)
CHLORIDE SERPL-SCNC: 106 MMOL/L — SIGNIFICANT CHANGE UP (ref 96–108)
CO2 SERPL-SCNC: 25 MMOL/L — SIGNIFICANT CHANGE UP (ref 22–31)
CREAT SERPL-MCNC: 0.67 MG/DL — SIGNIFICANT CHANGE UP (ref 0.5–1.3)
EOSINOPHIL # BLD AUTO: 0.22 K/UL — SIGNIFICANT CHANGE UP (ref 0–0.5)
EOSINOPHIL NFR BLD AUTO: 4.2 % — SIGNIFICANT CHANGE UP (ref 0–6)
GLUCOSE SERPL-MCNC: 74 MG/DL — SIGNIFICANT CHANGE UP (ref 70–99)
HCT VFR BLD CALC: 29.4 % — LOW (ref 39–50)
HGB BLD-MCNC: 9.7 G/DL — LOW (ref 13–17)
IMM GRANULOCYTES NFR BLD AUTO: 0.2 % — SIGNIFICANT CHANGE UP (ref 0–1.5)
LYMPHOCYTES # BLD AUTO: 1.98 K/UL — SIGNIFICANT CHANGE UP (ref 1–3.3)
LYMPHOCYTES # BLD AUTO: 37.9 % — SIGNIFICANT CHANGE UP (ref 13–44)
MAGNESIUM SERPL-MCNC: 2 MG/DL — SIGNIFICANT CHANGE UP (ref 1.6–2.6)
MCHC RBC-ENTMCNC: 29.9 PG — SIGNIFICANT CHANGE UP (ref 27–34)
MCHC RBC-ENTMCNC: 33 GM/DL — SIGNIFICANT CHANGE UP (ref 32–36)
MCV RBC AUTO: 90.7 FL — SIGNIFICANT CHANGE UP (ref 80–100)
MONOCYTES # BLD AUTO: 0.39 K/UL — SIGNIFICANT CHANGE UP (ref 0–0.9)
MONOCYTES NFR BLD AUTO: 7.5 % — SIGNIFICANT CHANGE UP (ref 2–14)
NEUTROPHILS # BLD AUTO: 2.58 K/UL — SIGNIFICANT CHANGE UP (ref 1.8–7.4)
NEUTROPHILS NFR BLD AUTO: 49.4 % — SIGNIFICANT CHANGE UP (ref 43–77)
PHOSPHATE SERPL-MCNC: 3 MG/DL — SIGNIFICANT CHANGE UP (ref 2.5–4.5)
PLATELET # BLD AUTO: 117 K/UL — LOW (ref 150–400)
POTASSIUM SERPL-MCNC: 3.9 MMOL/L — SIGNIFICANT CHANGE UP (ref 3.5–5.3)
POTASSIUM SERPL-SCNC: 3.9 MMOL/L — SIGNIFICANT CHANGE UP (ref 3.5–5.3)
PROCALCITONIN SERPL-MCNC: 0.1 NG/ML — SIGNIFICANT CHANGE UP (ref 0.02–0.1)
PROT SERPL-MCNC: 5.6 GM/DL — LOW (ref 6–8.3)
RBC # BLD: 3.24 M/UL — LOW (ref 4.2–5.8)
RBC # FLD: 14.6 % — HIGH (ref 10.3–14.5)
SODIUM SERPL-SCNC: 136 MMOL/L — SIGNIFICANT CHANGE UP (ref 135–145)
WBC # BLD: 5.22 K/UL — SIGNIFICANT CHANGE UP (ref 3.8–10.5)
WBC # FLD AUTO: 5.22 K/UL — SIGNIFICANT CHANGE UP (ref 3.8–10.5)

## 2021-09-16 PROCEDURE — 99232 SBSQ HOSP IP/OBS MODERATE 35: CPT

## 2021-09-16 RX ORDER — SODIUM CHLORIDE 9 MG/ML
1000 INJECTION, SOLUTION INTRAVENOUS ONCE
Refills: 0 | Status: COMPLETED | OUTPATIENT
Start: 2021-09-16 | End: 2021-09-16

## 2021-09-16 RX ORDER — METOPROLOL TARTRATE 50 MG
12.5 TABLET ORAL EVERY 8 HOURS
Refills: 0 | Status: DISCONTINUED | OUTPATIENT
Start: 2021-09-16 | End: 2021-09-17

## 2021-09-16 RX ORDER — HYDROCORTISONE 20 MG
10 TABLET ORAL
Refills: 0 | Status: DISCONTINUED | OUTPATIENT
Start: 2021-09-16 | End: 2021-09-17

## 2021-09-16 RX ADMIN — CHLORHEXIDINE GLUCONATE 1 APPLICATION(S): 213 SOLUTION TOPICAL at 06:30

## 2021-09-16 RX ADMIN — Medication 10 MILLIGRAM(S): at 22:08

## 2021-09-16 RX ADMIN — PANTOPRAZOLE SODIUM 40 MILLIGRAM(S): 20 TABLET, DELAYED RELEASE ORAL at 06:29

## 2021-09-16 RX ADMIN — TIOTROPIUM BROMIDE 1 CAPSULE(S): 18 CAPSULE ORAL; RESPIRATORY (INHALATION) at 08:19

## 2021-09-16 RX ADMIN — TAMSULOSIN HYDROCHLORIDE 0.4 MILLIGRAM(S): 0.4 CAPSULE ORAL at 22:08

## 2021-09-16 RX ADMIN — MIDODRINE HYDROCHLORIDE 10 MILLIGRAM(S): 2.5 TABLET ORAL at 06:29

## 2021-09-16 RX ADMIN — MIDODRINE HYDROCHLORIDE 10 MILLIGRAM(S): 2.5 TABLET ORAL at 22:08

## 2021-09-16 RX ADMIN — LACTULOSE 10 GRAM(S): 10 SOLUTION ORAL at 22:08

## 2021-09-16 RX ADMIN — LACTULOSE 10 GRAM(S): 10 SOLUTION ORAL at 13:50

## 2021-09-16 RX ADMIN — SODIUM CHLORIDE 1000 MILLILITER(S): 9 INJECTION, SOLUTION INTRAVENOUS at 01:05

## 2021-09-16 RX ADMIN — Medication 1 MILLIGRAM(S): at 11:04

## 2021-09-16 RX ADMIN — LACTULOSE 10 GRAM(S): 10 SOLUTION ORAL at 06:30

## 2021-09-16 RX ADMIN — Medication 12.5 MILLIGRAM(S): at 18:01

## 2021-09-16 RX ADMIN — Medication 100 MILLIGRAM(S): at 11:04

## 2021-09-16 RX ADMIN — Medication 10 MILLIGRAM(S): at 08:38

## 2021-09-16 RX ADMIN — MIDODRINE HYDROCHLORIDE 10 MILLIGRAM(S): 2.5 TABLET ORAL at 13:50

## 2021-09-16 RX ADMIN — HEPARIN SODIUM 900 UNIT(S)/HR: 5000 INJECTION INTRAVENOUS; SUBCUTANEOUS at 06:37

## 2021-09-16 NOTE — PROGRESS NOTE ADULT - TIME BILLING
will d/w EP re: BB, DOAC   ambulated in unit today   midodrine  hydrocortisone 10 mg bid   tele transfer Off levo  Continue midodrine   Add hydrocortisone as BP low normal and need to start metoprolol for A.fib with RVR       will d/w EP re: BB, DOAC   ambulated in unit today   midodrine  hydrocortisone 10 mg bid   tele transfer Shock - likely nadolol induced   ETOH cirrhosis   PAF on Pradaxa   Hepatic encephalopathy     Off levo  Continue midodrine   Add hydrocortisone as BP low normal and need to start metoprolol for A.fib with RVR   May consider starting low dose digoxin if remains tachy despite metoprolol   AC with heparin for now, will likely change to DOAC after d/w EP   Not infected, d/c ceftriaxone   Ambulating    Stable for tele, report given to hospitalist at 15:50

## 2021-09-16 NOTE — PROGRESS NOTE ADULT - SUBJECTIVE AND OBJECTIVE BOX
Patient is a 55y old  Male who presents with a chief complaint of Near syncope (15 Sep 2021 23:54)    24 hour events: ***    PAST MEDICAL & SURGICAL HISTORY:  Afib    GERD (gastroesophageal reflux disease)    MELISSA (acute kidney injury)    Anemia    Hyponatremia    Hypotension    Alcoholic cirrhosis    Atelectasis      REVIEW OF SYSTEMS  Constitutional: No fever, chills, fatigue  Neuro: No headache, numbness, weakness  Resp: No cough, wheezing, shortness of breath  CVS: No chest pain, palpitations, leg swelling  GI: No abdominal pain, nausea, vomiting, diarrhea   : No dysuria, frequency, incontinence  Skin: No itching, burning, rashes, or lesions   Msk: No joint pain or swelling  Psych: No depression, anxiety, mood swings  Heme: No bleeding    T(F): 97.5 (09-16-21 @ 05:28), Max: 97.7 (09-15-21 @ 22:33)  HR: 70 (09-16-21 @ 07:00) (59 - 104)  BP: 90/47 (09-16-21 @ 07:00) (74/60 - 122/62)  RR: 17 (09-16-21 @ 06:00) (13 - 22)  SpO2: 97% (09-15-21 @ 10:15) (97% - 97%)  Wt(kg): --      CAPILLARY BLOOD GLUCOSE    I&O's Summary    09-15 @ 07:01  -  09-16 @ 07:00  --------------------------------------------------------  IN: 339.8 mL / OUT: 1600 mL / NET: -1260.2 mL    PHYSICAL EXAM  General:   CNS:   HEENT:   Resp:   CVS:   Abd:   Ext:   Skin:     MEDICATIONS  cefTRIAXone Injectable. IV Push  rifAXIMin Oral  midodrine Oral  norepinephrine Infusion IV Continuous  tamsulosin Oral  hydrocortisone Oral  tiotropium 18 MICROgram(s) Capsule Inhalation  heparin   Injectable IV Push PRN  heparin   Injectable IV Push PRN  heparin  Infusion. IV Continuous  lactulose Syrup Oral  pantoprazole    Tablet Oral  folic acid Oral  thiamine Oral  chlorhexidine 4% Liquid Topical                          9.7    5.22  )-----------( 117      ( 16 Sep 2021 05:22 )             29.4       09-16    136  |  106  |  11  ----------------------------<  74  3.9   |  25  |  0.67    Ca    8.9      16 Sep 2021 05:22  Phos  3.0     09-16  Mg     2.0     09-16    TPro  5.6<L>  /  Alb  2.4<L>  /  TBili  0.8  /  DBili  x   /  AST  17  /  ALT  7<L>  /  AlkPhos  46  09-16          PT/INR - ( 14 Sep 2021 08:28 )   PT: 17.2 sec;   INR: 1.50 ratio         PTT - ( 16 Sep 2021 05:22 )  PTT:74.7 sec    .Blood None   No growth to date. -- 09-12 @ 19:43  .Blood Blood-Peripheral   No growth to date. -- 09-12 @ 16:07       Patient is a 55y old  Male who presents with a chief complaint of Near syncope (15 Sep 2021 23:54)    24 hour events: off levo in am today   A.fib with RVR now     PAST MEDICAL & SURGICAL HISTORY:  Afib    GERD (gastroesophageal reflux disease)    MELISSA (acute kidney injury)    Anemia    Hyponatremia    Hypotension    Alcoholic cirrhosis    Atelectasis      REVIEW OF SYSTEMS  Constitutional: No fever, chills, fatigue  Neuro: No headache, numbness, weakness  Resp: No cough, wheezing, shortness of breath  CVS: No chest pain, palpitations, leg swelling  GI: No abdominal pain, nausea, vomiting, diarrhea   : No dysuria, frequency, incontinence  Skin: No itching, burning, rashes, or lesions   Msk: No joint pain or swelling  Psych: No depression, anxiety, mood swings  Heme: No bleeding    T(F): 97.5 (09-16-21 @ 05:28), Max: 97.7 (09-15-21 @ 22:33)  HR: 70 (09-16-21 @ 07:00) (59 - 104)  BP: 90/47 (09-16-21 @ 07:00) (74/60 - 122/62)  RR: 17 (09-16-21 @ 06:00) (13 - 22)  SpO2: 97% (09-15-21 @ 10:15) (97% - 97%)  Wt(kg): --      CAPILLARY BLOOD GLUCOSE    I&O's Summary    09-15 @ 07:01  -  09-16 @ 07:00  --------------------------------------------------------  IN: 339.8 mL / OUT: 1600 mL / NET: -1260.2 mL    PHYSICAL EXAM  General: NAD  CNS: awake, sitting in chair, no focal deficits, oriented x 4  HEENT: pupils reactive   Resp: clear   CVS: S1S2 irregular tachy   Abd: soft, NT, +BS  Ext: no edema   Skin: warm     MEDICATIONS  cefTRIAXone Injectable. IV Push  rifAXIMin Oral  midodrine Oral  norepinephrine Infusion IV Continuous  tamsulosin Oral  hydrocortisone Oral  tiotropium 18 MICROgram(s) Capsule Inhalation  heparin   Injectable IV Push PRN  heparin   Injectable IV Push PRN  heparin  Infusion. IV Continuous  lactulose Syrup Oral  pantoprazole    Tablet Oral  folic acid Oral  thiamine Oral  chlorhexidine 4% Liquid Topical                          9.7    5.22  )-----------( 117      ( 16 Sep 2021 05:22 )             29.4       09-16    136  |  106  |  11  ----------------------------<  74  3.9   |  25  |  0.67    Ca    8.9      16 Sep 2021 05:22  Phos  3.0     09-16  Mg     2.0     09-16    TPro  5.6<L>  /  Alb  2.4<L>  /  TBili  0.8  /  DBili  x   /  AST  17  /  ALT  7<L>  /  AlkPhos  46  09-16          PT/INR - ( 14 Sep 2021 08:28 )   PT: 17.2 sec;   INR: 1.50 ratio         PTT - ( 16 Sep 2021 05:22 )  PTT:74.7 sec    .Blood None   No growth to date. -- 09-12 @ 19:43  .Blood Blood-Peripheral   No growth to date. -- 09-12 @ 16:07

## 2021-09-16 NOTE — PHYSICAL THERAPY INITIAL EVALUATION ADULT - PERTINENT HX OF CURRENT PROBLEM, REHAB EVAL
54 y/o M with a h/o HTN, BPH, GERD, chronic a fib on pradaxa, alcoholic cirrhosis, admitted on 9/12 from rehab with near syncope. Patient noted to be hypotensive and ultimately required IV vasopressor therapy. Patient hospital course further complicated by hyponatremia and bradycardia.

## 2021-09-16 NOTE — PHYSICAL THERAPY INITIAL EVALUATION ADULT - GENERAL OBSERVATIONS, REHAB EVAL
Pt seen for 45min PT Eval. Pt s/p hypotension/bradycardia. Pt rec'd semi supine in bed in NAD in CCU, +cardiac monitor. Pt trans supine>sit with CGA. Pt trans sit<>Stand with CGA at RW. pt amb 50ft with RW and CG-min AX1, pt requires increased assist with turning. Pt dem dec step length, unstedy with turns, dec endurance. Pt HR max 160 with amb RN aware, continues to down trend from 120s sitting in chair, all needs met, TAMRA figueroa in room.

## 2021-09-16 NOTE — PHYSICAL THERAPY INITIAL EVALUATION ADULT - ADDITIONAL COMMENTS
as per CM note, pt from subacute rehab. lives in private home with spouse with 3 steps to enter. as per CM note, pt from subacute rehab. lives in private home with spouse with 3 steps to enter. prior to rehab was indep with all functional mobility no AD, indep with ADLs. Pt reports at rehab has been amb with 1 assist and RW.

## 2021-09-16 NOTE — PROGRESS NOTE ADULT - SUBJECTIVE AND OBJECTIVE BOX
HPI:  This is a 55M with Phx of HTN, BPH, Gerd, alcoholic cirrhosis, ? chronic AF who was BIBA from [facility] rehab for dizziness, hypotension and near syncope.  He is a poor historian and the SNF records are sparse.  States he was at [facility] in July for a few weeks after he went into alcohol withdrawal.  He does not have any recollection of his time at [facility].    He was sent to [facility] for physical rehab.    In USOH until this afternoon when he felt dizzy.  He states than he has not been eating or drinking much.    In ED he was found to have MELISSA with hypotension and junctional bradycardia.  Cultured, given fluids, abx and started on nor-epi for hypotension, noted with mild hyponatremia.  At rehab center he was on Digoxin, spironolactone, lasix and Nadolol 20 BID.  Stat echo reveals LVEF 65-70%    21: EP consulted for bradycardia, junctional rhythm 30's.   Digoxin and Nadolol are discontinued.  He is on Nor-epi drip and IVF.  He is awake and alert, asymptomatic at present, denies CP, SOB, dizziness, palpitations or lightheadedness    2021: Patient seen at bedside, no events noted overnight.  Patient remains bradycardic and on Levophed.  Nadolol and Digoxin have been stopped.  Patient is asymptomatic and TELEMETRY: AF with slow ventricular response 40-50s  EK21 Afib with SVR@41bpm  QRS: 100ms  QT/QTc: 554/457ms    9/15/21: pt resting in bed in NAD, remains on Levophed and IV Heparin was added for AFib   TELE: Atrial fib rate 40-60, overall HR better, no significant pauses seen    21: pt awake and alert, in NAD.  Off Levophed, now on midodrine.  TELE: AF, rates better 60-80 bpm, had brief rapid AF working with PT this morning    MEDICATIONS  (STANDING):  chlorhexidine 4% Liquid 1 Application(s) Topical <User Schedule>  folic acid 1 milliGRAM(s) Oral daily  heparin  Infusion.  Unit(s)/Hr (13 mL/Hr) IV Continuous <Continuous>  hydrocortisone 10 milliGRAM(s) Oral two times a day  lactulose Syrup 10 Gram(s) Oral every 8 hours  midodrine 10 milliGRAM(s) Oral every 8 hours  pantoprazole    Tablet 40 milliGRAM(s) Oral before breakfast  rifAXIMin 550 milliGRAM(s) Oral two times a day  tamsulosin 0.4 milliGRAM(s) Oral at bedtime  thiamine 100 milliGRAM(s) Oral daily  tiotropium 18 MICROgram(s) Capsule 1 Capsule(s) Inhalation daily    MEDICATIONS  (PRN):  heparin   Injectable 6000 Unit(s) IV Push every 6 hours PRN For aPTT less than 40  heparin   Injectable 3000 Unit(s) IV Push every 6 hours PRN For aPTT between 40 - 57    Allergies    No Known Allergies    Intolerances      REVIEW OF SYSTEMS:    CONSTITUTIONAL: No weakness, fevers or chills  EYES/ENT: No visual changes;  No vertigo or throat pain   NECK: No pain or stiffness  RESPIRATORY: No cough, wheezing, hemoptysis; No shortness of breath  CARDIOVASCULAR: No chest pain or palpitations  GASTROINTESTINAL: No abdominal or epigastric pain. No nausea, vomiting, or hematemesis; No diarrhea or constipation. No melena or hematochezia.  GENITOURINARY: No dysuria, frequency or hematuria  NEUROLOGICAL: No numbness or weakness  SKIN: No itching, burning, rashes, or lesions   All other review of systems is negative unless indicated above  denies any CP, palpitations, SOB, dizziness, lightheadedness.      Vital Signs Last 24 Hrs  T(C): 36.4 (21 @ 05:28), Max: 36.5 (09-15-21 @ 22:33)  T(F): 97.5 (21 @ 05:28), Max: 97.7 (09-15-21 @ 22:33)  HR: 125 (21 @ 11:00) (59 - 125)  BP: 104/82 (21 @ 11:00) (74/60 - 151/72)  BP(mean): 87 (21 @ 11:00) (57 - 92)  RR: 15 (21 @ 11:00) (13 - 23)  SpO2: 96% (21 @ 10:00) (96% - 100%)                          9.7    5.22  )-----------( 117      ( 16 Sep 2021 05:22 )             29.4         136  |  106  |  11  ----------------------------<  74  3.9   |  25  |  0.67    Ca    8.9      16 Sep 2021 05:22  Phos  3.0       Mg     2.0         TPro  5.6<L>  /  Alb  2.4<L>  /  TBili  0.8  /  DBili  x   /  AST  17  /  ALT  7<L>  /  AlkPhos  46        PHYSICAL EXAMINATION:    GENERAL APPEARANCE:  Pt. is not currently dyspneic, in no distress. Pt. is A & O x 3, poor historian  HEENT:  Pupils are normal and react normally. No icterus. Mucous membranes well colored.  NECK:  Supple. No lymphadenopathy. Jugular venous pressure not elevated. Carotids equal.   HEART:   S1S2 irreg. There are no murmurs, rubs or gallops noted  CHEST:  Chest is clear to auscultation. Normal respiratory effort.  ABDOMEN:  Soft and nontender.   EXTREMITIES:  There is no edema.   SKIN:  No rash or significant lesions are noted.      CARDIAC TESTS:  < from: TTE Echo Complete w/o Contrast w/ Doppler (21 @ 19:06) >     Impression     Summary     Mild mitral annular calcification is present.   Fibrocalcific changes noted to the mitral valve leaflets with preserved   leaflet excursion.   Mild (1+) mitral regurgitation is present.   The aortic valve is trileaflet with thin pliable leaflets.   The tricuspid valve leaflets are thin and pliable; valve motion is normal.   Moderate (2+) tricuspid valve regurgitation is present.   Mild pulmonary hypertension.   The left atrium is severely dilated.   The left ventricle is normal in size, wall thickness, wall motion and   contractility.   Estimated left ventricular ejection fraction is 65-70 %.   The right atrium appears severely dilated.   Normal appearing right ventricle structure and function.   The IVC appears normal. HPI:  This is a 55M with Phx of HTN, BPH, Gerd, alcoholic cirrhosis, ? chronic AF who was BIBA from [facility] rehab for dizziness, hypotension and near syncope.  He is a poor historian and the SNF records are sparse.  States he was at [facility] [place] a few weeks after he went into alcohol withdrawal.  He does not have any recollection of his time at [facility].    He was sent to [facility] for physical rehab.    In USOH until this afternoon when he felt dizzy.  He states than he has not been eating or drinking much.    In ED he was found to have MELISSA with hypotension and junctional bradycardia.  Cultured, given fluids, abx and started on nor-epi for hypotension, noted with mild hyponatremia.  At rehab center he was on Digoxin, spironolactone, lasix and Nadolol 20 BID.  Stat echo reveals LVEF 65-70%    21: EP consulted for bradycardia, junctional rhythm 30's.   Digoxin and Nadolol are discontinued.  He is on Nor-epi drip and IVF.  He is awake and alert, asymptomatic at present, denies CP, SOB, dizziness, palpitations or lightheadedness    2021: Patient seen at bedside, no events noted overnight.  Patient remains bradycardic and on Levophed.  Nadolol and Digoxin have been stopped.  Patient is asymptomatic and TELEMETRY: AF with slow ventricular response 40-50s  EK21 Afib with SVR@41bpm  QRS: 100ms  QT/QTc: 554/457ms    9/15/21: pt resting in bed in NAD, remains on Levophed and IV Heparin was added for AFib   TELE: Atrial fib rate 40-60, overall HR better, no significant pauses seen    21: pt awake and alert, in NAD.  Off Levophed, now on midodrine.  TELE: AF, rates better 60-80 bpm, had brief rapid AF overnight and when working with PT this morning    MEDICATIONS  (STANDING):  chlorhexidine 4% Liquid 1 Application(s) Topical <User Schedule>  folic acid 1 milliGRAM(s) Oral daily  heparin  Infusion.  Unit(s)/Hr (13 mL/Hr) IV Continuous <Continuous>  hydrocortisone 10 milliGRAM(s) Oral two times a day  lactulose Syrup 10 Gram(s) Oral every 8 hours  midodrine 10 milliGRAM(s) Oral every 8 hours  pantoprazole    Tablet 40 milliGRAM(s) Oral before breakfast  rifAXIMin 550 milliGRAM(s) Oral two times a day  tamsulosin 0.4 milliGRAM(s) Oral at bedtime  thiamine 100 milliGRAM(s) Oral daily  tiotropium 18 MICROgram(s) Capsule 1 Capsule(s) Inhalation daily    MEDICATIONS  (PRN):  heparin   Injectable 6000 Unit(s) IV Push every 6 hours PRN For aPTT less than 40  heparin   Injectable 3000 Unit(s) IV Push every 6 hours PRN For aPTT between 40 - 57    Allergies    No Known Allergies    Intolerances      REVIEW OF SYSTEMS:    CONSTITUTIONAL: No weakness, fevers or chills  EYES/ENT: No visual changes;  No vertigo or throat pain   NECK: No pain or stiffness  RESPIRATORY: No cough, wheezing, hemoptysis; No shortness of breath  CARDIOVASCULAR: No chest pain or palpitations  GASTROINTESTINAL: No abdominal or epigastric pain. No nausea, vomiting, or hematemesis; No diarrhea or constipation. No melena or hematochezia.  GENITOURINARY: No dysuria, frequency or hematuria  NEUROLOGICAL: No numbness or weakness  SKIN: No itching, burning, rashes, or lesions   All other review of systems is negative unless indicated above  denies any CP, palpitations, SOB, dizziness, lightheadedness.      Vital Signs Last 24 Hrs  T(C): 36.4 (21 @ 05:28), Max: 36.5 (09-15-21 @ 22:33)  T(F): 97.5 (21 @ 05:28), Max: 97.7 (09-15-21 @ 22:33)  HR: 125 (21 @ 11:00) (59 - 125)  BP: 104/82 (21 @ 11:00) (74/60 - 151/72)  BP(mean): 87 (21 @ 11:00) (57 - 92)  RR: 15 (21 @ 11:00) (13 - 23)  SpO2: 96% (21 @ 10:00) (96% - 100%)                          9.7    5.22  )-----------( 117      ( 16 Sep 2021 05:22 )             29.4         136  |  106  |  11  ----------------------------<  74  3.9   |  25  |  0.67    Ca    8.9      16 Sep 2021 05:22  Phos  3.0       Mg     2.0         TPro  5.6<L>  /  Alb  2.4<L>  /  TBili  0.8  /  DBili  x   /  AST  17  /  ALT  7<L>  /  AlkPhos  46        PHYSICAL EXAMINATION:    GENERAL APPEARANCE:  Pt. is not currently dyspneic, in no distress. Pt. is A & O x 3, poor historian  HEENT:  Pupils are normal and react normally. No icterus. Mucous membranes well colored.  NECK:  Supple. No lymphadenopathy. Jugular venous pressure not elevated. Carotids equal.   HEART:   S1S2 irreg. There are no murmurs, rubs or gallops noted  CHEST:  Chest is clear to auscultation. Normal respiratory effort.  ABDOMEN:  Soft and nontender.   EXTREMITIES:  There is no edema.   SKIN:  No rash or significant lesions are noted.      CARDIAC TESTS:  < from: TTE Echo Complete w/o Contrast w/ Doppler (21 @ 19:06) >     Impression     Summary     Mild mitral annular calcification is present.   Fibrocalcific changes noted to the mitral valve leaflets with preserved   leaflet excursion.   Mild (1+) mitral regurgitation is present.   The aortic valve is trileaflet with thin pliable leaflets.   The tricuspid valve leaflets are thin and pliable; valve motion is normal.   Moderate (2+) tricuspid valve regurgitation is present.   Mild pulmonary hypertension.   The left atrium is severely dilated.   The left ventricle is normal in size, wall thickness, wall motion and   contractility.   Estimated left ventricular ejection fraction is 65-70 %.   The right atrium appears severely dilated.   Normal appearing right ventricle structure and function.   The IVC appears normal.

## 2021-09-16 NOTE — PROGRESS NOTE ADULT - ASSESSMENT
55 yr old male with above history BIBA from [facility] rehab for dizziness, hypotension and near syncope.      A/P:  Near syncope, bradycardia, hypotension/shock  Continue tele monitoring and CCU care  Has had some rapid AF off beta blockers overnight and this morning  Off pressors, now on Midodrine PO  Continue to hold Nadolol, Digoxin, diuretics.  Atrial fib- continue IV Heparin for anticoagulation.  Keep K>4.0, Mg>2.0  LVEF 65-70%, +1MR, +2TR  Will continue to follow  Will discuss plan with Dr. Mckinley   55 yr old male with above history BIBA from [facility] rehab for dizziness, hypotension and near syncope.      A/P:  Near syncope, bradycardia, hypotension/shock  Continue tele monitoring and CCU care  Off pressors, now on Midodrine PO  Has had some rapid AF off beta blockers overnight and this morning  Can consider trial of low dose metoprolol tartrate if needed for rate control  Atrial fib- continue IV Heparin for anticoagulation.  Keep K>4.0, Mg>2.0  LVEF 65-70%, +1MR, +2TR  Will continue to follow  Discussed plan with Dr. Mckinley, pt, RN

## 2021-09-17 LAB
APTT BLD: 81.8 SEC — HIGH (ref 27.5–35.5)
HCT VFR BLD CALC: 28.9 % — LOW (ref 39–50)
HGB BLD-MCNC: 9.4 G/DL — LOW (ref 13–17)
LACTATE SERPL-SCNC: 1.1 MMOL/L — SIGNIFICANT CHANGE UP (ref 0.7–2)
MAGNESIUM SERPL-MCNC: 2 MG/DL — SIGNIFICANT CHANGE UP (ref 1.6–2.6)
MCHC RBC-ENTMCNC: 30.2 PG — SIGNIFICANT CHANGE UP (ref 27–34)
MCHC RBC-ENTMCNC: 32.5 GM/DL — SIGNIFICANT CHANGE UP (ref 32–36)
MCV RBC AUTO: 92.9 FL — SIGNIFICANT CHANGE UP (ref 80–100)
PHOSPHATE SERPL-MCNC: 3.4 MG/DL — SIGNIFICANT CHANGE UP (ref 2.5–4.5)
PLATELET # BLD AUTO: 100 K/UL — LOW (ref 150–400)
RBC # BLD: 3.11 M/UL — LOW (ref 4.2–5.8)
RBC # FLD: 14.7 % — HIGH (ref 10.3–14.5)
WBC # BLD: 4.98 K/UL — SIGNIFICANT CHANGE UP (ref 3.8–10.5)
WBC # FLD AUTO: 4.98 K/UL — SIGNIFICANT CHANGE UP (ref 3.8–10.5)

## 2021-09-17 PROCEDURE — 99233 SBSQ HOSP IP/OBS HIGH 50: CPT | Mod: GC

## 2021-09-17 PROCEDURE — 99232 SBSQ HOSP IP/OBS MODERATE 35: CPT

## 2021-09-17 RX ORDER — MIDODRINE HYDROCHLORIDE 2.5 MG/1
20 TABLET ORAL EVERY 8 HOURS
Refills: 0 | Status: DISCONTINUED | OUTPATIENT
Start: 2021-09-17 | End: 2021-09-17

## 2021-09-17 RX ORDER — HYDROCORTISONE 20 MG
50 TABLET ORAL EVERY 6 HOURS
Refills: 0 | Status: DISCONTINUED | OUTPATIENT
Start: 2021-09-17 | End: 2021-09-21

## 2021-09-17 RX ORDER — SODIUM CHLORIDE 9 MG/ML
1000 INJECTION INTRAMUSCULAR; INTRAVENOUS; SUBCUTANEOUS ONCE
Refills: 0 | Status: COMPLETED | OUTPATIENT
Start: 2021-09-17 | End: 2021-09-17

## 2021-09-17 RX ORDER — MIDODRINE HYDROCHLORIDE 2.5 MG/1
10 TABLET ORAL EVERY 8 HOURS
Refills: 0 | Status: DISCONTINUED | OUTPATIENT
Start: 2021-09-17 | End: 2021-09-19

## 2021-09-17 RX ADMIN — LACTULOSE 10 GRAM(S): 10 SOLUTION ORAL at 17:15

## 2021-09-17 RX ADMIN — Medication 50 MILLIGRAM(S): at 17:14

## 2021-09-17 RX ADMIN — TAMSULOSIN HYDROCHLORIDE 0.4 MILLIGRAM(S): 0.4 CAPSULE ORAL at 21:53

## 2021-09-17 RX ADMIN — Medication 50 MILLIGRAM(S): at 21:52

## 2021-09-17 RX ADMIN — HEPARIN SODIUM 900 UNIT(S)/HR: 5000 INJECTION INTRAVENOUS; SUBCUTANEOUS at 10:47

## 2021-09-17 RX ADMIN — MIDODRINE HYDROCHLORIDE 10 MILLIGRAM(S): 2.5 TABLET ORAL at 17:15

## 2021-09-17 RX ADMIN — Medication 50 MILLIGRAM(S): at 10:45

## 2021-09-17 RX ADMIN — LACTULOSE 10 GRAM(S): 10 SOLUTION ORAL at 04:16

## 2021-09-17 RX ADMIN — PANTOPRAZOLE SODIUM 40 MILLIGRAM(S): 20 TABLET, DELAYED RELEASE ORAL at 10:45

## 2021-09-17 RX ADMIN — SODIUM CHLORIDE 1000 MILLILITER(S): 9 INJECTION INTRAMUSCULAR; INTRAVENOUS; SUBCUTANEOUS at 10:45

## 2021-09-17 RX ADMIN — Medication 100 MILLIGRAM(S): at 10:44

## 2021-09-17 RX ADMIN — LACTULOSE 10 GRAM(S): 10 SOLUTION ORAL at 21:51

## 2021-09-17 RX ADMIN — MIDODRINE HYDROCHLORIDE 10 MILLIGRAM(S): 2.5 TABLET ORAL at 21:52

## 2021-09-17 RX ADMIN — Medication 1 MILLIGRAM(S): at 10:44

## 2021-09-17 RX ADMIN — SODIUM CHLORIDE 1000 MILLILITER(S): 9 INJECTION INTRAMUSCULAR; INTRAVENOUS; SUBCUTANEOUS at 03:08

## 2021-09-17 RX ADMIN — MIDODRINE HYDROCHLORIDE 10 MILLIGRAM(S): 2.5 TABLET ORAL at 04:17

## 2021-09-17 NOTE — PROGRESS NOTE ADULT - ASSESSMENT
55 yr old male with above history of dizziness, hypotension and near syncope.     #Bradycardia, hypotension/shock  Possibly due to sotalol. Pt is in ccu under constant telemetry  hydrocortisone sodium succinate Injectable 50 milliGRAM(s) IV Push every 6 hours  midodrine 10 milliGRAM(s) Oral every 8 hours  betablocker stopped due to induced hypotension and bradycardia    #?Adrenal insufficiency  Pt's cortisol level is low.  hydrocortisone sodium succinate Injectable 50 milliGRAM(s) IV Push every 6 hours    #A-Fib-  Stable and he is on heparin    #Liver cirrhosis/Hepatic encephalopathy  He is on rifaximin 550 milliGRAM(s) Oral two times a day    #GERD  Pantoprazole    We will continue to monitor him closely.  Case was discussed with

## 2021-09-17 NOTE — PROGRESS NOTE ADULT - SUBJECTIVE AND OBJECTIVE BOX
HPI:  This is a 55M with Phx of HTN, BPH, Gerd, alcoholic cirrhosis, ? chronic AF who was BIBA from [facility] rehab for dizziness, hypotension and near syncope.  He is a poor historian and the SNF records are sparse.  States he was at [facility] in July for a few weeks after he went into alcohol withdrawal.  He does not have any recollection of his time at [facility].    He was sent to [facility] for physical rehab.    In USOH until this afternoon when he felt dizzy.  He states than he has not been eating or drinking much.    In ED he was found to have MELISSA with hypotension and junctional bradycardia.  Cultured, given fluids, abx and started on nor-epi for hypotension, noted with mild hyponatremia.  At rehab center he was on Digoxin, spironolactone, lasix and Nadolol 20 BID.  Stat echo reveals LVEF 65-70%    21: EP consulted for bradycardia, junctional rhythm 30's.   Digoxin and Nadolol are discontinued.  He is on Nor-epi drip and IVF.  He is awake and alert, asymptomatic at present, denies CP, SOB, dizziness, palpitations or lightheadedness    2021: Patient seen at bedside, no events noted overnight.  Patient remains bradycardic and on Levophed.  Nadolol and Digoxin have been stopped.  Patient is asymptomatic and TELEMETRY: AF with slow ventricular response 40-50s  EK21 Afib with SVR@41bpm  QRS: 100ms  QT/QTc: 554/457ms    9/15/21: pt resting in bed in NAD, remains on Levophed and IV Heparin was added for AFib   TELE: Atrial fib rate 40-60, overall HR better, no significant pauses seen    21: pt awake and alert, in NAD.  Off Levophed, now on midodrine.  TELE: AF, rates better 60-80 bpm, had brief rapid AF overnight and when working with PT this morning    21: pt in NAD, hypotensive overnight after 1 dose of low dose lopressor, remains asymptomatic  TELE: Afib, no significant pauses, no rapid rates since yesterday.      MEDICATIONS  (STANDING):  folic acid 1 milliGRAM(s) Oral daily  heparin  Infusion.  Unit(s)/Hr (13 mL/Hr) IV Continuous <Continuous>  hydrocortisone sodium succinate Injectable 50 milliGRAM(s) IV Push every 6 hours  lactulose Syrup 10 Gram(s) Oral every 8 hours  midodrine 10 milliGRAM(s) Oral every 8 hours  pantoprazole    Tablet 40 milliGRAM(s) Oral before breakfast  rifAXIMin 550 milliGRAM(s) Oral two times a day  tamsulosin 0.4 milliGRAM(s) Oral at bedtime  thiamine 100 milliGRAM(s) Oral daily  tiotropium 18 MICROgram(s) Capsule 1 Capsule(s) Inhalation daily    MEDICATIONS  (PRN):  heparin   Injectable 6000 Unit(s) IV Push every 6 hours PRN For aPTT less than 40  heparin   Injectable 3000 Unit(s) IV Push every 6 hours PRN For aPTT between 40 - 57    Allergies    No Known Allergies        REVIEW OF SYSTEMS:    CONSTITUTIONAL: No weakness, fevers or chills  EYES/ENT: No visual changes;  No vertigo or throat pain   NECK: No pain or stiffness  RESPIRATORY: No cough, wheezing, hemoptysis; No shortness of breath  CARDIOVASCULAR: No chest pain or palpitations  GASTROINTESTINAL: No abdominal or epigastric pain. No nausea, vomiting, or hematemesis; No diarrhea or constipation. No melena or hematochezia.  GENITOURINARY: No dysuria, frequency or hematuria  NEUROLOGICAL: No numbness or weakness  SKIN: No itching, burning, rashes, or lesions   All other review of systems is negative unless indicated above  denies any CP, palpitations, SOB, dizziness, lightheadedness.      Vital Signs Last 24 Hrs  T(C): 36.4 (17 Sep 2021 07:40), Max: 37.1 (16 Sep 2021 22:07)  T(F): 97.6 (17 Sep 2021 07:40), Max: 98.7 (16 Sep 2021 22:07)  HR: 69 (17 Sep 2021 12:00) (54 - 84)  BP: 79/50 (17 Sep 2021 12:00) (74/34 - 110/46)  BP(mean): 56 (17 Sep 2021 12:00) (43 - 96)  RR: 16 (17 Sep 2021 12:00) (14 - 21)  SpO2: 100% (17 Sep 2021 10:00) (97% - 100%)                          9.4    4.98  )-----------( 100      ( 17 Sep 2021 05:09 )             28.9           136  |  107  |  10  ----------------------------<  90  4.5   |  24  |  0.70    Ca    8.6      17 Sep 2021 05:12  Phos  3.4       Mg     2.0         TPro  5.8<L>  /  Alb  2.3<L>  /  TBili  0.7  /  DBili  x   /  AST  14<L>  /  ALT  8<L>  /  AlkPhos  50      PHYSICAL EXAMINATION:    GENERAL APPEARANCE:  Pt. is not currently dyspneic, in no distress. Pt. is A & O x 3, poor historian  HEENT:  Pupils are normal and react normally. No icterus. Mucous membranes well colored.  NECK:  Supple. No lymphadenopathy. Jugular venous pressure not elevated. Carotids equal.   HEART:   S1S2 irreg. There are no murmurs, rubs or gallops noted  CHEST:  Chest is clear to auscultation. Normal respiratory effort.  ABDOMEN:  Soft and nontender.   EXTREMITIES:  There is no edema.   SKIN:  No rash or significant lesions are noted.    CARDIAC TESTS:  < from: TTE Echo Complete w/o Contrast w/ Doppler (21 @ 19:06) >     Impression     Summary     Mild mitral annular calcification is present.   Fibrocalcific changes noted to the mitral valve leaflets with preserved   leaflet excursion.   Mild (1+) mitral regurgitation is present.   The aortic valve is trileaflet with thin pliable leaflets.   The tricuspid valve leaflets are thin and pliable; valve motion is normal.   Moderate (2+) tricuspid valve regurgitation is present.   Mild pulmonary hypertension.   The left atrium is severely dilated.   The left ventricle is normal in size, wall thickness, wall motion and   contractility.   Estimated left ventricular ejection fraction is 65-70 %.   The right atrium appears severely dilated.   Normal appearing right ventricle structure and function.   The IVC appears normal.

## 2021-09-17 NOTE — PROGRESS NOTE ADULT - SUBJECTIVE AND OBJECTIVE BOX
55M hx HTN BPH Gerd chronic a fib ??? on pradaxa.  GERD alcoholic cirrhosis     First time in [place].  His hx is not really reliable and the SNF records are sparse.  States he was at [facility] in July for a few weeks after he went into alcohol withdrawal.  He does not have any recollection of his time at [facility].      He was sent to [facility] SNF for physical rehab.      In USOH until this afternoon when her felt dizzy.  He states than he has not been eating or drinking much.    Admit with MELISSA hypotension and junctional nena cardia.  Cultured given fluids abx and started on nor-epi  noted with mild hyponatremia  He is on diuretics nadolol and digoxin.      Subjective-        55M hx HTN BPH Gerd chronic a fib ??? on pradaxa.  GERD alcoholic cirrhosis     First time in [place].  His hx is not really reliable and the SNF records are sparse.  States he was at [facility] in July for a few weeks after he went into alcohol withdrawal.  He does not have any recollection of his time at [facility].      He was sent to [facility] SNF for physical rehab.      In USOH until this afternoon when her felt dizzy.  He states than he has not been eating or drinking much.    Admit with MELISSA hypotension and junctional nena cardia.  Cultured given fluids abx and started on nor-epi  noted with mild hyponatremia  He is on diuretics nadolol and digoxin.    9/17/2021  Subjective- Pt was seen by his bedside, awake oriented to place and person. Presently, he have no complaints. Denies chest pain, sob, nausea or vomiting.    REVIEW OF SYSTEMS:    CONSTITUTIONAL: no fevers or chills  EYES/ENT: No visual changes;  No vertigo or throat pain   NECK: No pain or stiffness  RESPIRATORY: No cough, wheezing, hemoptysis; No shortness of breath  CARDIOVASCULAR: No chest pain or palpitations  GASTROINTESTINAL: No abdominal or epigastric pain. No nausea, vomiting, or hematemesis; No diarrhea or constipation. No melena or hematochezia.  GENITOURINARY: No dysuria, frequency or hematuria  NEUROLOGICAL: No numbness or weakness  SKIN: No itching, rashes    Physical examination  GENERAL: NAD, lying in bed comfortably  HEAD:  Atraumatic, Normocephalic  NECK: Supple, No JVD  CHEST/LUNG: Clear to auscultation bilaterally; No rales, rhonchi, wheezing, or rubs. Unlabored respirations  HEART: irregular rate and rhythm; No murmurs, rubs, or gallops  ABDOMEN: Bowel sounds present  EXTREMITIES:  2+ Peripheral Pulses, brisk capillary refill. No clubbing, cyanosis, or edema  NERVOUS SYSTEM:  Alert & Oriented X3, speech clear. No deficits   MSK: FROM all 4 extremities, full and equal strength  SKIN: No rashes or lesions    Vital Signs Last 24 Hrs  T(C): 36.1 (17 Sep 2021 15:00), Max: 37.1 (16 Sep 2021 22:07)  T(F): 97 (17 Sep 2021 15:00), Max: 98.7 (16 Sep 2021 22:07)  HR: 118 (17 Sep 2021 20:21) (54 - 118)  BP: 129/67 (17 Sep 2021 20:21) (74/34 - 129/67)  BP(mean): 80 (17 Sep 2021 20:21) (43 - 87)  RR: 16 (17 Sep 2021 20:21) (14 - 21)  SpO2: 98% (17 Sep 2021 20:21) (97% - 100%)    MEDICATIONS  (STANDING):  folic acid 1 milliGRAM(s) Oral daily  heparin  Infusion.  Unit(s)/Hr (13 mL/Hr) IV Continuous <Continuous>  hydrocortisone sodium succinate Injectable 50 milliGRAM(s) IV Push every 6 hours  lactulose Syrup 10 Gram(s) Oral every 8 hours  midodrine 10 milliGRAM(s) Oral every 8 hours  pantoprazole    Tablet 40 milliGRAM(s) Oral before breakfast  rifAXIMin 550 milliGRAM(s) Oral two times a day  tamsulosin 0.4 milliGRAM(s) Oral at bedtime  thiamine 100 milliGRAM(s) Oral daily  tiotropium 18 MICROgram(s) Capsule 1 Capsule(s) Inhalation daily    MEDICATIONS  (PRN):  heparin   Injectable 6000 Unit(s) IV Push every 6 hours PRN For aPTT less than 40  heparin   Injectable 3000 Unit(s) IV Push every 6 hours PRN For aPTT between 40 - 57      Labs                   9.4    4.98  )-----------( 100      ( 17 Sep 2021 05:09 )             28.9   09-17    136  |  107  |  10  ----------------------------<  90  4.5   |  24  |  0.70    Ca    8.6      17 Sep 2021 05:12  Phos  3.4     09-17  Mg     2.0     09-17    TPro  5.8<L>  /  Alb  2.3<L>  /  TBili  0.7  /  DBili  x   /  AST  14<L>  /  ALT  8<L>  /  AlkPhos  50  09-17    < from: Xray Chest 1 View- PORTABLE-Urgent (Xray Chest 1 View- PORTABLE-Urgent .) (09.12.21 @ 16:47) >  Frontal expiratory view of the chest shows the heart to be similar in size. The lungs are clear and there is no evidence of pneumothorax nor pleural effusion.    IMPRESSION:  No active pulmonary disease.    Thank you for the courtesy of this referral.    < end of copied text >

## 2021-09-17 NOTE — CHART NOTE - NSCHARTNOTEFT_GEN_A_CORE
Called by RN to evaluate pt with bp of 74/34. Pt is denying any complaints.     Vital Signs Last 24 Hrs  T(C): 37.1 (16 Sep 2021 22:07), Max: 37.1 (16 Sep 2021 22:07)  T(F): 98.7 (16 Sep 2021 22:07), Max: 98.7 (16 Sep 2021 22:07)  HR: 67 (17 Sep 2021 02:55) (59 - 125)  BP: 74/34 (17 Sep 2021 02:55) (74/34 - 151/72)  BP(mean): 43 (17 Sep 2021 02:55) (43 - 96)  RR: 19 (17 Sep 2021 02:55) (14 - 23)  SpO2: 98% (17 Sep 2021 00:00) (96% - 100%)    Gen: awake and alert in NAD  Cardiac: S1 S2 regular  Lungs: CTA b/l  Abd: + BS, soft   Ext: no edema     55M with Phx of HTN, BPH, Gerd, alcoholic cirrhosis, ? chronic AF who was BIBA from [facility] rehab for dizziness, hypotension and near syncope.  Pt was on Spenser, he was weaned off pressors, now on Midodrine PO.     # Hypotension- asymptomatic   -1L NS bolus given  -Repeat labs  -C/w Midodrine   -c/w Hydrocortisone    Repeat bp after bolus is 92/60 with MPA of 70  -f/u labs   -c/e tele monitoring Called by RN to evaluate pt with bp of 74/34. Pt is denying any complaints. No CP, SOB, palpitation, dizziness or lightheadedness    Vital Signs Last 24 Hrs  T(C): 37.1 (16 Sep 2021 22:07), Max: 37.1 (16 Sep 2021 22:07)  T(F): 98.7 (16 Sep 2021 22:07), Max: 98.7 (16 Sep 2021 22:07)  HR: 67 (17 Sep 2021 02:55) (59 - 125)  BP: 74/34 (17 Sep 2021 02:55) (74/34 - 151/72)  BP(mean): 43 (17 Sep 2021 02:55) (43 - 96)  RR: 19 (17 Sep 2021 02:55) (14 - 23)  SpO2: 98% (17 Sep 2021 00:00) (96% - 100%)    Gen: awake and alert in NAD  Cardiac: S1 S2 regular  Lungs: CTA b/l  Abd: + BS, soft   Ext: no edema     55M with Phx of HTN, BPH, Gerd, alcoholic cirrhosis, ? chronic AF who was BIBA from [facility] rehab for dizziness, hypotension and near syncope.  Pt was on Spenser, he was weaned off pressors, now on Midodrine PO.     # Hypotension- asymptomatic   -1L NS bolus given  -Repeat CBC, CMP, lactate, blood cx, troponin  -C/w Midodrine   -c/w Hydrocortisone    Repeat bp after bolus is 94/62 with MPA of 70  -f/u labs   -c/e tele monitoring Called by RN to evaluate pt with bp of 74/34. Pt is denying any complaints. No CP, SOB, palpitation, dizziness or lightheadedness    Vital Signs Last 24 Hrs  T(C): 37.1 (16 Sep 2021 22:07), Max: 37.1 (16 Sep 2021 22:07)  T(F): 98.7 (16 Sep 2021 22:07), Max: 98.7 (16 Sep 2021 22:07)  HR: 67 (17 Sep 2021 02:55) (59 - 125)  BP: 74/34 (17 Sep 2021 02:55) (74/34 - 151/72)  BP(mean): 43 (17 Sep 2021 02:55) (43 - 96)  RR: 19 (17 Sep 2021 02:55) (14 - 23)  SpO2: 98% (17 Sep 2021 00:00) (96% - 100%)    Gen: awake and alert in NAD  Cardiac: S1 S2 regular  Lungs: CTA b/l  Abd: + BS, soft   Ext: no edema     55M with Phx of HTN, BPH, Gerd, alcoholic cirrhosis, ? chronic AF who was BIBA from [facility] rehab for dizziness, hypotension and near syncope. Pt was weaned off  Spenser, now on PO Midodrine and Hydrocortisone.     # Hypotension- asymptomatic   -1L NS bolus given  -Repeat CBC, CMP, lactate, blood cx, troponin  -C/w Midodrine   -c/w Hydrocortisone    Repeat bp after bolus is 94/62 with MPA of 70  -f/u labs   -c/w tele monitoring  -Critical care consult if further drop in bp

## 2021-09-17 NOTE — PROGRESS NOTE ADULT - ASSESSMENT
55 yr old male with above history BIBA from [facility] rehab for dizziness, hypotension and near syncope.      A/P:  Near syncope, bradycardia, hypotension/shock  Continue tele monitoring and CCU care  Off pressors, now on Midodrine PO try to lower dose if BP tolerates  Had stress dose steroids today  Trial of low dose BB resulted in marked hypotension  Atrial fib- continue IV Heparin for anticoagulation.  Keep K>4.0, Mg>2.0  LVEF 65-70%, +1MR, +2TR  Will continue to follow  Discussed plan with Dr. Mckinley, pt, Dr. Weems, RN

## 2021-09-18 LAB
ALBUMIN SERPL ELPH-MCNC: 2.4 G/DL — LOW (ref 3.3–5)
ALP SERPL-CCNC: 47 U/L — SIGNIFICANT CHANGE UP (ref 40–120)
ALT FLD-CCNC: 9 U/L — LOW (ref 12–78)
ANION GAP SERPL CALC-SCNC: 5 MMOL/L — SIGNIFICANT CHANGE UP (ref 5–17)
APTT BLD: 73.1 SEC — HIGH (ref 27.5–35.5)
AST SERPL-CCNC: 14 U/L — LOW (ref 15–37)
BILIRUB SERPL-MCNC: 0.7 MG/DL — SIGNIFICANT CHANGE UP (ref 0.2–1.2)
BUN SERPL-MCNC: 11 MG/DL — SIGNIFICANT CHANGE UP (ref 7–23)
CALCIUM SERPL-MCNC: 8.6 MG/DL — SIGNIFICANT CHANGE UP (ref 8.5–10.1)
CHLORIDE SERPL-SCNC: 106 MMOL/L — SIGNIFICANT CHANGE UP (ref 96–108)
CO2 SERPL-SCNC: 24 MMOL/L — SIGNIFICANT CHANGE UP (ref 22–31)
CREAT SERPL-MCNC: 0.72 MG/DL — SIGNIFICANT CHANGE UP (ref 0.5–1.3)
CULTURE RESULTS: SIGNIFICANT CHANGE UP
CULTURE RESULTS: SIGNIFICANT CHANGE UP
GLUCOSE SERPL-MCNC: 124 MG/DL — HIGH (ref 70–99)
HCT VFR BLD CALC: 28.9 % — LOW (ref 39–50)
HGB BLD-MCNC: 9.6 G/DL — LOW (ref 13–17)
INR BLD: 1.33 RATIO — HIGH (ref 0.88–1.16)
MCHC RBC-ENTMCNC: 30.3 PG — SIGNIFICANT CHANGE UP (ref 27–34)
MCHC RBC-ENTMCNC: 33.2 GM/DL — SIGNIFICANT CHANGE UP (ref 32–36)
MCV RBC AUTO: 91.2 FL — SIGNIFICANT CHANGE UP (ref 80–100)
PLATELET # BLD AUTO: 111 K/UL — LOW (ref 150–400)
POTASSIUM SERPL-MCNC: 4.4 MMOL/L — SIGNIFICANT CHANGE UP (ref 3.5–5.3)
POTASSIUM SERPL-SCNC: 4.4 MMOL/L — SIGNIFICANT CHANGE UP (ref 3.5–5.3)
PROT SERPL-MCNC: 5.8 GM/DL — LOW (ref 6–8.3)
PROTHROM AB SERPL-ACNC: 15.4 SEC — HIGH (ref 10.6–13.6)
RBC # BLD: 3.17 M/UL — LOW (ref 4.2–5.8)
RBC # FLD: 14.3 % — SIGNIFICANT CHANGE UP (ref 10.3–14.5)
SODIUM SERPL-SCNC: 135 MMOL/L — SIGNIFICANT CHANGE UP (ref 135–145)
SPECIMEN SOURCE: SIGNIFICANT CHANGE UP
SPECIMEN SOURCE: SIGNIFICANT CHANGE UP
WBC # BLD: 5.78 K/UL — SIGNIFICANT CHANGE UP (ref 3.8–10.5)
WBC # FLD AUTO: 5.78 K/UL — SIGNIFICANT CHANGE UP (ref 3.8–10.5)

## 2021-09-18 PROCEDURE — 99233 SBSQ HOSP IP/OBS HIGH 50: CPT | Mod: GC

## 2021-09-18 RX ADMIN — PANTOPRAZOLE SODIUM 40 MILLIGRAM(S): 20 TABLET, DELAYED RELEASE ORAL at 06:12

## 2021-09-18 RX ADMIN — Medication 1 MILLIGRAM(S): at 11:31

## 2021-09-18 RX ADMIN — Medication 50 MILLIGRAM(S): at 17:34

## 2021-09-18 RX ADMIN — MIDODRINE HYDROCHLORIDE 10 MILLIGRAM(S): 2.5 TABLET ORAL at 14:16

## 2021-09-18 RX ADMIN — MIDODRINE HYDROCHLORIDE 10 MILLIGRAM(S): 2.5 TABLET ORAL at 21:02

## 2021-09-18 RX ADMIN — Medication 50 MILLIGRAM(S): at 23:47

## 2021-09-18 RX ADMIN — TAMSULOSIN HYDROCHLORIDE 0.4 MILLIGRAM(S): 0.4 CAPSULE ORAL at 21:02

## 2021-09-18 RX ADMIN — MIDODRINE HYDROCHLORIDE 10 MILLIGRAM(S): 2.5 TABLET ORAL at 06:14

## 2021-09-18 RX ADMIN — LACTULOSE 10 GRAM(S): 10 SOLUTION ORAL at 06:13

## 2021-09-18 RX ADMIN — LACTULOSE 10 GRAM(S): 10 SOLUTION ORAL at 14:16

## 2021-09-18 RX ADMIN — Medication 100 MILLIGRAM(S): at 11:31

## 2021-09-18 RX ADMIN — Medication 50 MILLIGRAM(S): at 11:32

## 2021-09-18 RX ADMIN — Medication 50 MILLIGRAM(S): at 06:11

## 2021-09-18 RX ADMIN — LACTULOSE 10 GRAM(S): 10 SOLUTION ORAL at 21:02

## 2021-09-18 RX ADMIN — TIOTROPIUM BROMIDE 1 CAPSULE(S): 18 CAPSULE ORAL; RESPIRATORY (INHALATION) at 08:48

## 2021-09-18 RX ADMIN — HEPARIN SODIUM 900 UNIT(S)/HR: 5000 INJECTION INTRAVENOUS; SUBCUTANEOUS at 09:51

## 2021-09-18 NOTE — PROGRESS NOTE ADULT - SUBJECTIVE AND OBJECTIVE BOX
55M hx HTN BPH Gerd chronic a fib ??? on pradaxa.  GERD alcoholic cirrhosis     First time in [place].  His hx is not really reliable and the SNF records are sparse.  States he was at [facility] in July for a few weeks after he went into alcohol withdrawal.  He does not have any recollection of his time at [facility].      He was sent to [facility] SNF for physical rehab.      In USOH until this afternoon when her felt dizzy.  He states than he has not been eating or drinking much.    Admit with MELISSA hypotension and junctional nena cardia.  Cultured given fluids abx and started on nor-epi  noted with mild hyponatremia  He is on diuretics nadolol and digoxin.    9/17/2021  Subjective- Pt was seen by his bedside, awake oriented to place and person. Presently, he have no complaints. Denies chest pain, sob, nausea or vomiting.  9-  Subjective-Pt was seen by his bedside. awake and oriented to place and time. Presently, pt have no complaints, feeling much better than before. He wants to leave the hospital by Monday  to join rehab center.    REVIEW OF SYSTEMS:    CONSTITUTIONAL: no fevers or chills  EYES/ENT: No visual changes;  No vertigo or throat pain   NECK: No pain or stiffness  RESPIRATORY: No cough, wheezing, hemoptysis; No shortness of breath  CARDIOVASCULAR: No chest pain or palpitations  GASTROINTESTINAL: No abdominal or epigastric pain. No nausea, vomiting, or hematemesis; No diarrhea or constipation. No melena or hematochezia.  GENITOURINARY: No dysuria, frequency or hematuria  NEUROLOGICAL: No numbness or weakness  SKIN: No itching, rashes    Physical examination  GENERAL: NAD, lying in bed comfortably  HEAD:  Atraumatic, Normocephalic  NECK: Supple, No JVD  CHEST/LUNG: Clear to auscultation bilaterally; No rales, rhonchi, wheezing, or rubs. Unlabored respirations  HEART: irregular rate and rhythm; No murmurs, rubs, or gallops  ABDOMEN: Bowel sounds present  EXTREMITIES:  2+ Peripheral Pulses, brisk capillary refill. No clubbing, cyanosis, or edema  NERVOUS SYSTEM:  Alert & Oriented X3, speech clear. No deficits   MSK: FROM all 4 extremities, full and equal strength  SKIN: No rashes or lesions    Vital Signs Last 24 Hrs  T(C): 36.3 (18 Sep 2021 18:26), Max: 36.4 (18 Sep 2021 04:52)  T(F): 97.3 (18 Sep 2021 18:26), Max: 97.5 (18 Sep 2021 04:52)  HR: 75 (18 Sep 2021 19:00) (59 - 118)  BP: 100/77 (18 Sep 2021 19:00) (88/58 - 130/69)  BP(mean): 83 (18 Sep 2021 19:00) (57 - 97)  RR: 23 (18 Sep 2021 19:00) (10 - 25)  SpO2: 100% (18 Sep 2021 19:00) (94% - 100%)    MEDICATIONS  (STANDING):  folic acid 1 milliGRAM(s) Oral daily  heparin  Infusion.  Unit(s)/Hr (13 mL/Hr) IV Continuous <Continuous>  hydrocortisone sodium succinate Injectable 50 milliGRAM(s) IV Push every 6 hours  lactulose Syrup 10 Gram(s) Oral every 8 hours  midodrine 10 milliGRAM(s) Oral every 8 hours  pantoprazole    Tablet 40 milliGRAM(s) Oral before breakfast  rifAXIMin 550 milliGRAM(s) Oral two times a day  tamsulosin 0.4 milliGRAM(s) Oral at bedtime  thiamine 100 milliGRAM(s) Oral daily  tiotropium 18 MICROgram(s) Capsule 1 Capsule(s) Inhalation daily    MEDICATIONS  (PRN):  heparin   Injectable 6000 Unit(s) IV Push every 6 hours PRN For aPTT less than 40  heparin   Injectable 3000 Unit(s) IV Push every 6 hours PRN For aPTT between 40 - 57      Labs                          9.6    5.78  )-----------( 111      ( 18 Sep 2021 05:20 )             28.9   09-18    135  |  106  |  11  ----------------------------<  124<H>  4.4   |  24  |  0.72    Ca    8.6      18 Sep 2021 05:20  Phos  3.4     09-17  Mg     2.0     09-17    TPro  5.8<L>  /  Alb  2.4<L>  /  TBili  0.7  /  DBili  x   /  AST  14<L>  /  ALT  9<L>  /  AlkPhos  47  09-18                   9.4    4.98  )-----------( 100      ( 17 Sep 2021 05:09 )             28.9   09-17    136  |  107  |  10  ----------------------------<  90  4.5   |  24  |  0.70    Ca    8.6      17 Sep 2021 05:12  Phos  3.4     09-17  Mg     2.0     09-17    TPro  5.8<L>  /  Alb  2.3<L>  /  TBili  0.7  /  DBili  x   /  AST  14<L>  /  ALT  8<L>  /  AlkPhos  50  09-17    < from: Xray Chest 1 View- PORTABLE-Urgent (Xray Chest 1 View- PORTABLE-Urgent .) (09.12.21 @ 16:47) >  Frontal expiratory view of the chest shows the heart to be similar in size. The lungs are clear and there is no evidence of pneumothorax nor pleural effusion.    IMPRESSION:  No active pulmonary disease.    Thank you for the courtesy of this referral.    < end of copied text >

## 2021-09-18 NOTE — PROGRESS NOTE ADULT - ASSESSMENT
55 yr old male with above history of dizziness, hypotension and near syncope.     #Bradycardia, hypotension/shock  Possibly due to sotalol. Pt is in ccu under constant telemetry  hydrocortisone sodium succinate Injectable 50 milliGRAM(s) IV Push every 6 hours  midodrine 10 milliGRAM(s) Oral every 8 hours  betablocker stopped due to induced hypotension and bradycardia    #?Adrenal insufficiency  Pt's cortisol level is low.  hydrocortisone sodium succinate Injectable 50 milliGRAM(s) IV Push every 6 hours    #A-Fib-  Stable and he is on heparin    #Liver cirrhosis/Hepatic encephalopathy  He is on rifaximin 550 milliGRAM(s) Oral two times a day    #GERD  Pantoprazole    We will continue to monitor him closely. if pts bp and hr are normal for the next 24 hrs we will consider to D/C him from our care  Case was discussed with

## 2021-09-19 LAB
ALBUMIN SERPL ELPH-MCNC: 2.4 G/DL — LOW (ref 3.3–5)
ALP SERPL-CCNC: 48 U/L — SIGNIFICANT CHANGE UP (ref 40–120)
ALT FLD-CCNC: 9 U/L — LOW (ref 12–78)
ANION GAP SERPL CALC-SCNC: 7 MMOL/L — SIGNIFICANT CHANGE UP (ref 5–17)
APTT BLD: 62.5 SEC — HIGH (ref 27.5–35.5)
AST SERPL-CCNC: 14 U/L — LOW (ref 15–37)
BILIRUB SERPL-MCNC: 0.6 MG/DL — SIGNIFICANT CHANGE UP (ref 0.2–1.2)
BUN SERPL-MCNC: 15 MG/DL — SIGNIFICANT CHANGE UP (ref 7–23)
CALCIUM SERPL-MCNC: 8.7 MG/DL — SIGNIFICANT CHANGE UP (ref 8.5–10.1)
CHLORIDE SERPL-SCNC: 106 MMOL/L — SIGNIFICANT CHANGE UP (ref 96–108)
CO2 SERPL-SCNC: 23 MMOL/L — SIGNIFICANT CHANGE UP (ref 22–31)
CREAT SERPL-MCNC: 0.67 MG/DL — SIGNIFICANT CHANGE UP (ref 0.5–1.3)
GLUCOSE SERPL-MCNC: 117 MG/DL — HIGH (ref 70–99)
HCT VFR BLD CALC: 29.1 % — LOW (ref 39–50)
HGB BLD-MCNC: 9.4 G/DL — LOW (ref 13–17)
MCHC RBC-ENTMCNC: 29.7 PG — SIGNIFICANT CHANGE UP (ref 27–34)
MCHC RBC-ENTMCNC: 32.3 GM/DL — SIGNIFICANT CHANGE UP (ref 32–36)
MCV RBC AUTO: 91.8 FL — SIGNIFICANT CHANGE UP (ref 80–100)
PLATELET # BLD AUTO: 111 K/UL — LOW (ref 150–400)
POTASSIUM SERPL-MCNC: 3.8 MMOL/L — SIGNIFICANT CHANGE UP (ref 3.5–5.3)
POTASSIUM SERPL-SCNC: 3.8 MMOL/L — SIGNIFICANT CHANGE UP (ref 3.5–5.3)
PROT SERPL-MCNC: 5.7 GM/DL — LOW (ref 6–8.3)
RBC # BLD: 3.17 M/UL — LOW (ref 4.2–5.8)
RBC # FLD: 14.8 % — HIGH (ref 10.3–14.5)
SODIUM SERPL-SCNC: 136 MMOL/L — SIGNIFICANT CHANGE UP (ref 135–145)
WBC # BLD: 7.01 K/UL — SIGNIFICANT CHANGE UP (ref 3.8–10.5)
WBC # FLD AUTO: 7.01 K/UL — SIGNIFICANT CHANGE UP (ref 3.8–10.5)

## 2021-09-19 PROCEDURE — 99232 SBSQ HOSP IP/OBS MODERATE 35: CPT | Mod: GC

## 2021-09-19 RX ORDER — DABIGATRAN ETEXILATE MESYLATE 150 MG/1
150 CAPSULE ORAL EVERY 12 HOURS
Refills: 0 | Status: DISCONTINUED | OUTPATIENT
Start: 2021-09-19 | End: 2021-09-27

## 2021-09-19 RX ORDER — MIDODRINE HYDROCHLORIDE 2.5 MG/1
15 TABLET ORAL THREE TIMES A DAY
Refills: 0 | Status: DISCONTINUED | OUTPATIENT
Start: 2021-09-19 | End: 2021-09-27

## 2021-09-19 RX ADMIN — LACTULOSE 10 GRAM(S): 10 SOLUTION ORAL at 16:04

## 2021-09-19 RX ADMIN — MIDODRINE HYDROCHLORIDE 10 MILLIGRAM(S): 2.5 TABLET ORAL at 06:16

## 2021-09-19 RX ADMIN — Medication 50 MILLIGRAM(S): at 11:44

## 2021-09-19 RX ADMIN — Medication 1 MILLIGRAM(S): at 11:44

## 2021-09-19 RX ADMIN — DABIGATRAN ETEXILATE MESYLATE 150 MILLIGRAM(S): 150 CAPSULE ORAL at 22:28

## 2021-09-19 RX ADMIN — Medication 50 MILLIGRAM(S): at 17:04

## 2021-09-19 RX ADMIN — Medication 100 MILLIGRAM(S): at 11:44

## 2021-09-19 RX ADMIN — TIOTROPIUM BROMIDE 1 CAPSULE(S): 18 CAPSULE ORAL; RESPIRATORY (INHALATION) at 10:14

## 2021-09-19 RX ADMIN — MIDODRINE HYDROCHLORIDE 15 MILLIGRAM(S): 2.5 TABLET ORAL at 17:04

## 2021-09-19 RX ADMIN — TAMSULOSIN HYDROCHLORIDE 0.4 MILLIGRAM(S): 0.4 CAPSULE ORAL at 22:28

## 2021-09-19 RX ADMIN — Medication 50 MILLIGRAM(S): at 22:29

## 2021-09-19 RX ADMIN — Medication 50 MILLIGRAM(S): at 06:16

## 2021-09-19 RX ADMIN — LACTULOSE 10 GRAM(S): 10 SOLUTION ORAL at 22:28

## 2021-09-19 RX ADMIN — LACTULOSE 10 GRAM(S): 10 SOLUTION ORAL at 06:16

## 2021-09-19 RX ADMIN — PANTOPRAZOLE SODIUM 40 MILLIGRAM(S): 20 TABLET, DELAYED RELEASE ORAL at 06:15

## 2021-09-19 RX ADMIN — HEPARIN SODIUM 900 UNIT(S)/HR: 5000 INJECTION INTRAVENOUS; SUBCUTANEOUS at 11:40

## 2021-09-19 NOTE — PROGRESS NOTE ADULT - SUBJECTIVE AND OBJECTIVE BOX
HPI:  55M hx HTN BPH Gerd chronic a fib ??? on pradaxa.  GERD alcoholic cirrhosis     First time in [place].  His hx is not really reliable and the SNF records are sparse.  States he was at [facility] in July for a few weeks after he went into alcohol withdrawal.  He does not have any recollection of his time at [facility].      He was sent to [facility] for physical rehab.      In [facility] until this afternoon when her felt dizzy.  He states than he has not been eating or drinking much.    Admit with MELISSA hypotension and junctional nena cardia.  Cultured given fluids abx and started on nor-epi  noted with mild hyponatremia  He is on a bunch of diuretics nadolol and digoxin.  ,          (12 Sep 2021 23:06)      MEDICATIONS  (STANDING):  dabigatran 150 milliGRAM(s) Oral every 12 hours  folic acid 1 milliGRAM(s) Oral daily  heparin  Infusion.  Unit(s)/Hr (13 mL/Hr) IV Continuous <Continuous>  hydrocortisone sodium succinate Injectable 50 milliGRAM(s) IV Push every 6 hours  lactulose Syrup 10 Gram(s) Oral every 8 hours  midodrine. 15 milliGRAM(s) Oral three times a day  pantoprazole    Tablet 40 milliGRAM(s) Oral before breakfast  rifAXIMin 550 milliGRAM(s) Oral two times a day  tamsulosin 0.4 milliGRAM(s) Oral at bedtime  thiamine 100 milliGRAM(s) Oral daily  tiotropium 18 MICROgram(s) Capsule 1 Capsule(s) Inhalation daily    MEDICATIONS  (PRN):  heparin   Injectable 6000 Unit(s) IV Push every 6 hours PRN For aPTT less than 40  heparin   Injectable 3000 Unit(s) IV Push every 6 hours PRN For aPTT between 40 - 57      Vital Signs Last 24 Hrs  T(C): 36.4 (19 Sep 2021 10:39), Max: 36.4 (19 Sep 2021 10:39)  T(F): 97.6 (19 Sep 2021 10:39), Max: 97.6 (19 Sep 2021 10:39)  HR: 73 (19 Sep 2021 17:38) (67 - 96)  BP: 123/77 (19 Sep 2021 17:38) (84/47 - 123/77)  BP(mean): 86 (19 Sep 2021 17:38) (56 - 89)  RR: 19 (19 Sep 2021 17:38) (13 - 24)  SpO2: 98% (19 Sep 2021 17:38) (96% - 100%)    GEN: NAD, comfortable, resting in bed  HEENT: NC/AT, EOMI, PERRLA, MMM, clear conjunctiva and sclera, normal hearing, no nasal discharge, throat clear, no thrush, normal dentition.   NECK: supple, no JVD, no LAD, no thyromegaly  BACK:  ROM intact, no spinal/paraspinal tenderness  CV: S1S2, RRR, no mumur  RESP: good air movement, CTABL, no rales, rhonchi or wheezing, respirations unlabored  ABD: +BS, soft, ND, NT, no guarding, no rigidity, no HSM  EXT: +2 radial and pedal pulses, no edema, no calve tenderness  SKIN: No visible Rashes or Ulcers  MSK: ROM intact in all extremities  NEURO:  sensory and CN 2-12 Grossly intact, no motor deficits, no, saddle anesthesia, AOx3  PSYCH: normal behavior         LABS:                          9.4    7.01  )-----------( 111      ( 19 Sep 2021 06:33 )             29.1     19 Sep 2021 06:33    136    |  106    |  15     ----------------------------<  117    3.8     |  23     |  0.67     Ca    8.7        19 Sep 2021 06:33    TPro  5.7    /  Alb  2.4    /  TBili  0.6    /  DBili  x      /  AST  14     /  ALT  9      /  AlkPhos  48     19 Sep 2021 06:33    LIVER FUNCTIONS - ( 19 Sep 2021 06:33 )  Alb: 2.4 g/dL / Pro: 5.7 gm/dL / ALK PHOS: 48 U/L / ALT: 9 U/L / AST: 14 U/L / GGT: x           PT/INR - ( 18 Sep 2021 05:20 )   PT: 15.4 sec;   INR: 1.33 ratio         PTT - ( 19 Sep 2021 06:33 )  PTT:62.5 sec  CAPILLARY BLOOD GLUCOSE                RADIOLOGY:         HPI:  55M hx HTN BPH Gerd chronic a fib ??? on pradaxa.  GERD alcoholic cirrhosis     FirSt time in [place].  His hx is not really reliable and the SNF records are sparse.  States he was at [facility] in July for a few weeks after he went into alcohol withdrawal.  He does not have any recollection of his time at [facility].    He was sent to [facility] for physical rehab.    In [facility] until this afternoon when her felt dizzy.  He states than he has not been eating or drinking much.    Admit with MELISSA hypotension and junctional nena cardia.  Cultured given fluids abx and started on nor-epi  noted with mild hyponatremia  He is on a bunch of diuretics nadolol and digoxin.  ,     SUBJECTIVE:  Pt is evaluated at bedside and found NAD and alert. Pt reports feel well and denies chest pain, palpitations , dizziness. Pt admitted with hypotension and bradycardia that per patient is not new and have been having this problem for years. Pt  with on/off episodes of HR 160s with exertion.  No other complaints reported.       MEDICATIONS  (STANDING):  dabigatran 150 milliGRAM(s) Oral every 12 hours  folic acid 1 milliGRAM(s) Oral daily  heparin  Infusion.  Unit(s)/Hr (13 mL/Hr) IV Continuous <Continuous>  hydrocortisone sodium succinate Injectable 50 milliGRAM(s) IV Push every 6 hours  lactulose Syrup 10 Gram(s) Oral every 8 hours  midodrine. 15 milliGRAM(s) Oral three times a day  pantoprazole    Tablet 40 milliGRAM(s) Oral before breakfast  rifAXIMin 550 milliGRAM(s) Oral two times a day  tamsulosin 0.4 milliGRAM(s) Oral at bedtime  thiamine 100 milliGRAM(s) Oral daily  tiotropium 18 MICROgram(s) Capsule 1 Capsule(s) Inhalation daily    MEDICATIONS  (PRN):  heparin   Injectable 6000 Unit(s) IV Push every 6 hours PRN For aPTT less than 40  heparin   Injectable 3000 Unit(s) IV Push every 6 hours PRN For aPTT between 40 - 57      Vital Signs Last 24 Hrs  T(C): 36.4 (19 Sep 2021 10:39), Max: 36.4 (19 Sep 2021 10:39)  T(F): 97.6 (19 Sep 2021 10:39), Max: 97.6 (19 Sep 2021 10:39)  HR: 73 (19 Sep 2021 17:38) (67 - 96)  BP: 123/77 (19 Sep 2021 17:38) (84/47 - 123/77)  BP(mean): 86 (19 Sep 2021 17:38) (56 - 89)  RR: 19 (19 Sep 2021 17:38) (13 - 24)  SpO2: 98% (19 Sep 2021 17:38) (96% - 100%)    GEN: NAD, comfortable, resting in bed  HEENT: NC/AT, EOMI, PERRLA, MMM, clear conjunctiva and sclera, normal hearing, no nasal discharge, throat clear, no thrush, normal dentition.   NECK: supple, no JVD, no LAD, no thyromegaly  BACK:  ROM intact, no spinal/paraspinal tenderness  CV: irregular, no murmur or gallops   RESP: good air movement, CTABL, no rales, rhonchi or wheezing, respirations unlabored  ABD: +BS, soft, ND, NT, no guarding, no rigidity, no HSM  EXT: +2 radial and pedal pulses, no edema, no calve tenderness  SKIN: No visible Rashes or Ulcers  MSK: ROM intact in all extremities  NEURO:  sensory and CN 2-12 Grossly intact, no motor deficits, no, saddle anesthesia, AOx3      LABS:                          9.4    7.01  )-----------( 111      ( 19 Sep 2021 06:33 )             29.1     19 Sep 2021 06:33    136    |  106    |  15     ----------------------------<  117    3.8     |  23     |  0.67     Ca    8.7        19 Sep 2021 06:33    TPro  5.7    /  Alb  2.4    /  TBili  0.6    /  DBili  x      /  AST  14     /  ALT  9      /  AlkPhos  48     19 Sep 2021 06:33    LIVER FUNCTIONS - ( 19 Sep 2021 06:33 )  Alb: 2.4 g/dL / Pro: 5.7 gm/dL / ALK PHOS: 48 U/L / ALT: 9 U/L / AST: 14 U/L / GGT: x           PT/INR - ( 18 Sep 2021 05:20 )   PT: 15.4 sec;   INR: 1.33 ratio         PTT - ( 19 Sep 2021 06:33 )  PTT:62.5 sec  CAPILLARY BLOOD GLUCOSE      RADIOLOGY:    US DOPPLERS:  RIGHT:  Normal compressibility of the RIGHT common femoral, femoral and popliteal veins.  Doppler examination shows normal spontaneous and phasic flow.  No RIGHT calf vein thrombosis is detected. Peroneal, soleal, gastrocnemius vein not visualized.    LEFT:  Normal compressibility of the LEFT common femoral, femoral and popliteal veins.  Doppler examination shows normal spontaneous and phasic flow.  No LEFT calf vein thrombosis is detected. Peroneal and soleal vein not visualized.    IMPRESSION:  No evidence of deep venous thrombosis in either lower extremity.

## 2021-09-19 NOTE — PROGRESS NOTE ADULT - ASSESSMENT
55M hx HTN BPH, GERD, chronic a fib  on pradaxa, alcoholic cirrhosis, EtOH abuse came to ED [facility] from [facility] rehab for evaluation of  dizziness, hypotension and near syncope.      #Near syncope complicated by shock   - Hypotensio econdary to BB treatment and possible adrenal insufficiency     Possibly due to sotalol. Pt is in ccu under constant telemetry  hydrocortisone sodium succinate Injectable 50 milliGRAM(s) IV Push every 6 hours  midodrine 10 milliGRAM(s) Oral every 8 hours  betablocker stopped due to induced hypotension and bradycardia    #?Adrenal insufficiency  Pt's cortisol level is low.  hydrocortisone sodium succinate Injectable 50 milliGRAM(s) IV Push every 6 hours    #A-Fib-  Stable and he is on heparin    #Liver cirrhosis/Hepatic encephalopathy  He is on rifaximin 550 milliGRAM(s) Oral two times a day    #GERD  Pantoprazole    We will continue to monitor him closely. if pts bp and hr are normal for the next 24 hrs we will consider to D/C him from our care  Case was discussed with    55M hx HTN BPH, GERD, chronic a fib  on pradaxa, alcoholic cirrhosis, EtOH abuse [facility] for evaluation of  dizziness, hypotension and near syncope.      #Near syncope complicated by shock   - Hypotension at admission could be secondary to BB treatment and possible adrenal insufficiency   - s/p treatment with levophed until 9/16/21  - Cortisol 11 ( borderline)   - BP today with -123  - Continue hydrocortisone sodium succinate Injectable 50 mg IV every 6 hours  - Increase Midodrine to 15 mg PO TID   - On propanolol 20 mg PO BID at home ( confirmed by pharmacy)   - Unable to continue BB due to induced hypotension and bradycardia  - EP recommendations appreciated     #Afib   - On Propanolol 20 mg PO BID at home, hold now due to hypotension   - Pt with Afib Hr 70-90 at rest but had multiple episode of HR > 160 at exertion   - Unable to tolerated BB during this admission   - Digoxin was hold at admission   - EP tried to be contacted for further recommendations but I was unable to contact them   - Pt  could be a candidate for pacemaker???   - on Heparin ggt. Discontinue today   - Start Pradaxa 150 mg PO BID     #Liver cirrhosis  - Secondary to EtOH abuse   - Continue rifaximin 550 mg PO BID   - Liver enzymes in the lower side   - Avoid nephrotoxic medications     #Thrombocytopenia  - Plts 111   - 183 on sep 12   - No signs of bleeding   - Mostly secondary to liver cirrhosis   - d/c heparin ggt     #GERD  - Continue Pantoprazole    #Dispo   - F/U EP for further recommendation If no intervention recommended start  D/C jase     Case was discussed with Dr Elmore

## 2021-09-19 NOTE — CHART NOTE - NSCHARTNOTEFT_GEN_A_CORE
Pt seen and examined with house staff.  Plan formulated and reviewed on rounds     Briefly,  54 y/o male with Cirrhosis, Chronic asymptomatic hypotension and Chronic AFib on metoprolol and Dig admitted with near syncope found to be bradycardiac and hypotension.  This is second episode of syncope in 2 months.  He was initially admitted to CCU for pressors support.  ?? Adrenal insuff on steroids.  ROS o/w negative   No events overnight     Awake and Alert  NAD  /40-70s      60-70s AFib  No fevers    Grossly non focal     Labs reviewed    Normal WBC  BCx NTD    Increase midodrine to 15 q8  Cont with IV HC  Switch to DOAC if no procedures are planned  ?? use for rate control when he goes tachy  EP follow up    Tele

## 2021-09-20 LAB
ALBUMIN SERPL ELPH-MCNC: 2.5 G/DL — LOW (ref 3.3–5)
ALP SERPL-CCNC: 48 U/L — SIGNIFICANT CHANGE UP (ref 40–120)
ALT FLD-CCNC: 11 U/L — LOW (ref 12–78)
ANION GAP SERPL CALC-SCNC: 4 MMOL/L — LOW (ref 5–17)
APTT BLD: 31.7 SEC — SIGNIFICANT CHANGE UP (ref 27.5–35.5)
AST SERPL-CCNC: 18 U/L — SIGNIFICANT CHANGE UP (ref 15–37)
BASOPHILS # BLD AUTO: 0 K/UL — SIGNIFICANT CHANGE UP (ref 0–0.2)
BASOPHILS NFR BLD AUTO: 0 % — SIGNIFICANT CHANGE UP (ref 0–2)
BILIRUB SERPL-MCNC: 0.6 MG/DL — SIGNIFICANT CHANGE UP (ref 0.2–1.2)
BUN SERPL-MCNC: 15 MG/DL — SIGNIFICANT CHANGE UP (ref 7–23)
CALCIUM SERPL-MCNC: 8.8 MG/DL — SIGNIFICANT CHANGE UP (ref 8.5–10.1)
CHLORIDE SERPL-SCNC: 106 MMOL/L — SIGNIFICANT CHANGE UP (ref 96–108)
CO2 SERPL-SCNC: 27 MMOL/L — SIGNIFICANT CHANGE UP (ref 22–31)
CREAT SERPL-MCNC: 0.77 MG/DL — SIGNIFICANT CHANGE UP (ref 0.5–1.3)
EOSINOPHIL # BLD AUTO: 0 K/UL — SIGNIFICANT CHANGE UP (ref 0–0.5)
EOSINOPHIL NFR BLD AUTO: 0 % — SIGNIFICANT CHANGE UP (ref 0–6)
GLUCOSE SERPL-MCNC: 112 MG/DL — HIGH (ref 70–99)
HCT VFR BLD CALC: 29.7 % — LOW (ref 39–50)
HGB BLD-MCNC: 9.7 G/DL — LOW (ref 13–17)
IMM GRANULOCYTES NFR BLD AUTO: 0.5 % — SIGNIFICANT CHANGE UP (ref 0–1.5)
LYMPHOCYTES # BLD AUTO: 0.88 K/UL — LOW (ref 1–3.3)
LYMPHOCYTES # BLD AUTO: 14.4 % — SIGNIFICANT CHANGE UP (ref 13–44)
MCHC RBC-ENTMCNC: 30.3 PG — SIGNIFICANT CHANGE UP (ref 27–34)
MCHC RBC-ENTMCNC: 32.7 GM/DL — SIGNIFICANT CHANGE UP (ref 32–36)
MCV RBC AUTO: 92.8 FL — SIGNIFICANT CHANGE UP (ref 80–100)
MONOCYTES # BLD AUTO: 0.35 K/UL — SIGNIFICANT CHANGE UP (ref 0–0.9)
MONOCYTES NFR BLD AUTO: 5.7 % — SIGNIFICANT CHANGE UP (ref 2–14)
NEUTROPHILS # BLD AUTO: 4.83 K/UL — SIGNIFICANT CHANGE UP (ref 1.8–7.4)
NEUTROPHILS NFR BLD AUTO: 79.4 % — HIGH (ref 43–77)
PLATELET # BLD AUTO: 113 K/UL — LOW (ref 150–400)
POTASSIUM SERPL-MCNC: 4.4 MMOL/L — SIGNIFICANT CHANGE UP (ref 3.5–5.3)
POTASSIUM SERPL-SCNC: 4.4 MMOL/L — SIGNIFICANT CHANGE UP (ref 3.5–5.3)
PROT SERPL-MCNC: 5.7 GM/DL — LOW (ref 6–8.3)
RBC # BLD: 3.2 M/UL — LOW (ref 4.2–5.8)
RBC # FLD: 14.9 % — HIGH (ref 10.3–14.5)
SODIUM SERPL-SCNC: 137 MMOL/L — SIGNIFICANT CHANGE UP (ref 135–145)
WBC # BLD: 6.09 K/UL — SIGNIFICANT CHANGE UP (ref 3.8–10.5)
WBC # FLD AUTO: 6.09 K/UL — SIGNIFICANT CHANGE UP (ref 3.8–10.5)

## 2021-09-20 PROCEDURE — 99232 SBSQ HOSP IP/OBS MODERATE 35: CPT | Mod: GC

## 2021-09-20 RX ORDER — SODIUM CHLORIDE 9 MG/ML
250 INJECTION INTRAMUSCULAR; INTRAVENOUS; SUBCUTANEOUS ONCE
Refills: 0 | Status: COMPLETED | OUTPATIENT
Start: 2021-09-20 | End: 2021-09-20

## 2021-09-20 RX ORDER — LACTULOSE 10 G/15ML
10 SOLUTION ORAL DAILY
Refills: 0 | Status: DISCONTINUED | OUTPATIENT
Start: 2021-09-20 | End: 2021-09-27

## 2021-09-20 RX ADMIN — LACTULOSE 10 GRAM(S): 10 SOLUTION ORAL at 05:44

## 2021-09-20 RX ADMIN — Medication 50 MILLIGRAM(S): at 05:46

## 2021-09-20 RX ADMIN — PANTOPRAZOLE SODIUM 40 MILLIGRAM(S): 20 TABLET, DELAYED RELEASE ORAL at 05:46

## 2021-09-20 RX ADMIN — Medication 50 MILLIGRAM(S): at 17:26

## 2021-09-20 RX ADMIN — Medication 100 MILLIGRAM(S): at 09:28

## 2021-09-20 RX ADMIN — Medication 50 MILLIGRAM(S): at 23:18

## 2021-09-20 RX ADMIN — DABIGATRAN ETEXILATE MESYLATE 150 MILLIGRAM(S): 150 CAPSULE ORAL at 21:51

## 2021-09-20 RX ADMIN — MIDODRINE HYDROCHLORIDE 15 MILLIGRAM(S): 2.5 TABLET ORAL at 11:31

## 2021-09-20 RX ADMIN — SODIUM CHLORIDE 250 MILLILITER(S): 9 INJECTION INTRAMUSCULAR; INTRAVENOUS; SUBCUTANEOUS at 19:25

## 2021-09-20 RX ADMIN — MIDODRINE HYDROCHLORIDE 15 MILLIGRAM(S): 2.5 TABLET ORAL at 17:27

## 2021-09-20 RX ADMIN — Medication 50 MILLIGRAM(S): at 11:31

## 2021-09-20 RX ADMIN — DABIGATRAN ETEXILATE MESYLATE 150 MILLIGRAM(S): 150 CAPSULE ORAL at 09:28

## 2021-09-20 RX ADMIN — TAMSULOSIN HYDROCHLORIDE 0.4 MILLIGRAM(S): 0.4 CAPSULE ORAL at 21:51

## 2021-09-20 RX ADMIN — MIDODRINE HYDROCHLORIDE 15 MILLIGRAM(S): 2.5 TABLET ORAL at 05:45

## 2021-09-20 RX ADMIN — Medication 1 MILLIGRAM(S): at 09:28

## 2021-09-20 NOTE — PROGRESS NOTE ADULT - SUBJECTIVE AND OBJECTIVE BOX
HPI:  55M hx HTN BPH Gerd chronic a fib ??? on pradaxa.  GERD alcoholic cirrhosis     FirSt time in [place].  His hx is not really reliable and the SNF records are sparse.  States he was at [facility] in July for a few weeks after he went into alcohol withdrawal.  He does not have any recollection of his time at [facility].    He was sent to [facility] for physical rehab.    In [facility] until this afternoon when her felt dizzy.  He states than he has not been eating or drinking much.    Admit with MELISSA hypotension and junctional nena cardia.  Cultured given fluids abx and started on nor-epi  noted with mild hyponatremia  He is on a bunch of diuretics nadolol and digoxin.  ,     SUBJECTIVE:  Pt is evaluated at bedside and found NAD and alert. Patient aggravated about his heart rate increasing and decreasing, he would like to go to the bathroom, and is upset he had his medication regimen changed.       MEDICATIONS  (STANDING):  dabigatran 150 milliGRAM(s) Oral every 12 hours  folic acid 1 milliGRAM(s) Oral daily  hydrocortisone sodium succinate Injectable 50 milliGRAM(s) IV Push every 6 hours  lactulose Syrup 10 Gram(s) Oral every 8 hours  midodrine. 15 milliGRAM(s) Oral three times a day  pantoprazole    Tablet 40 milliGRAM(s) Oral before breakfast  rifAXIMin 550 milliGRAM(s) Oral two times a day  tamsulosin 0.4 milliGRAM(s) Oral at bedtime  thiamine 100 milliGRAM(s) Oral daily  tiotropium 18 MICROgram(s) Capsule 1 Capsule(s) Inhalation daily    MEDICATIONS  (PRN):        LABS:                          9.4    7.01  )-----------( 111      ( 19 Sep 2021 06:33 )             29.1     19 Sep 2021 06:33    136    |  106    |  15     ----------------------------<  117    3.8     |  23     |  0.67     Ca    8.7        19 Sep 2021 06:33    TPro  5.7    /  Alb  2.4    /  TBili  0.6    /  DBili  x      /  AST  14     /  ALT  9      /  AlkPhos  48     19 Sep 2021 06:33    LIVER FUNCTIONS - ( 19 Sep 2021 06:33 )  Alb: 2.4 g/dL / Pro: 5.7 gm/dL / ALK PHOS: 48 U/L / ALT: 9 U/L / AST: 14 U/L / GGT: x           PT/INR - ( 18 Sep 2021 05:20 )   PT: 15.4 sec;   INR: 1.33 ratio         PTT - ( 19 Sep 2021 06:33 )  PTT:62.5 sec  CAPILLARY BLOOD GLUCOSE      RADIOLOGY:    US DOPPLERS:  RIGHT:  Normal compressibility of the RIGHT common femoral, femoral and popliteal veins.  Doppler examination shows normal spontaneous and phasic flow.  No RIGHT calf vein thrombosis is detected. Peroneal, soleal, gastrocnemius vein not visualized.    LEFT:  Normal compressibility of the LEFT common femoral, femoral and popliteal veins.  Doppler examination shows normal spontaneous and phasic flow.  No LEFT calf vein thrombosis is detected. Peroneal and soleal vein not visualized.    IMPRESSION:  No evidence of deep venous thrombosis in either lower extremity.

## 2021-09-20 NOTE — PROGRESS NOTE ADULT - SUBJECTIVE AND OBJECTIVE BOX
HPI:  54 yo male with HTN, alcohol cirrhosis, chronic AF was brought in from rehab for dizziness and hypotension, , in ED found to have MELISSA and  hypotension and junctional rhythm, briefly required pressor support.  Nadolol and digoxin were discontinued.       Subjective: pt evaluated sitting up in chair, denies any symptoms of chest pain, sob dizziness or palpitations.  Reports feeling much improved, brief episode of RVR when attempted to walk with PT, pt was asymptomatic during event.       TELE: heart rate in afib 60-80bpm, episode of RVR to 160s    MEDICATIONS  (STANDING):  dabigatran 150 milliGRAM(s) Oral every 12 hours  folic acid 1 milliGRAM(s) Oral daily  hydrocortisone sodium succinate Injectable 50 milliGRAM(s) IV Push every 6 hours  lactulose Syrup 10 Gram(s) Oral every 8 hours  midodrine. 15 milliGRAM(s) Oral three times a day  pantoprazole    Tablet 40 milliGRAM(s) Oral before breakfast  rifAXIMin 550 milliGRAM(s) Oral two times a day  tamsulosin 0.4 milliGRAM(s) Oral at bedtime  thiamine 100 milliGRAM(s) Oral daily  tiotropium 18 MICROgram(s) Capsule 1 Capsule(s) Inhalation daily    MEDICATIONS  (PRN):      Allergies    No Known Allergies    Vital Signs Last 24 Hrs  T(C): 35.8 (20 Sep 2021 15:41), Max: 36.2 (20 Sep 2021 09:31)  T(F): 96.5 (20 Sep 2021 15:41), Max: 97.1 (20 Sep 2021 09:31)  HR: 79 (20 Sep 2021 15:00) (62 - 79)  BP: 129/82 (20 Sep 2021 15:00) (103/64 - 129/82)  BP(mean): 94 (20 Sep 2021 15:00) (72 - 94)  RR: 14 (20 Sep 2021 15:00) (14 - 19)  SpO2: 99% (20 Sep 2021 11:18) (96% - 99%)    PHYSICAL EXAMINATION:  GENERAL: In no apparent distress, well nourished, and hydrated.  HEAD:  Atraumatic, Normocephalic  EYES: EOMI, PERRLA, conjunctiva and sclera clear  ENMT: No tonsillar erythema, exudates, or enlargement; Moist mucous membranes, Good dentition, No lesions  NECK: Supple and normal thyroid.  No JVD or carotid bruit.  Carotid pulse is 2+ bilaterally.  HEART: Regular rate and rhythm; No murmurs, rubs, or gallops.  PULMONARY: Clear to auscultation and perfusion.  No rales, wheezing, or rhonchi bilaterally.  ABDOMEN: Soft, Nontender, Nondistended; Bowel sounds present  EXTREMITIES:  2+ Peripheral Pulses, No clubbing, cyanosis, or edema  LYMPH: No lymphadenopathy noted  NEUROLOGICAL: Grossly nonfocal    LABS:                        9.7    6.09  )-----------( 113      ( 20 Sep 2021 06:29 )             29.7     09-20    137  |  106  |  15  ----------------------------<  112<H>  4.4   |  27  |  0.77    Ca    8.8      20 Sep 2021 06:29    TPro  5.7<L>  /  Alb  2.5<L>  /  TBili  0.6  /  DBili  x   /  AST  18  /  ALT  11<L>  /  AlkPhos  48  09-20    PTT - ( 20 Sep 2021 06:29 )  PTT:31.7 sec      RADIOLOGY & ADDITIONAL TESTS:     Impression     Summary     Mild mitral annular calcification is present.   Fibrocalcific changes noted to the mitral valve leaflets with preserved   leaflet excursion.   Mild (1+) mitral regurgitation is present.   The aortic valve is trileaflet with thin pliable leaflets.   The tricuspid valve leaflets are thin and pliable; valve motion is normal.   Moderate (2+) tricuspid valve regurgitation is present.   Mild pulmonary hypertension.   The left atrium is severely dilated.   The left ventricle is normal in size, wall thickness, wall motion and   contractility.   Estimated left ventricular ejection fraction is 65-70 %.   The right atrium appears severely dilated.   Normal appearing right ventricle structure and function.   The IVC appears normal.     Signature     ----------------------------------------------------------------   Electronically signed by Venugopal Palla, MD(Interpreting   physician) on 09/12/2021 07:23 PM   ----------------------------------------------------------------

## 2021-09-20 NOTE — PROGRESS NOTE ADULT - ASSESSMENT
55 yr old male with above history admitted with hypotension and near syncope.      A/P:  Near syncope, bradycardia, hypotension/shock  Off pressors, now on Midodrine PO 15mg tid , try to lower dose if BP tolerates  on hydrocortisone IV -  transition to PO as tolerates  Trial of low dose BB resulted in marked hypotension  Atrial fib- continue prodaxa for oac  brief episodes of rapid afib during activity, asymptomatic  and stable, will allow for this tachy as long as pt remains asymptomatic and otherwise hemodynamically stable.    no indication for pacemaker implant at this time, as pt's primary issue is low blood pressure and inability to tolerate rate control medications.   discussed with pt, nurse and Dr Mckinley.

## 2021-09-20 NOTE — PROVIDER CONTACT NOTE (OTHER) - ASSESSMENT
Pt denies symptoms but SOB, shaking and weakness visible. Pt denies symptoms but MORRIS, diaphoresis, flushing, shaking and weakness visible.

## 2021-09-20 NOTE — PROVIDER CONTACT NOTE (OTHER) - ACTION/TREATMENT ORDERED:
No new medication orders at this time. Dr. Alvarez states she will speak to cardiology about any further management.
No orders received at this time. Per NP Tia pt is allowed to ambulate.
see MAR

## 2021-09-20 NOTE — PROVIDER CONTACT NOTE (OTHER) - SITUATION
Pt with rapid afib upon ambulation up to 170.
Pt's 's upon ambulation to bathroom.
called Dr. Mckinley service for a routine consult.
notified office; spoke to Lisseth

## 2021-09-20 NOTE — PROVIDER CONTACT NOTE (OTHER) - NAME OF MD/NP/PA/DO NOTIFIED:
JAG MARQUEZ
Cardiac Electrophysiology
Dr. alan Bar
Senior resident Dr. Alvarez
JAG MARQUEZ/ MD Brooks

## 2021-09-20 NOTE — PROGRESS NOTE ADULT - ASSESSMENT
Pt has been seen and examined with FP resident, resident supervised agree with a/p       Patient is a 55y old  Male who presents with a chief complaint of Near syncope (20 Sep 2021 16:40)             (12 Sep 2021 23:06)      PHYSICAL EXAM:  Vital Signs Last 24 Hrs  T(C): 35.8 (20 Sep 2021 15:41), Max: 36.2 (20 Sep 2021 09:31)  T(F): 96.5 (20 Sep 2021 15:41), Max: 97.1 (20 Sep 2021 09:31)  HR: 79 (20 Sep 2021 15:00) (62 - 79)  BP: 129/82 (20 Sep 2021 15:00) (103/64 - 129/82)  BP(mean): 94 (20 Sep 2021 15:00) (72 - 94)  RR: 14 (20 Sep 2021 15:00) (14 - 19)  SpO2: 99% (20 Sep 2021 11:18) (96% - 99%)  -rs-aeeb, cta  -cvs-s1s2 normal   -p/a-soft,bs+      A/P    #monitor for today     #d/c plan for tomorrow

## 2021-09-20 NOTE — CHART NOTE - NSCHARTNOTEFT_GEN_A_CORE
Pt got up to go to the bathroom.  Prior to this admission he was at rehab.  He became very weak, as he stated he has a pain in his back.  On Telemetry he had AT fib with RVR.  Pt didn't tolerate low dose BB last week and became severely hypotensive.    As per Dr. Mckinley-pt given 250 cc of NS over 1 hour.  He has LVEF of 55%.

## 2021-09-20 NOTE — PROGRESS NOTE ADULT - ASSESSMENT
55M hx HTN BPH, GERD, chronic a fib  on pradaxa, alcoholic cirrhosis, EtOH abuse came to ED [facility] from [facility] rehab for evaluation of  dizziness, hypotension and near syncope.      #Near syncope complicated by shock   - Hypotension at admission could be secondary to BB treatment and possible adrenal insufficiency   - s/p treatment with levophed until 9/16/21  - Cortisol 11 ( borderline)   - BP today with -123  - Continue hydrocortisone sodium succinate Injectable 50 mg IV every 6 hours  - continue Midodrine to 15 mg PO TID   - On propanolol 20 mg PO BID at home (confirmed by pharmacy)   - Unable to continue BB due to induced hypotension and bradycardia  - EP recommendations appreciated     #Afib   - On Propanolol 20 mg PO BID at home, hold now due to hypotension   - Pt with Afib Hr 70-90 at rest but had multiple episode of HR > 160 at exertion   - Unable to tolerated BB during this admission   - Digoxin was hold at admission   - EP consult appreciated.    - Start Pradaxa 150 mg PO BID     #Liver cirrhosis  - Secondary to EtOH abuse   - Continue rifaximin 550 mg PO BID   - Liver enzymes in the lower side   - Avoid nephrotoxic medications     #Thrombocytopenia  - Plts 111   - 183 on sep 12   - No signs of bleeding   - Mostly secondary to liver cirrhosis     #GERD  - Continue Pantoprazole    #Dispo   - DC in AM  Continue to monitor.

## 2021-09-21 LAB
ALBUMIN SERPL ELPH-MCNC: 2.4 G/DL — LOW (ref 3.3–5)
ALP SERPL-CCNC: 48 U/L — SIGNIFICANT CHANGE UP (ref 40–120)
ALT FLD-CCNC: 12 U/L — SIGNIFICANT CHANGE UP (ref 12–78)
ANION GAP SERPL CALC-SCNC: 4 MMOL/L — LOW (ref 5–17)
AST SERPL-CCNC: 22 U/L — SIGNIFICANT CHANGE UP (ref 15–37)
BILIRUB SERPL-MCNC: 0.6 MG/DL — SIGNIFICANT CHANGE UP (ref 0.2–1.2)
BUN SERPL-MCNC: 16 MG/DL — SIGNIFICANT CHANGE UP (ref 7–23)
CALCIUM SERPL-MCNC: 8.4 MG/DL — LOW (ref 8.5–10.1)
CHLORIDE SERPL-SCNC: 109 MMOL/L — HIGH (ref 96–108)
CO2 SERPL-SCNC: 25 MMOL/L — SIGNIFICANT CHANGE UP (ref 22–31)
CREAT SERPL-MCNC: 0.63 MG/DL — SIGNIFICANT CHANGE UP (ref 0.5–1.3)
GLUCOSE SERPL-MCNC: 119 MG/DL — HIGH (ref 70–99)
HCT VFR BLD CALC: 28.7 % — LOW (ref 39–50)
HGB BLD-MCNC: 9.3 G/DL — LOW (ref 13–17)
MCHC RBC-ENTMCNC: 30 PG — SIGNIFICANT CHANGE UP (ref 27–34)
MCHC RBC-ENTMCNC: 32.4 GM/DL — SIGNIFICANT CHANGE UP (ref 32–36)
MCV RBC AUTO: 92.6 FL — SIGNIFICANT CHANGE UP (ref 80–100)
PLATELET # BLD AUTO: 117 K/UL — LOW (ref 150–400)
POTASSIUM SERPL-MCNC: 4 MMOL/L — SIGNIFICANT CHANGE UP (ref 3.5–5.3)
POTASSIUM SERPL-SCNC: 4 MMOL/L — SIGNIFICANT CHANGE UP (ref 3.5–5.3)
PROT SERPL-MCNC: 5.6 GM/DL — LOW (ref 6–8.3)
RBC # BLD: 3.1 M/UL — LOW (ref 4.2–5.8)
RBC # FLD: 15.1 % — HIGH (ref 10.3–14.5)
SODIUM SERPL-SCNC: 138 MMOL/L — SIGNIFICANT CHANGE UP (ref 135–145)
WBC # BLD: 6.33 K/UL — SIGNIFICANT CHANGE UP (ref 3.8–10.5)
WBC # FLD AUTO: 6.33 K/UL — SIGNIFICANT CHANGE UP (ref 3.8–10.5)

## 2021-09-21 PROCEDURE — 99233 SBSQ HOSP IP/OBS HIGH 50: CPT | Mod: GC

## 2021-09-21 PROCEDURE — 99232 SBSQ HOSP IP/OBS MODERATE 35: CPT

## 2021-09-21 RX ORDER — PYRIDOSTIGMINE BROMIDE 60 MG/5ML
60 SOLUTION ORAL
Refills: 0 | Status: DISCONTINUED | OUTPATIENT
Start: 2021-09-21 | End: 2021-09-27

## 2021-09-21 RX ADMIN — MIDODRINE HYDROCHLORIDE 15 MILLIGRAM(S): 2.5 TABLET ORAL at 11:07

## 2021-09-21 RX ADMIN — DABIGATRAN ETEXILATE MESYLATE 150 MILLIGRAM(S): 150 CAPSULE ORAL at 11:05

## 2021-09-21 RX ADMIN — PANTOPRAZOLE SODIUM 40 MILLIGRAM(S): 20 TABLET, DELAYED RELEASE ORAL at 06:02

## 2021-09-21 RX ADMIN — TIOTROPIUM BROMIDE 1 CAPSULE(S): 18 CAPSULE ORAL; RESPIRATORY (INHALATION) at 08:58

## 2021-09-21 RX ADMIN — MIDODRINE HYDROCHLORIDE 15 MILLIGRAM(S): 2.5 TABLET ORAL at 18:37

## 2021-09-21 RX ADMIN — TAMSULOSIN HYDROCHLORIDE 0.4 MILLIGRAM(S): 0.4 CAPSULE ORAL at 21:13

## 2021-09-21 RX ADMIN — PYRIDOSTIGMINE BROMIDE 60 MILLIGRAM(S): 60 SOLUTION ORAL at 11:07

## 2021-09-21 RX ADMIN — Medication 40 MILLIGRAM(S): at 18:38

## 2021-09-21 RX ADMIN — LACTULOSE 10 GRAM(S): 10 SOLUTION ORAL at 11:06

## 2021-09-21 RX ADMIN — MIDODRINE HYDROCHLORIDE 15 MILLIGRAM(S): 2.5 TABLET ORAL at 06:02

## 2021-09-21 RX ADMIN — Medication 1 MILLIGRAM(S): at 11:05

## 2021-09-21 RX ADMIN — Medication 50 MILLIGRAM(S): at 11:05

## 2021-09-21 RX ADMIN — DABIGATRAN ETEXILATE MESYLATE 150 MILLIGRAM(S): 150 CAPSULE ORAL at 21:13

## 2021-09-21 RX ADMIN — PYRIDOSTIGMINE BROMIDE 60 MILLIGRAM(S): 60 SOLUTION ORAL at 21:13

## 2021-09-21 RX ADMIN — Medication 100 MILLIGRAM(S): at 11:06

## 2021-09-21 RX ADMIN — Medication 50 MILLIGRAM(S): at 06:02

## 2021-09-21 NOTE — PROGRESS NOTE ADULT - ASSESSMENT
55M hx HTN BPH, GERD, chronic a fib  on pradaxa, alcoholic cirrhosis, EtOH abuse came to ED [facility] from [facility] rehab for evaluation of  dizziness, hypotension and near syncope.      #Near syncope complicated by shock   - Hypotension at admission could be secondary to BB treatment and possible adrenal insufficiency   - s/p treatment with levophed until 9/16/21  - Cortisol 11 ( borderline)   - Transitioned Solu-Cortef to Prednisone 40 mg q6H, monitor BP   - Continue Midodrine to 15 mg PO TID   - On propanolol 20 mg PO BID at home (confirmed by pharmacy), Unable to continue BB due to induced hypotension and bradycardia  - EP recommendations appreciated: started on pyridostigmine 60 mg BID  -Continue to monitor     #Afib   - On Propanolol 20 mg PO BID at home, hold now due to hypotension   - Pt with Afib Hr 70-90 at rest but had multiple episode of HR > 160 at exertion   - Digoxin was hold at admission   - EP consult appreciated  - Continue Pradaxa 150 mg PO BID     #Liver cirrhosis  - Secondary to EtOH abuse   - Continue rifaximin 550 mg PO BID   - LFT's WNL   - Avoid nephrotoxic medications     #Thrombocytopenia  - No signs of bleeding   - Mostly secondary to liver cirrhosis     #GERD  - Continue Pantoprazole    #Dispo   -DC in AM if BP tolerates    *Above discussed w Dr. Zavala

## 2021-09-21 NOTE — PROGRESS NOTE ADULT - SUBJECTIVE AND OBJECTIVE BOX
HPI:  55M hx HTN BPH Gerd chronic a fib on pradaxa.  GERD alcoholic cirrhosis   First time in [place].  His hx is not really reliable and the SNF records are sparse.  States he was at [facility] in July for a few weeks after he went into alcohol withdrawal.  He does not have any recollection of his time at [facility].    He was sent to [facility] SN[facility] for physical rehab.    In [facility] until this afternoon when her felt dizzy.  He states than he has not been eating or drinking much.    Admit with MELISSA hypotension and junctional nena cardia.  Cultured given fluids abx and started on nor-epi  noted with mild hyponatremia  He is on a bunch of diuretics nadolol and digoxin.  ,     09/21: Pt is evaluated at bedside, discussed plan about evaluating on pyridostigmine 60 mg BID. Pt in agreement w monitoring and evaluating physical activity.      Vitals:  ============  T(F): 97.6 (21 Sep 2021 15:12), Max: 98.6 (20 Sep 2021 23:58)  HR: 69 (21 Sep 2021 15:00)  BP: 92/54 (21 Sep 2021 15:00)  RR: 16 (21 Sep 2021 15:00)  SpO2: 92% (21 Sep 2021 12:00) (92% - 100%)  temp max in last 48H T(F): , Max: 98.6 (09-20-21 @ 23:58)    Physical Exam   Gen: NAD, comfortable  HENT: atraumatic head and ears, no gross abnormalities of ears, mucous membranes moist, no oral lesions, neck supple without masses/goiter/lymphadenopathy  CV: RRR, nl s1/s2, no M/R/G  Pulm: nl respiratory effort, CTAB, no wheezes/crackles/rhonchi  Back: no scoliosis, lordosis, or kyphosis, no tenderness  Abd: normoactive bowel sounds in all 4 quadrants, soft, nontender, nondistended, no rebound, no guarding, no masses  Extremities: no pedal edema, pedal pulses palpable   Skin: nl warm and dry, no wounds   Neuro: A&Ox3, answering questions appropriately, PERRL, EOMI, face symmetric      =======================================================  Current Antibiotics:  rifAXIMin 550 milliGRAM(s) Oral two times a day    Other medications:  dabigatran 150 milliGRAM(s) Oral every 12 hours  folic acid 1 milliGRAM(s) Oral daily  lactulose Syrup 10 Gram(s) Oral daily  midodrine. 15 milliGRAM(s) Oral three times a day  pantoprazole    Tablet 40 milliGRAM(s) Oral before breakfast  predniSONE   Tablet 40 milliGRAM(s) Oral every 6 hours  pyridostigmine 60 milliGRAM(s) Oral two times a day  tamsulosin 0.4 milliGRAM(s) Oral at bedtime  thiamine 100 milliGRAM(s) Oral daily  tiotropium 18 MICROgram(s) Capsule 1 Capsule(s) Inhalation daily      =======================================================  Labs:                        9.3    6.33  )-----------( 117      ( 21 Sep 2021 06:28 )             28.7     09-21    138  |  109<H>  |  16  ----------------------------<  119<H>  4.0   |  25  |  0.63    Ca    8.4<L>      21 Sep 2021 06:28    TPro  5.6<L>  /  Alb  2.4<L>  /  TBili  0.6  /  DBili  x   /  AST  22  /  ALT  12  /  AlkPhos  48  09-21      Culture - Blood (collected 09-17-21 @ 05:12)  Source: .Blood None    Culture - Blood (collected 09-17-21 @ 05:12)  Source: .Blood None    Culture - Blood (collected 09-12-21 @ 19:43)  Source: .Blood None  Final Report (09-18-21 @ 01:00):    No Growth Final    Culture - Blood (collected 09-12-21 @ 16:07)  Source: .Blood Blood-Peripheral  Final Report (09-18-21 @ 01:00):    No Growth Final      Creatinine, Serum: 0.63 mg/dL (09-21-21 @ 06:28)  Creatinine, Serum: 0.77 mg/dL (09-20-21 @ 06:29)  Creatinine, Serum: 0.67 mg/dL (09-19-21 @ 06:33)  Creatinine, Serum: 0.72 mg/dL (09-18-21 @ 05:20)  Creatinine, Serum: 0.70 mg/dL (09-17-21 @ 05:12)    Procalcitonin, Serum: 0.10 ng/mL (09-16-21 @ 05:22)          WBC Count: 6.33 K/uL (09-21-21 @ 06:28)  WBC Count: 6.09 K/uL (09-20-21 @ 06:29)  WBC Count: 7.01 K/uL (09-19-21 @ 06:33)  WBC Count: 5.78 K/uL (09-18-21 @ 05:20)  WBC Count: 4.98 K/uL (09-17-21 @ 05:09)      COVID-19 PCR: NotDetec (09-12-21 @ 16:07)      Alkaline Phosphatase, Serum: 48 U/L (09-21-21 @ 06:28)  Alkaline Phosphatase, Serum: 48 U/L (09-20-21 @ 06:29)  Alkaline Phosphatase, Serum: 48 U/L (09-19-21 @ 06:33)  Alkaline Phosphatase, Serum: 47 U/L (09-18-21 @ 05:20)  Alanine Aminotransferase (ALT/SGPT): 12 U/L (09-21-21 @ 06:28)  Alanine Aminotransferase (ALT/SGPT): 11 U/L (09-20-21 @ 06:29)  Alanine Aminotransferase (ALT/SGPT): 9 U/L (09-19-21 @ 06:33)  Alanine Aminotransferase (ALT/SGPT): 9 U/L (09-18-21 @ 05:20)  Aspartate Aminotransferase (AST/SGOT): 22 U/L (09-21-21 @ 06:28)  Aspartate Aminotransferase (AST/SGOT): 18 U/L (09-20-21 @ 06:29)  Aspartate Aminotransferase (AST/SGOT): 14 U/L (09-19-21 @ 06:33)  Aspartate Aminotransferase (AST/SGOT): 14 U/L (09-18-21 @ 05:20)  Bilirubin Total, Serum: 0.6 mg/dL (09-21-21 @ 06:28)  Bilirubin Total, Serum: 0.6 mg/dL (09-20-21 @ 06:29)  Bilirubin Total, Serum: 0.6 mg/dL (09-19-21 @ 06:33)  Bilirubin Total, Serum: 0.7 mg/dL (09-18-21 @ 05:20)              RADIOLOGY:    IMPRESSION:  No evidence of deep venous thrombosis in either lower extremity.

## 2021-09-21 NOTE — PROGRESS NOTE ADULT - ASSESSMENT
55 yr old male with above history admitted with hypotension and near syncope.      A/P:  Near syncope, bradycardia, hypotension/shock  Off pressors, now on Midodrine PO 15mg tid , try to lower dose if BP tolerates  on hydrocortisone IV -  transition to PO as tolerates  Trial of low dose BB resulted in marked hypotension  Atrial fib- continue Pradaxa for oac  Has brief episodes of rapid afib during activity, asymptomatic  and stable, will allow for this tachy as long as pt remains asymptomatic and otherwise hemodynamically stable.    No indication for pacemaker implant at this time, as pt's primary issue is low blood pressure and inability to tolerate rate control medications.   As per Dr Mckinley will start Mestinon 60 mg PO BID in addition to midodrine.  Plan discussed with patient, RN and Dr Mckinley.

## 2021-09-21 NOTE — PROGRESS NOTE ADULT - SUBJECTIVE AND OBJECTIVE BOX
HPI:  54 yo male with HTN, alcohol cirrhosis, chronic AF was brought in from rehab for dizziness and hypotension, , in ED found to have MELISSA and  hypotension and junctional rhythm, briefly required pressor support.  Nadolol and digoxin were discontinued.       9/21/21: pt resting in bed, denies any c/o, wants to go to rehab  TELE: atrial fib 80-90 bpm    MEDICATIONS  (STANDING):  dabigatran 150 milliGRAM(s) Oral every 12 hours  folic acid 1 milliGRAM(s) Oral daily  hydrocortisone sodium succinate Injectable 50 milliGRAM(s) IV Push every 6 hours  lactulose Syrup 10 Gram(s) Oral daily  midodrine. 15 milliGRAM(s) Oral three times a day  pantoprazole    Tablet 40 milliGRAM(s) Oral before breakfast  pyridostigmine 60 milliGRAM(s) Oral two times a day  rifAXIMin 550 milliGRAM(s) Oral two times a day  tamsulosin 0.4 milliGRAM(s) Oral at bedtime  thiamine 100 milliGRAM(s) Oral daily  tiotropium 18 MICROgram(s) Capsule 1 Capsule(s) Inhalation daily    MEDICATIONS  (PRN):    Allergies    No Known Allergies    Intolerances    REVIEW OF SYSTEMS:    CONSTITUTIONAL: No weakness, fevers or chills  EYES/ENT: No visual changes;  No vertigo or throat pain   NECK: No pain or stiffness  RESPIRATORY: No cough, wheezing, hemoptysis; No shortness of breath  CARDIOVASCULAR: No chest pain or palpitations  GASTROINTESTINAL: No abdominal or epigastric pain. No nausea, vomiting, or hematemesis; No diarrhea or constipation. No melena or hematochezia.  GENITOURINARY: No dysuria, frequency or hematuria  NEUROLOGICAL: No numbness or weakness  SKIN: No itching, burning, rashes, or lesions   All other review of systems is negative unless indicated above    Vital Signs Last 24 Hrs  T(C): 36.1 (21 Sep 2021 04:26), Max: 37 (20 Sep 2021 23:58)  T(F): 97 (21 Sep 2021 04:26), Max: 98.6 (20 Sep 2021 23:58)  HR: 72 (21 Sep 2021 06:00) (72 - 152)  BP: 89/54 (21 Sep 2021 06:00) (80/45 - 129/82)  BP(mean): 62 (21 Sep 2021 06:00) (54 - 94)  RR: 14 (21 Sep 2021 06:00) (13 - 28)  SpO2: 99% (20 Sep 2021 19:00) (98% - 100%)                          9.3    6.33  )-----------( 117      ( 21 Sep 2021 06:28 )             28.7     09-21    138  |  109<H>  |  16  ----------------------------<  119<H>  4.0   |  25  |  0.63    Ca    8.4<L>      21 Sep 2021 06:28    TPro  5.6<L>  /  Alb  2.4<L>  /  TBili  0.6  /  DBili  x   /  AST  22  /  ALT  12  /  AlkPhos  48  09-21    PHYSICAL EXAMINATION:    GENERAL APPEARANCE:  Pt. is not currently dyspneic, in no distress. Pt. is A & O x 3  HEENT:  Pupils are normal and react normally. No icterus. Mucous membranes well colored.  NECK:  Supple. No lymphadenopathy. Jugular venous pressure not elevated. Carotids equal.   HEART:   S1S2 irreg. There are no murmurs, rubs or gallops noted  CHEST:  Chest is clear to auscultation. Normal respiratory effort.  ABDOMEN:  Soft and nontender.   EXTREMITIES:  There is no edema. Has right heel ulcer  SKIN:  No rash or significant lesions are noted.    CARDIAC TESTS:  < from: TTE Echo Complete w/o Contrast w/ Doppler (09.12.21 @ 19:06) >     Impression     Summary     Mild mitral annular calcification is present.   Fibrocalcific changes noted to the mitral valve leaflets with preserved   leaflet excursion.   Mild (1+) mitral regurgitation is present.   The aortic valve is trileaflet with thin pliable leaflets.   The tricuspid valve leaflets are thin and pliable; valve motion is normal.   Moderate (2+) tricuspid valve regurgitation is present.   Mild pulmonary hypertension.   The left atrium is severely dilated.   The left ventricle is normal in size, wall thickness, wall motion and   contractility.   Estimated left ventricular ejection fraction is 65-70 %.   The right atrium appears severely dilated.   Normal appearing right ventricle structure and function.   The IVC appears normal.

## 2021-09-21 NOTE — PROGRESS NOTE ADULT - ASSESSMENT
Pt has been seen and examined with FP resident, resident supervised agree with a/p       Patient is a 55y old  Male who presents with a chief complaint of Near syncope (20 Sep 2021 16:40)             (12 Sep 2021 23:06)      PHYSICAL EXAM:    -rs-aeeb, cta  -cvs-s1s2 normal   -p/a-soft,bs+      A/P    # ct to monitor in CICU     #having low bp and reactive tachyarrhythmia    #Pyridostigmine started today- ct to monitor

## 2021-09-22 LAB
ALBUMIN SERPL ELPH-MCNC: 2.4 G/DL — LOW (ref 3.3–5)
ALP SERPL-CCNC: 45 U/L — SIGNIFICANT CHANGE UP (ref 40–120)
ALT FLD-CCNC: 16 U/L — SIGNIFICANT CHANGE UP (ref 12–78)
ANION GAP SERPL CALC-SCNC: 8 MMOL/L — SIGNIFICANT CHANGE UP (ref 5–17)
AST SERPL-CCNC: 27 U/L — SIGNIFICANT CHANGE UP (ref 15–37)
BILIRUB SERPL-MCNC: 0.6 MG/DL — SIGNIFICANT CHANGE UP (ref 0.2–1.2)
BUN SERPL-MCNC: 17 MG/DL — SIGNIFICANT CHANGE UP (ref 7–23)
CALCIUM SERPL-MCNC: 8.2 MG/DL — LOW (ref 8.5–10.1)
CHLORIDE SERPL-SCNC: 107 MMOL/L — SIGNIFICANT CHANGE UP (ref 96–108)
CO2 SERPL-SCNC: 24 MMOL/L — SIGNIFICANT CHANGE UP (ref 22–31)
CREAT SERPL-MCNC: 0.61 MG/DL — SIGNIFICANT CHANGE UP (ref 0.5–1.3)
CULTURE RESULTS: SIGNIFICANT CHANGE UP
CULTURE RESULTS: SIGNIFICANT CHANGE UP
GLUCOSE SERPL-MCNC: 106 MG/DL — HIGH (ref 70–99)
HCT VFR BLD CALC: 29 % — LOW (ref 39–50)
HGB BLD-MCNC: 9.4 G/DL — LOW (ref 13–17)
MCHC RBC-ENTMCNC: 30.6 PG — SIGNIFICANT CHANGE UP (ref 27–34)
MCHC RBC-ENTMCNC: 32.4 GM/DL — SIGNIFICANT CHANGE UP (ref 32–36)
MCV RBC AUTO: 94.5 FL — SIGNIFICANT CHANGE UP (ref 80–100)
PLATELET # BLD AUTO: 112 K/UL — LOW (ref 150–400)
POTASSIUM SERPL-MCNC: 4.2 MMOL/L — SIGNIFICANT CHANGE UP (ref 3.5–5.3)
POTASSIUM SERPL-SCNC: 4.2 MMOL/L — SIGNIFICANT CHANGE UP (ref 3.5–5.3)
PROT SERPL-MCNC: 5.9 GM/DL — LOW (ref 6–8.3)
RBC # BLD: 3.07 M/UL — LOW (ref 4.2–5.8)
RBC # FLD: 15.2 % — HIGH (ref 10.3–14.5)
SARS-COV-2 RNA SPEC QL NAA+PROBE: SIGNIFICANT CHANGE UP
SODIUM SERPL-SCNC: 139 MMOL/L — SIGNIFICANT CHANGE UP (ref 135–145)
SPECIMEN SOURCE: SIGNIFICANT CHANGE UP
SPECIMEN SOURCE: SIGNIFICANT CHANGE UP
WBC # BLD: 5.4 K/UL — SIGNIFICANT CHANGE UP (ref 3.8–10.5)
WBC # FLD AUTO: 5.4 K/UL — SIGNIFICANT CHANGE UP (ref 3.8–10.5)

## 2021-09-22 PROCEDURE — 99233 SBSQ HOSP IP/OBS HIGH 50: CPT | Mod: GC

## 2021-09-22 PROCEDURE — 99232 SBSQ HOSP IP/OBS MODERATE 35: CPT

## 2021-09-22 RX ADMIN — Medication 100 MILLIGRAM(S): at 09:34

## 2021-09-22 RX ADMIN — Medication 1 MILLIGRAM(S): at 09:34

## 2021-09-22 RX ADMIN — PYRIDOSTIGMINE BROMIDE 60 MILLIGRAM(S): 60 SOLUTION ORAL at 21:28

## 2021-09-22 RX ADMIN — Medication 40 MILLIGRAM(S): at 12:09

## 2021-09-22 RX ADMIN — PYRIDOSTIGMINE BROMIDE 60 MILLIGRAM(S): 60 SOLUTION ORAL at 09:34

## 2021-09-22 RX ADMIN — MIDODRINE HYDROCHLORIDE 15 MILLIGRAM(S): 2.5 TABLET ORAL at 05:50

## 2021-09-22 RX ADMIN — MIDODRINE HYDROCHLORIDE 15 MILLIGRAM(S): 2.5 TABLET ORAL at 17:08

## 2021-09-22 RX ADMIN — Medication 40 MILLIGRAM(S): at 23:27

## 2021-09-22 RX ADMIN — Medication 40 MILLIGRAM(S): at 00:19

## 2021-09-22 RX ADMIN — PANTOPRAZOLE SODIUM 40 MILLIGRAM(S): 20 TABLET, DELAYED RELEASE ORAL at 09:34

## 2021-09-22 RX ADMIN — MIDODRINE HYDROCHLORIDE 15 MILLIGRAM(S): 2.5 TABLET ORAL at 12:09

## 2021-09-22 RX ADMIN — TIOTROPIUM BROMIDE 1 CAPSULE(S): 18 CAPSULE ORAL; RESPIRATORY (INHALATION) at 11:49

## 2021-09-22 RX ADMIN — TAMSULOSIN HYDROCHLORIDE 0.4 MILLIGRAM(S): 0.4 CAPSULE ORAL at 21:28

## 2021-09-22 RX ADMIN — LACTULOSE 10 GRAM(S): 10 SOLUTION ORAL at 09:36

## 2021-09-22 RX ADMIN — Medication 40 MILLIGRAM(S): at 05:50

## 2021-09-22 RX ADMIN — DABIGATRAN ETEXILATE MESYLATE 150 MILLIGRAM(S): 150 CAPSULE ORAL at 09:34

## 2021-09-22 RX ADMIN — Medication 40 MILLIGRAM(S): at 17:08

## 2021-09-22 RX ADMIN — DABIGATRAN ETEXILATE MESYLATE 150 MILLIGRAM(S): 150 CAPSULE ORAL at 21:28

## 2021-09-22 NOTE — PROGRESS NOTE ADULT - SUBJECTIVE AND OBJECTIVE BOX
HPI:  56 yo male with HTN, alcohol cirrhosis, chronic AF was brought in from rehab for dizziness and hypotension, , in ED found to have MELISSA and  hypotension and junctional rhythm, briefly required pressor support.  Nadolol and digoxin were discontinued.       9/21/21: pt resting in bed, denies any c/o, wants to go to rehab  TELE: atrial fib 80-90 bpm    9/22/21: pt seen in bed in NAD.  With minimal activity, his HR jumps to 170's.  He is asymptomatic of his HR, denies dizziness, palpitations, CP or SOB.  I had the patient sit and then stand, HR rises quickly and then comes down, he was not orthostatic for me.  TELE: atrial fib 80-90 bpm at rest, with activity HR increases to 170's      MEDICATIONS  (STANDING):  dabigatran 150 milliGRAM(s) Oral every 12 hours  folic acid 1 milliGRAM(s) Oral daily  lactulose Syrup 10 Gram(s) Oral daily  midodrine. 15 milliGRAM(s) Oral three times a day  pantoprazole    Tablet 40 milliGRAM(s) Oral before breakfast  predniSONE   Tablet 40 milliGRAM(s) Oral every 6 hours  pyridostigmine 60 milliGRAM(s) Oral two times a day  rifAXIMin 550 milliGRAM(s) Oral two times a day  tamsulosin 0.4 milliGRAM(s) Oral at bedtime  thiamine 100 milliGRAM(s) Oral daily  tiotropium 18 MICROgram(s) Capsule 1 Capsule(s) Inhalation daily    MEDICATIONS  (PRN):    Allergies    No Known Allergies    Intolerances    REVIEW OF SYSTEMS:    CONSTITUTIONAL: No weakness, fevers or chills  EYES/ENT: No visual changes;  No vertigo or throat pain   NECK: No pain or stiffness  RESPIRATORY: No cough, wheezing, hemoptysis; No shortness of breath  CARDIOVASCULAR: No chest pain or palpitations  GASTROINTESTINAL: No abdominal or epigastric pain. No nausea, vomiting, or hematemesis; No diarrhea or constipation. No melena or hematochezia.  GENITOURINARY: No dysuria, frequency or hematuria  NEUROLOGICAL: No numbness or weakness  SKIN: No itching, burning, rashes, or lesions   All other review of systems is negative unless indicated above    Vital Signs Last 24 Hrs  T(C): 36.4 (22 Sep 2021 08:15), Max: 36.5 (22 Sep 2021 00:39)  T(F): 97.5 (22 Sep 2021 08:15), Max: 97.7 (22 Sep 2021 00:39)  HR: 80 (22 Sep 2021 14:00) (60 - 126)  BP: 120/90 (22 Sep 2021 14:00) (83/45 - 122/86)  BP(mean): 96 (22 Sep 2021 14:00) (54 - 96)  RR: 15 (22 Sep 2021 14:00) (12 - 21)  SpO2: 100% (22 Sep 2021 13:00) (99% - 100%)                          9.4    5.40  )-----------( 112      ( 22 Sep 2021 06:43 )             29.0       09-22    139  |  107  |  17  ----------------------------<  106<H>  4.2   |  24  |  0.61    Ca    8.2<L>      22 Sep 2021 06:43    TPro  5.9<L>  /  Alb  2.4<L>  /  TBili  0.6  /  DBili  x   /  AST  27  /  ALT  16  /  AlkPhos  45  09-22      PHYSICAL EXAMINATION:    GENERAL APPEARANCE:  Pt. is not currently dyspneic, in no distress. Pt. is A & O x 3  HEENT:  Pupils are normal and react normally. No icterus. Mucous membranes well colored.  NECK:  Supple. No lymphadenopathy. Jugular venous pressure not elevated. Carotids equal.   HEART:   S1S2 irreg. There are no murmurs, rubs or gallops noted  CHEST:  Chest is clear to auscultation. Normal respiratory effort.  ABDOMEN:  Soft and nontender.   EXTREMITIES:  There is no edema. Has right heel ulcer  SKIN:  No rash or significant lesions are noted.      CARDIAC TESTS:  < from: TTE Echo Complete w/o Contrast w/ Doppler (09.12.21 @ 19:06) >     Impression     Summary     Mild mitral annular calcification is present.   Fibrocalcific changes noted to the mitral valve leaflets with preserved   leaflet excursion.   Mild (1+) mitral regurgitation is present.   The aortic valve is trileaflet with thin pliable leaflets.   The tricuspid valve leaflets are thin and pliable; valve motion is normal.   Moderate (2+) tricuspid valve regurgitation is present.   Mild pulmonary hypertension.   The left atrium is severely dilated.   The left ventricle is normal in size, wall thickness, wall motion and   contractility.   Estimated left ventricular ejection fraction is 65-70 %.   The right atrium appears severely dilated.   Normal appearing right ventricle structure and function.   The IVC appears normal.

## 2021-09-22 NOTE — PROGRESS NOTE ADULT - SUBJECTIVE AND OBJECTIVE BOX
HPI:  55M hx HTN BPH Gerd chronic a fib on pradaxa.  GERD alcoholic cirrhosis   First time in [place].  His hx is not really reliable and the SNF records are sparse.  States he was at [facility] in July for a few weeks after he went into alcohol withdrawal.  He does not have any recollection of his time at [facility].    He was sent to [facility] SNF for physical rehab.    In USOH until this afternoon when her felt dizzy.  He states than he has not been eating or drinking much.    Admit with MELISSA hypotension and junctional nena cardia.  Cultured given fluids abx and started on nor-epi  noted with mild hyponatremia  He is on a bunch of diuretics nadolol and digoxin.  ,     09/22: Pt is evaluated at bedside, not in acute distress. He have no complaints at this point of time. Denies chest pain, dizziness, sob, nausea or vomiting.     Vital Signs Last 24 Hrs  T(C): 36.4 (22 Sep 2021 08:15), Max: 36.5 (22 Sep 2021 00:39)  T(F): 97.5 (22 Sep 2021 08:15), Max: 97.7 (22 Sep 2021 00:39)  HR: 125 (22 Sep 2021 15:15) (60 - 126)  BP: 117/90 (22 Sep 2021 15:15) (83/45 - 122/86)  BP(mean): 94 (22 Sep 2021 15:15) (54 - 96)  RR: 16 (22 Sep 2021 15:15) (12 - 21)  SpO2: 100% (22 Sep 2021 15:15) (99% - 100%)    Physical Exam   Gen: NAD, comfortable  HENT: atraumatic head and ears, no gross abnormalities of ears, mucous membranes moist, no oral lesions, neck supple without masses/goiter/lymphadenopathy  CV: RRR,  s1/s2 heard Irregular , no M/R/G  Pulm: nl respiratory effort, CTAB, no wheezes/crackles/rhonchi  Back: no scoliosis, lordosis, or kyphosis, no tenderness  Abd: normoactive bowel sounds in all 4 quadrants, soft, nontender, nondistended, no rebound, no guarding, no masses  Extremities: no pedal edema, pedal pulses palpable   Skin: nl warm and dry, no wounds   Neuro: A&Ox3, answering questions appropriately, PERRL, EOMI, face symmetric      =======================================================  MEDICATIONS  (STANDING):  dabigatran 150 milliGRAM(s) Oral every 12 hours  folic acid 1 milliGRAM(s) Oral daily  lactulose Syrup 10 Gram(s) Oral daily  midodrine. 15 milliGRAM(s) Oral three times a day  pantoprazole    Tablet 40 milliGRAM(s) Oral before breakfast  predniSONE   Tablet 40 milliGRAM(s) Oral every 6 hours  pyridostigmine 60 milliGRAM(s) Oral two times a day  rifAXIMin 550 milliGRAM(s) Oral two times a day  tamsulosin 0.4 milliGRAM(s) Oral at bedtime  thiamine 100 milliGRAM(s) Oral daily  tiotropium 18 MICROgram(s) Capsule 1 Capsule(s) Inhalation daily    MEDICATIONS  (PRN):      =======================================================  Labs:                                   9.4    5.40  )-----------( 112      ( 22 Sep 2021 06:43 )             29.0                9.3    6.33  )-----------( 117      ( 21 Sep 2021 06:28 )             28.7     09-21    138  |  109<H>  |  16  ----------------------------<  119<H>  4.0   |  25  |  0.63    Ca    8.4<L>      21 Sep 2021 06:28    TPro  5.6<L>  /  Alb  2.4<L>  /  TBili  0.6  /  DBili  x   /  AST  22  /  ALT  12  /  AlkPhos  48  09-21      Culture - Blood (collected 09-17-21 @ 05:12)  Source: .Blood None    Culture - Blood (collected 09-17-21 @ 05:12)  Source: .Blood None    Culture - Blood (collected 09-12-21 @ 19:43)  Source: .Blood None  Final Report (09-18-21 @ 01:00):    No Growth Final    Culture - Blood (collected 09-12-21 @ 16:07)  Source: .Blood Blood-Peripheral  Final Report (09-18-21 @ 01:00):    No Growth Final      Creatinine, Serum: 0.63 mg/dL (09-21-21 @ 06:28)  Creatinine, Serum: 0.77 mg/dL (09-20-21 @ 06:29)  Creatinine, Serum: 0.67 mg/dL (09-19-21 @ 06:33)  Creatinine, Serum: 0.72 mg/dL (09-18-21 @ 05:20)  Creatinine, Serum: 0.70 mg/dL (09-17-21 @ 05:12)    Procalcitonin, Serum: 0.10 ng/mL (09-16-21 @ 05:22)          WBC Count: 6.33 K/uL (09-21-21 @ 06:28)  WBC Count: 6.09 K/uL (09-20-21 @ 06:29)  WBC Count: 7.01 K/uL (09-19-21 @ 06:33)  WBC Count: 5.78 K/uL (09-18-21 @ 05:20)  WBC Count: 4.98 K/uL (09-17-21 @ 05:09)      COVID-19 PCR: NotDetec (09-12-21 @ 16:07)      Alkaline Phosphatase, Serum: 48 U/L (09-21-21 @ 06:28)  Alkaline Phosphatase, Serum: 48 U/L (09-20-21 @ 06:29)  Alkaline Phosphatase, Serum: 48 U/L (09-19-21 @ 06:33)  Alkaline Phosphatase, Serum: 47 U/L (09-18-21 @ 05:20)  Alanine Aminotransferase (ALT/SGPT): 12 U/L (09-21-21 @ 06:28)  Alanine Aminotransferase (ALT/SGPT): 11 U/L (09-20-21 @ 06:29)  Alanine Aminotransferase (ALT/SGPT): 9 U/L (09-19-21 @ 06:33)  Alanine Aminotransferase (ALT/SGPT): 9 U/L (09-18-21 @ 05:20)  Aspartate Aminotransferase (AST/SGOT): 22 U/L (09-21-21 @ 06:28)  Aspartate Aminotransferase (AST/SGOT): 18 U/L (09-20-21 @ 06:29)  Aspartate Aminotransferase (AST/SGOT): 14 U/L (09-19-21 @ 06:33)  Aspartate Aminotransferase (AST/SGOT): 14 U/L (09-18-21 @ 05:20)  Bilirubin Total, Serum: 0.6 mg/dL (09-21-21 @ 06:28)  Bilirubin Total, Serum: 0.6 mg/dL (09-20-21 @ 06:29)  Bilirubin Total, Serum: 0.6 mg/dL (09-19-21 @ 06:33)  Bilirubin Total, Serum: 0.7 mg/dL (09-18-21 @ 05:20)              RADIOLOGY:    IMPRESSION:  No evidence of deep venous thrombosis in either lower extremity.

## 2021-09-22 NOTE — PROGRESS NOTE ADULT - ASSESSMENT
55M hx HTN BPH, GERD, chronic a fib  on pradaxa, alcoholic cirrhosis, EtOH abuse came to ED [facility] from [facility] rehab for evaluation of  dizziness, hypotension and near syncope.      #Near syncope complicated by shock   - Hypotension at admission could be secondary to BB treatment and possible adrenal insufficiency   - s/p treatment with levophed until 9/16/21  - Cortisol 11 ( borderline)   - Transitioned Solu-Cortef to Prednisone 40 mg q6H, monitor BP   - Continue Midodrine to 15 mg PO TID   - On propanolol 20 mg PO BID at home (confirmed by pharmacy), Unable to continue BB due to induced hypotension and bradycardia  - EP recommendations appreciated: started on pyridostigmine 60 mg BID, possible d/c home as pt is asymptomatic and hemodynamically stable   - Pt wants to go to rehab. Will wait until he gets a place in nearby rehab center.    #Afib   - On Propanolol 20 mg PO BID at home, hold now due to hypotension   - Pt with Afib Hr 70-90 at rest but had multiple episode of HR > 160 at exertion   - Digoxin was hold at admission   - EP consult appreciated  - Continue Pradaxa 150 mg PO BID     #Liver cirrhosis  - Secondary to EtOH abuse   - Continue rifaximin 550 mg PO BID   - LFT's WNL   - Avoid nephrotoxic medications     #Thrombocytopenia  - No signs of bleeding   - Mostly secondary to liver cirrhosis     #GERD  - Continue Pantoprazole    #Dispo   -Pt is ready for discharge.  clearance is pending.    *Above discussed w Dr. Zavala

## 2021-09-22 NOTE — PROGRESS NOTE ADULT - ASSESSMENT
55 yr old male with above history admitted with hypotension and near syncope.      A/P:  Near syncope, bradycardia, hypotension/shock  Off pressors, now on Midodrine PO 15mg tid   On oral Prednisone  Trial of low dose BB resulted in marked hypotension  Atrial fib- continue Pradaxa for oac  Has brief episodes of rapid afib during activity, asymptomatic  and stable, will allow for this tachy as long as pt remains asymptomatic and otherwise hemodynamically stable.    No indication for pacemaker implant at this time, as pt's primary issue is low blood pressure and inability to tolerate rate control medications.   As per Dr Mckinley will start Mestinon 60 mg PO BID in addition to midodrine.  He is tolerating Midodrine and Mestinon, he is not symptomatic when HR increases, blood pressures are better.  Can go to rehab from EP standpoint.  Plan discussed with patient, RN, Dr Mckinley, Dr. Zavala.

## 2021-09-23 ENCOUNTER — TRANSCRIPTION ENCOUNTER (OUTPATIENT)
Age: 56
End: 2021-09-23

## 2021-09-23 LAB
ALBUMIN SERPL ELPH-MCNC: 2.6 G/DL — LOW (ref 3.3–5)
ALP SERPL-CCNC: 51 U/L — SIGNIFICANT CHANGE UP (ref 40–120)
ALT FLD-CCNC: 18 U/L — SIGNIFICANT CHANGE UP (ref 12–78)
ANION GAP SERPL CALC-SCNC: 5 MMOL/L — SIGNIFICANT CHANGE UP (ref 5–17)
AST SERPL-CCNC: 31 U/L — SIGNIFICANT CHANGE UP (ref 15–37)
BILIRUB SERPL-MCNC: 0.7 MG/DL — SIGNIFICANT CHANGE UP (ref 0.2–1.2)
BUN SERPL-MCNC: 18 MG/DL — SIGNIFICANT CHANGE UP (ref 7–23)
CALCIUM SERPL-MCNC: 8.2 MG/DL — LOW (ref 8.5–10.1)
CHLORIDE SERPL-SCNC: 107 MMOL/L — SIGNIFICANT CHANGE UP (ref 96–108)
CO2 SERPL-SCNC: 27 MMOL/L — SIGNIFICANT CHANGE UP (ref 22–31)
CREAT SERPL-MCNC: 0.65 MG/DL — SIGNIFICANT CHANGE UP (ref 0.5–1.3)
GLUCOSE SERPL-MCNC: 108 MG/DL — HIGH (ref 70–99)
HCT VFR BLD CALC: 30.2 % — LOW (ref 39–50)
HGB BLD-MCNC: 9.8 G/DL — LOW (ref 13–17)
MCHC RBC-ENTMCNC: 30.4 PG — SIGNIFICANT CHANGE UP (ref 27–34)
MCHC RBC-ENTMCNC: 32.5 GM/DL — SIGNIFICANT CHANGE UP (ref 32–36)
MCV RBC AUTO: 93.8 FL — SIGNIFICANT CHANGE UP (ref 80–100)
PLATELET # BLD AUTO: 123 K/UL — LOW (ref 150–400)
POTASSIUM SERPL-MCNC: 4.4 MMOL/L — SIGNIFICANT CHANGE UP (ref 3.5–5.3)
POTASSIUM SERPL-SCNC: 4.4 MMOL/L — SIGNIFICANT CHANGE UP (ref 3.5–5.3)
PROT SERPL-MCNC: 6.1 GM/DL — SIGNIFICANT CHANGE UP (ref 6–8.3)
RBC # BLD: 3.22 M/UL — LOW (ref 4.2–5.8)
RBC # FLD: 15.4 % — HIGH (ref 10.3–14.5)
SODIUM SERPL-SCNC: 139 MMOL/L — SIGNIFICANT CHANGE UP (ref 135–145)
WBC # BLD: 6.74 K/UL — SIGNIFICANT CHANGE UP (ref 3.8–10.5)
WBC # FLD AUTO: 6.74 K/UL — SIGNIFICANT CHANGE UP (ref 3.8–10.5)

## 2021-09-23 PROCEDURE — 99232 SBSQ HOSP IP/OBS MODERATE 35: CPT | Mod: GC

## 2021-09-23 RX ADMIN — LACTULOSE 10 GRAM(S): 10 SOLUTION ORAL at 09:51

## 2021-09-23 RX ADMIN — Medication 40 MILLIGRAM(S): at 23:19

## 2021-09-23 RX ADMIN — MIDODRINE HYDROCHLORIDE 15 MILLIGRAM(S): 2.5 TABLET ORAL at 06:01

## 2021-09-23 RX ADMIN — Medication 100 MILLIGRAM(S): at 09:51

## 2021-09-23 RX ADMIN — MIDODRINE HYDROCHLORIDE 15 MILLIGRAM(S): 2.5 TABLET ORAL at 17:12

## 2021-09-23 RX ADMIN — PYRIDOSTIGMINE BROMIDE 60 MILLIGRAM(S): 60 SOLUTION ORAL at 09:51

## 2021-09-23 RX ADMIN — PYRIDOSTIGMINE BROMIDE 60 MILLIGRAM(S): 60 SOLUTION ORAL at 22:05

## 2021-09-23 RX ADMIN — Medication 40 MILLIGRAM(S): at 11:48

## 2021-09-23 RX ADMIN — DABIGATRAN ETEXILATE MESYLATE 150 MILLIGRAM(S): 150 CAPSULE ORAL at 22:05

## 2021-09-23 RX ADMIN — Medication 1 MILLIGRAM(S): at 09:51

## 2021-09-23 RX ADMIN — TAMSULOSIN HYDROCHLORIDE 0.4 MILLIGRAM(S): 0.4 CAPSULE ORAL at 22:05

## 2021-09-23 RX ADMIN — PANTOPRAZOLE SODIUM 40 MILLIGRAM(S): 20 TABLET, DELAYED RELEASE ORAL at 06:01

## 2021-09-23 RX ADMIN — Medication 40 MILLIGRAM(S): at 17:12

## 2021-09-23 RX ADMIN — DABIGATRAN ETEXILATE MESYLATE 150 MILLIGRAM(S): 150 CAPSULE ORAL at 09:51

## 2021-09-23 RX ADMIN — TIOTROPIUM BROMIDE 1 CAPSULE(S): 18 CAPSULE ORAL; RESPIRATORY (INHALATION) at 09:06

## 2021-09-23 RX ADMIN — MIDODRINE HYDROCHLORIDE 15 MILLIGRAM(S): 2.5 TABLET ORAL at 11:48

## 2021-09-23 RX ADMIN — Medication 40 MILLIGRAM(S): at 06:01

## 2021-09-23 NOTE — DISCHARGE NOTE PROVIDER - CARE PROVIDER_API CALL
Cristo Bar)  Medicine  [facility] [address] [place] [address]  Phone: [phone]  Fax: [phone]  Follow Up Time:     Darek Mckinley)  Cardiac Electrophysiology; Cardiovascular Disease  [address] [place] [address]  Phone: [phone]  Fax: [phone]  Follow Up Time:

## 2021-09-23 NOTE — PROGRESS NOTE ADULT - ASSESSMENT
Pt has been seen and examined with FP resident, resident supervised agree with a/p       Patient is a 55y old  Male who presents with a chief complaint of Near syncope (20 Sep 2021 16:40)        PHYSICAL EXAM:    -rs-aeeb, cta  -cvs-s1s2 normal   -p/a-soft,bs+      A/P    # d/c today with further management as an outpt, time spent 45 minutes

## 2021-09-23 NOTE — DISCHARGE NOTE PROVIDER - NSDCMRMEDTOKEN_GEN_ALL_CORE_FT
dabigatran 150 mg oral capsule: 1 cap(s) orally every 12 hours  folic acid 1 mg oral tablet: 1 tab(s) orally once a day  midodrine 5 mg oral tablet: 3 tab(s) orally 3 times a day  pantoprazole 40 mg oral delayed release tablet: 1 tab(s) orally once a day (before a meal)  predniSONE 20 mg oral tablet: 2 tab(s) orally every 6 hours  pyridostigmine 60 mg oral tablet: 1 tab(s) orally 2 times a day  tamsulosin 0.4 mg oral capsule: 1 cap(s) orally once a day (at bedtime)  thiamine 100 mg oral tablet: 1 tab(s) orally once a day  tiotropium 18 mcg inhalation capsule: 1 cap(s) inhaled once a day   dabigatran 150 mg oral capsule: 1 cap(s) orally every 12 hours  folic acid 1 mg oral tablet: 1 tab(s) orally once a day  lactulose 10 g/15 mL oral syrup: 15 milliliter(s) orally once a day  midodrine 5 mg oral tablet: 3 tab(s) orally 3 times a day  pantoprazole 40 mg oral delayed release tablet: 1 tab(s) orally once a day (before a meal)  predniSONE 20 mg oral tablet: 2 tab(s) orally every 6 hours  pyridostigmine 60 mg oral tablet: 1 tab(s) orally 2 times a day  rifAXIMin 550 mg oral tablet: 1 tab(s) orally 2 times a day  tamsulosin 0.4 mg oral capsule: 1 cap(s) orally once a day (at bedtime)  thiamine 100 mg oral tablet: 1 tab(s) orally once a day  tiotropium 18 mcg inhalation capsule: 1 cap(s) inhaled once a day   dabigatran 150 mg oral capsule: 1 cap(s) orally every 12 hours  folic acid 1 mg oral tablet: 1 tab(s) orally once a day  lactulose 10 g/15 mL oral syrup: 15 milliliter(s) orally once a day  Mestinon 60 mg oral tablet: 1 tab(s) orally 2 times a day  midodrine 5 mg oral tablet: 3 tab(s) orally 3 times a day  Multiple Vitamins oral tablet: 1 tab(s) orally once a day  pantoprazole 40 mg oral delayed release tablet: 1 tab(s) orally once a day (before a meal)  predniSONE 20 mg oral tablet: 2 tab(s) orally 3 times a day for 7 days, than 2 tabs twice a day for 7 days, than 2 tabs daily for 7 days  rifAXIMin 550 mg oral tablet: 1 tab(s) orally 2 times a day  thiamine 100 mg oral tablet: 1 tab(s) orally once a day  tiotropium 18 mcg inhalation capsule: 1 cap(s) inhaled once a day

## 2021-09-23 NOTE — DISCHARGE NOTE PROVIDER - HOSPITAL COURSE
Patient is a 55y old male who presented to HH/ED due to hypotension and bradycardia, he had a near syncope during transfer to ED. In the ED Pts  BP-56/67 and HR- 48. His EKG reveled Junctional bradycardia and Echo was normal. Pt was given Nor-epinephrin, IV fluids and was transferred to the ICU  further management. Patient is a 55y old male who presented to HH/ED on the 09- due to hypotension and bradycardia. During transfer to ED he had experienced a near syncope . In the ED Pts  BP-56/67 and HR- 48. His EKG reveled Junctional bradycardia and Echo was normal. Pt was given Nor-epinephrin, IV fluids and was transferred to the ICU further management. In the ICU he was treated for Shock, probably nadolol induced, and he received the following medication- Midodrine, norepinephrine, hydrocortisone(Low cortisol level), heparin, lactulose, and rifaximin. Digoxin, nadolol was stopped due to junctional bradycardia and shock. During the course of his stay in the hospital he became very sensitive for Beta- blockers(Severe Hypotension and bradycardia after a low dose metoprolol). Hence, Beta blockers were withheld. Patient is a 55y old male who presented to HH/ED on the 09- due to hypotension and bradycardia. During transfer to ED he had experienced a near syncope . In the ED Pts  BP-56/67 and HR- 48. His EKG reveled Junctional bradycardia and Echo was normal. Pt was given Nor-epinephrin, IV fluids and was transferred to the ICU further management. In the ICU he was treated for Shock, probably nadolol induced, and he received the following medication- Midodrine, norepinephrine, hydrocortisone(Low cortisol level), heparin, lactulose, and rifaximin(hepatic cirrhosis ). Digoxin, nadolol was stopped due to junctional bradycardia and shock. During the course of his stay he became very sensitive for Beta- blockers(Severe Hypotension and bradycardia after a low dose metoprolol). Hence, Beta blockers were withheld. After stabilizing his condition,  he was downgraded to CICU. In CICU his bp and Hr were fluctating. Hence, he was reviewed by the Electrophysiologists and was started on pyridostigmine. Patients condition has improved and he is ready for discharge.   Before discharge pt was reviewed by the electrophysiologist, No indications for pacemaker implant at this point of time, and he is cleared for D/C. Please note that pt is not tolerating rate control medication.   After discharge pt have to follow up with the PCP and electrophysiologist for further management.               Patient is a 55y old male who presented to HH/ED on the 09- due to near syncope . In the ED Pts  BP-56/67 and HR- 48. His EKG reveled Junctional bradycardia and Echo was normal. Pt was given Nor-epinephrin, IV fluids and was transferred to the ICU further management. In the ICU he was treated for Shock, probably nadolol induced, and he received the following medication- Midodrine, norepinephrine, hydrocortisone(Low cortisol level), heparin, lactulose, and rifaximin(hepatic cirrhosis ). Digoxin, nadolol was stopped due to junctional bradycardia and shock. During the course of his stay he became very sensitive for Beta- blockers(Severe Hypotension and bradycardia after a low dose metoprolol). Hence, Beta blockers were withheld. After stabilizing his condition,  he was downgraded to CICU. In CICU his bp and Hr were fluctating. Hence, he was reviewed by the Electrophysiologists and was started on pyridostigmine. Patients condition has improved and he is ready for discharge.   Before discharge pt was reviewed by the electrophysiologist, No indications for pacemaker implant at this point of time, and he is cleared for D/C. Please note that pt is not tolerating rate control medication.   After discharge pt have to follow up with the PCP and electrophysiologist for further management.  MEDICATIONS on discharge:  dabigatran 150 milliGRAM(s) Oral every 12 hours  folic acid 1 milliGRAM(s) Oral daily  lactulose Syrup 10 Gram(s) Oral daily  midodrine. 15 milliGRAM(s) Oral three times a day  pantoprazole    Tablet 40 milliGRAM(s) Oral before breakfast  predniSONE   Tablet 40 milliGRAM(s) Oral every 6 hours  pyridostigmine 60 milliGRAM(s) Oral two times a day  rifAXIMin 550 milliGRAM(s) Oral two times a day  tamsulosin 0.4 milliGRAM(s) Oral at bedtime  thiamine 100 milliGRAM(s) Oral daily  tiotropium 18 MICROgram(s) Capsule 1 Capsule(s) Inhalation daily                     Patient is a 55y old male who presented to HH/ED on the 09- due to near syncope . In the ED Pts  BP-56/67 and HR- 48. His EKG reveled Junctional bradycardia and Echo was normal. Pt was given Nor-epinephrin, IV fluids and was transferred to the ICU for further management. In the ICU he was treated for Shock, probably nadolol induced, and he received the following medications- Midodrine, norepinephrine, hydrocortisone(Low cortisol level), heparin, lactulose, and rifaximin(hepatic cirrhosis ). Digoxin, nadolol were stopped due to junctional bradycardia and shock. During the course of his stay he became very sensitive for Beta- blockers(Severe Hypotension and bradycardia after a low dose metoprolol). Hence, Beta blockers were withheld. After stabilizing his condition,  he was downgraded to CICU. In CICU his bp and Hr were fluctating. Hence, he was reviewed by the Electrophysiologists and was started on pyridostigmine. Patients condition has improved and he hemodynamically stable and optimized for discharge.   Before discharge pt was reviewed by the electrophysiologist, No indications for pacemaker implant at this point of time, and he is cleared for D/C. Please note that pt is not tolerating rate control medication.   After discharge pt have to follow up with the PCP and electrophysiologist for further management.                       Patient is a 55y old male who presented to HH/ED on the 09- due to near syncope . In the ED Pts  BP-56/67 and HR- 48. His EKG reveled Junctional bradycardia and Echo was normal. Pt was given Nor-epinephrin, IV fluids and was transferred to the ICU for further management. In the ICU he was treated for Shock, probably nadolol induced, and he received the following medications- Midodrine, norepinephrine, hydrocortisone(Low cortisol level), heparin, lactulose, and rifaximin(hepatic cirrhosis ). Digoxin, nadolol were stopped due to junctional bradycardia and shock. During the course of his stay he became very sensitive for Beta- blockers(Severe Hypotension and bradycardia after a low dose metoprolol). Hence, Beta blockers were withheld. After stabilizing his condition,  he was downgraded to CICU. In CICU his bp and Hr were fluctating. Hence, he was reviewed by the Electrophysiologists and was started on pyridostigmine. Patients condition has improved and he hemodynamically stable and optimized for discharge.   Before discharge pt was reviewed by the electrophysiologist, No indications for pacemaker implant at this point of time, and he is cleared for D/C. Please note that pt is not tolerating rate control medication.     #Near syncope complicated by hypotensive shock - unclear etiology, potentially iatrogenic   - discussed with Dr. Mckinley - cont po midodrine with mestinone, off BBL   - off BBL due to hypotenstion    #Stress doses of steroids - taper slowly - by 40 mg weekly    # BL LE pitting edema - no DVT, evidence of venous insufficiency + steroids induced + underlying Hx of ETOH liver and heart Dx     #Afib - Continue Pradaxa 150 mg PO BID     #Liver cirrhosis - Secondary to EtOH abuse   - Continue rifaximin 550 mg PO BID     #Thrombocytopenia due to ETOH liver Dx    #GERD - Continue Pantoprazole    #Low serum calcium - normal corrected    #Hx of BPH - stop flomax due to SE profile as will contribute to hypotension    Discussed with residents team. Chart reviewed.  Pt is stable for DC home with understanding that need to follow up closely with outpatient providers  Time spent 95 min

## 2021-09-23 NOTE — DISCHARGE NOTE PROVIDER - NSDCCPCAREPLAN_GEN_ALL_CORE_FT
PRINCIPAL DISCHARGE DIAGNOSIS  Diagnosis: Bradycardia  Assessment and Plan of Treatment: You were noted to have a below average heart rate. You was treated whith medication while in the hospital and now you are stable. Any necessary immediate work up that was needed has already be done inpatient, but you will benefit from continued follow up with your primary care doctor, and if recommended, also with a Cardiologist, after discharge.      SECONDARY DISCHARGE DIAGNOSES  Diagnosis: Hypotension  Assessment and Plan of Treatment:      PRINCIPAL DISCHARGE DIAGNOSIS  Diagnosis: Near syncope  Assessment and Plan of Treatment: Your near syncope is due to severe hypotension(low blood pressure)  and bradycardia(low heart rate) probably due to Nadalol use, and was complicated by shock. Shock occurs when the body’s vital organs don't get enough oxygen-carrying blood. Without enough oxygen to fuel its parts, the body can’t work properly and death may occur. Please followo-up with  for further management. If you experience similar symptoms please calkl 911 or visit the near by Emergency department.      SECONDARY DISCHARGE DIAGNOSES  Diagnosis: Bradycardia  Assessment and Plan of Treatment: You were noted to have a below average heart rate. You was treated whith medication while in the hospital and now you are stable. Any necessary immediate work up that was needed has already be done inpatient, but you will benefit from continued follow up with your doctor.    Diagnosis: Hypotension  Assessment and Plan of Treatment: Please take medication as prescribed.    Diagnosis: Afib  Assessment and Plan of Treatment: Please take medication as prescribed.    Diagnosis: Cirrhosis  Assessment and Plan of Treatment: Please take medication as prescribed and follow-up with Gasteroenterology for further management.     PRINCIPAL DISCHARGE DIAGNOSIS  Diagnosis: Near syncope  Assessment and Plan of Treatment: Your near syncope is due to severe hypotension(low blood pressure)  and bradycardia(low heart rate) probably due to Nadalol use, and was complicated by shock. Shock occurs when the body’s vital organs don't get enough oxygen-carrying blood. Without enough oxygen to fuel its parts, the body can’t work properly and death may occur. Please followo-up with  for further management. If you experience similar symptoms please call 911 or visit the near by Emergency department.      SECONDARY DISCHARGE DIAGNOSES  Diagnosis: Hypotension  Assessment and Plan of Treatment: -Please take medication as prescribed.  -Follow up with your PCP and Cardiologist as outpatient for further managment and treatment.    Diagnosis: Afib  Assessment and Plan of Treatment: -Please take medication as prescribed.  -Follow up with your PCP and Cardiologist as outpatient for further managment and treatment.    Diagnosis: Bradycardia  Assessment and Plan of Treatment: You were noted to have a below average heart rate. You was treated whith medication while in the hospital and now you are stable. Any necessary immediate work up that was needed has already be done inpatient, but you will benefit from continued follow up with your doctor.    Diagnosis: Cirrhosis  Assessment and Plan of Treatment: Please take medication as prescribed and follow-up with Gasteroenterology for further management.

## 2021-09-23 NOTE — PROGRESS NOTE ADULT - ASSESSMENT
55M hx HTN BPH, GERD, chronic a fib  on pradaxa, alcoholic cirrhosis, EtOH abuse came to ED [facility] from [facility] rehab for evaluation of  dizziness, hypotension and near syncope.      #Near syncope complicated by shock   - Hypotension at admission could be secondary to BB treatment and possible adrenal insufficiency   - s/p treatment with levophed until 9/16/21  - Cortisol 11 ( borderline)   - Transitioned Solu-Cortef to Prednisone 40 mg q6H, monitor BP   - Continue Midodrine to 15 mg PO TID   - On propanolol 20 mg PO BID at home (confirmed by pharmacy), Unable to continue BB due to induced hypotension and bradycardia  - EP recommendations appreciated: started on pyridostigmine 60 mg BID, possible d/c home as pt is asymptomatic and hemodynamically stable   - Pt wants to go to rehab, probably he will go tomorrow.    #Afib   - On Propanolol 20 mg PO BID at home, hold now due to hypotension   - Pt with Afib Hr 70-90 at rest but had multiple episode of HR > 160 at exertion   - Digoxin was hold at admission   - EP consult appreciated  - Continue Pradaxa 150 mg PO BID     #Liver cirrhosis  - Secondary to EtOH abuse   - Continue rifaximin 550 mg PO BID   - LFT's WNL   - Avoid nephrotoxic medications     #Thrombocytopenia  - No signs of bleeding   - Mostly secondary to liver cirrhosis     #GERD  - Continue Pantoprazole    #Low serum calcium   - Calcium correction for hypoalbuminemia-8.8    #Dispo   -Pt will be discharged tomorrow. Case manger is involved in this case.    *Above discussed w Dr. Zavala

## 2021-09-23 NOTE — DISCHARGE NOTE PROVIDER - CARE PROVIDERS DIRECT ADDRESSES
[email or web address]

## 2021-09-23 NOTE — DISCHARGE NOTE PROVIDER - NSDCCAREPROVSEEN_GEN_ALL_CORE_FT
Leonard, Mary Jane Farrar, Yennifer Minor, Blaine Sheridan, Rahul Moore, Luci Corley, Quyen [place], Miguel Pickard, Noble Zavala, Anastacio Peterson, Altagracia Griffin, Venkatesh Weems, Jose Silvestre, Veronika Wright, Luciano Fox, Cristo Elmore, Chong Nunez, Ollie

## 2021-09-23 NOTE — PROGRESS NOTE ADULT - SUBJECTIVE AND OBJECTIVE BOX
HPI:  55M hx HTN BPH Gerd chronic a fib on pradaxa.  GERD alcoholic cirrhosis   First time in [place].  His hx is not really reliable and the SNF records are sparse.  States he was at [facility] in July for a few weeks after he went into alcohol withdrawal.  He does not have any recollection of his time at [facility].    He was sent to [facility] SNF for physical rehab.    In [facility] until this afternoon when her felt dizzy.  He states than he has not been eating or drinking much.    Admit with MELISSA hypotension and junctional nena cardia.  Cultured given fluids abx and started on nor-epi  noted with mild hyponatremia  He is on a bunch of diuretics nadolol and digoxin.  ,     09/23: Pt was seen by his bedside, not in acute distress. He have no complaints at this point of time. Denies chest pain, dizziness, sob, nausea or vomiting.     Vital Signs Last 24 Hrs  T(C): 36.1 (23 Sep 2021 12:37), Max: 36.6 (22 Sep 2021 19:59)  T(F): 96.9 (23 Sep 2021 12:37), Max: 97.8 (22 Sep 2021 19:59)  HR: 79 (23 Sep 2021 14:00) (67 - 125)  BP: 110/75 (23 Sep 2021 14:00) (90/48 - 129/80)  BP(mean): 85 (23 Sep 2021 14:00) (55 - 97)  RR: 15 (23 Sep 2021 14:00) (13 - 22)  SpO2: 100% (23 Sep 2021 11:02) (100% - 100%)    Physical Exam   Gen: NAD, comfortable  HENT: atraumatic head and ears, no gross abnormalities of ears, mucous membranes moist, no oral lesions, neck supple without masses/goiter/lymphadenopathy  CV: RRR,  s1/s2 heard Irregular , no M/R/G  Pulm: nl respiratory effort, CTAB, no wheezes/crackles/rhonchi  Back: no scoliosis, lordosis, or kyphosis, no tenderness  Abd: normoactive bowel sounds in all 4 quadrants, soft, nontender, nondistended, no rebound, no guarding, no masses  Extremities: no pedal edema, pedal pulses palpable   Skin: nl warm and dry, no wounds   Neuro: A&Ox3, answering questions appropriately, PERRL, EOMI, face symmetric      =======================================================  MEDICATIONS  (STANDING):  dabigatran 150 milliGRAM(s) Oral every 12 hours  folic acid 1 milliGRAM(s) Oral daily  lactulose Syrup 10 Gram(s) Oral daily  midodrine. 15 milliGRAM(s) Oral three times a day  pantoprazole    Tablet 40 milliGRAM(s) Oral before breakfast  predniSONE   Tablet 40 milliGRAM(s) Oral every 6 hours  pyridostigmine 60 milliGRAM(s) Oral two times a day  rifAXIMin 550 milliGRAM(s) Oral two times a day  tamsulosin 0.4 milliGRAM(s) Oral at bedtime  thiamine 100 milliGRAM(s) Oral daily  tiotropium 18 MICROgram(s) Capsule 1 Capsule(s) Inhalation daily    MEDICATIONS  (PRN):      =======================================================  Labs:                                   9.4    5.40  )-----------( 112      ( 22 Sep 2021 06:43 )             29.0                9.3    6.33  )-----------( 117      ( 21 Sep 2021 06:28 )             28.7     09-21    138  |  109<H>  |  16  ----------------------------<  119<H>  4.0   |  25  |  0.63    Ca    8.4<L>      21 Sep 2021 06:28    TPro  5.6<L>  /  Alb  2.4<L>  /  TBili  0.6  /  DBili  x   /  AST  22  /  ALT  12  /  AlkPhos  48  09-21      Culture - Blood (collected 09-17-21 @ 05:12)  Source: .Blood None    Culture - Blood (collected 09-17-21 @ 05:12)  Source: .Blood None    Culture - Blood (collected 09-12-21 @ 19:43)  Source: .Blood None  Final Report (09-18-21 @ 01:00):    No Growth Final    Culture - Blood (collected 09-12-21 @ 16:07)  Source: .Blood Blood-Peripheral  Final Report (09-18-21 @ 01:00):    No Growth Final      Creatinine, Serum: 0.63 mg/dL (09-21-21 @ 06:28)  Creatinine, Serum: 0.77 mg/dL (09-20-21 @ 06:29)  Creatinine, Serum: 0.67 mg/dL (09-19-21 @ 06:33)  Creatinine, Serum: 0.72 mg/dL (09-18-21 @ 05:20)  Creatinine, Serum: 0.70 mg/dL (09-17-21 @ 05:12)    Procalcitonin, Serum: 0.10 ng/mL (09-16-21 @ 05:22)          WBC Count: 6.33 K/uL (09-21-21 @ 06:28)  WBC Count: 6.09 K/uL (09-20-21 @ 06:29)  WBC Count: 7.01 K/uL (09-19-21 @ 06:33)  WBC Count: 5.78 K/uL (09-18-21 @ 05:20)  WBC Count: 4.98 K/uL (09-17-21 @ 05:09)      COVID-19 PCR: NotDetec (09-12-21 @ 16:07)      Alkaline Phosphatase, Serum: 48 U/L (09-21-21 @ 06:28)  Alkaline Phosphatase, Serum: 48 U/L (09-20-21 @ 06:29)  Alkaline Phosphatase, Serum: 48 U/L (09-19-21 @ 06:33)  Alkaline Phosphatase, Serum: 47 U/L (09-18-21 @ 05:20)  Alanine Aminotransferase (ALT/SGPT): 12 U/L (09-21-21 @ 06:28)  Alanine Aminotransferase (ALT/SGPT): 11 U/L (09-20-21 @ 06:29)  Alanine Aminotransferase (ALT/SGPT): 9 U/L (09-19-21 @ 06:33)  Alanine Aminotransferase (ALT/SGPT): 9 U/L (09-18-21 @ 05:20)  Aspartate Aminotransferase (AST/SGOT): 22 U/L (09-21-21 @ 06:28)  Aspartate Aminotransferase (AST/SGOT): 18 U/L (09-20-21 @ 06:29)  Aspartate Aminotransferase (AST/SGOT): 14 U/L (09-19-21 @ 06:33)  Aspartate Aminotransferase (AST/SGOT): 14 U/L (09-18-21 @ 05:20)  Bilirubin Total, Serum: 0.6 mg/dL (09-21-21 @ 06:28)  Bilirubin Total, Serum: 0.6 mg/dL (09-20-21 @ 06:29)  Bilirubin Total, Serum: 0.6 mg/dL (09-19-21 @ 06:33)  Bilirubin Total, Serum: 0.7 mg/dL (09-18-21 @ 05:20)              RADIOLOGY:    IMPRESSION:  No evidence of deep venous thrombosis in either lower extremity.

## 2021-09-24 LAB
ALBUMIN SERPL ELPH-MCNC: 2.8 G/DL — LOW (ref 3.3–5)
ALP SERPL-CCNC: 61 U/L — SIGNIFICANT CHANGE UP (ref 40–120)
ALT FLD-CCNC: 22 U/L — SIGNIFICANT CHANGE UP (ref 12–78)
ANION GAP SERPL CALC-SCNC: 6 MMOL/L — SIGNIFICANT CHANGE UP (ref 5–17)
AST SERPL-CCNC: 30 U/L — SIGNIFICANT CHANGE UP (ref 15–37)
BASOPHILS # BLD AUTO: 0.01 K/UL — SIGNIFICANT CHANGE UP (ref 0–0.2)
BASOPHILS NFR BLD AUTO: 0.1 % — SIGNIFICANT CHANGE UP (ref 0–2)
BILIRUB SERPL-MCNC: 0.8 MG/DL — SIGNIFICANT CHANGE UP (ref 0.2–1.2)
BUN SERPL-MCNC: 19 MG/DL — SIGNIFICANT CHANGE UP (ref 7–23)
CALCIUM SERPL-MCNC: 8.4 MG/DL — LOW (ref 8.5–10.1)
CHLORIDE SERPL-SCNC: 106 MMOL/L — SIGNIFICANT CHANGE UP (ref 96–108)
CO2 SERPL-SCNC: 24 MMOL/L — SIGNIFICANT CHANGE UP (ref 22–31)
CREAT SERPL-MCNC: 0.69 MG/DL — SIGNIFICANT CHANGE UP (ref 0.5–1.3)
EOSINOPHIL # BLD AUTO: 0 K/UL — SIGNIFICANT CHANGE UP (ref 0–0.5)
EOSINOPHIL NFR BLD AUTO: 0 % — SIGNIFICANT CHANGE UP (ref 0–6)
GLUCOSE SERPL-MCNC: 101 MG/DL — HIGH (ref 70–99)
HCT VFR BLD CALC: 34.1 % — LOW (ref 39–50)
HGB BLD-MCNC: 10.8 G/DL — LOW (ref 13–17)
IMM GRANULOCYTES NFR BLD AUTO: 0.8 % — SIGNIFICANT CHANGE UP (ref 0–1.5)
LYMPHOCYTES # BLD AUTO: 0.58 K/UL — LOW (ref 1–3.3)
LYMPHOCYTES # BLD AUTO: 7.7 % — LOW (ref 13–44)
MCHC RBC-ENTMCNC: 30.2 PG — SIGNIFICANT CHANGE UP (ref 27–34)
MCHC RBC-ENTMCNC: 31.7 GM/DL — LOW (ref 32–36)
MCV RBC AUTO: 95.3 FL — SIGNIFICANT CHANGE UP (ref 80–100)
MONOCYTES # BLD AUTO: 0.43 K/UL — SIGNIFICANT CHANGE UP (ref 0–0.9)
MONOCYTES NFR BLD AUTO: 5.7 % — SIGNIFICANT CHANGE UP (ref 2–14)
NEUTROPHILS # BLD AUTO: 6.5 K/UL — SIGNIFICANT CHANGE UP (ref 1.8–7.4)
NEUTROPHILS NFR BLD AUTO: 85.7 % — HIGH (ref 43–77)
PLATELET # BLD AUTO: 143 K/UL — LOW (ref 150–400)
POTASSIUM SERPL-MCNC: 4.1 MMOL/L — SIGNIFICANT CHANGE UP (ref 3.5–5.3)
POTASSIUM SERPL-SCNC: 4.1 MMOL/L — SIGNIFICANT CHANGE UP (ref 3.5–5.3)
PROT SERPL-MCNC: 6.5 GM/DL — SIGNIFICANT CHANGE UP (ref 6–8.3)
RBC # BLD: 3.58 M/UL — LOW (ref 4.2–5.8)
RBC # FLD: 15.7 % — HIGH (ref 10.3–14.5)
SODIUM SERPL-SCNC: 136 MMOL/L — SIGNIFICANT CHANGE UP (ref 135–145)
WBC # BLD: 7.58 K/UL — SIGNIFICANT CHANGE UP (ref 3.8–10.5)
WBC # FLD AUTO: 7.58 K/UL — SIGNIFICANT CHANGE UP (ref 3.8–10.5)

## 2021-09-24 PROCEDURE — 99232 SBSQ HOSP IP/OBS MODERATE 35: CPT | Mod: GC

## 2021-09-24 RX ORDER — MIDODRINE HYDROCHLORIDE 2.5 MG/1
3 TABLET ORAL
Qty: 0 | Refills: 0 | DISCHARGE
Start: 2021-09-24

## 2021-09-24 RX ORDER — PANTOPRAZOLE SODIUM 20 MG/1
1 TABLET, DELAYED RELEASE ORAL
Qty: 0 | Refills: 0 | DISCHARGE
Start: 2021-09-24

## 2021-09-24 RX ORDER — THIAMINE MONONITRATE (VIT B1) 100 MG
1 TABLET ORAL
Qty: 0 | Refills: 0 | DISCHARGE
Start: 2021-09-24

## 2021-09-24 RX ORDER — FOLIC ACID 0.8 MG
1 TABLET ORAL
Qty: 0 | Refills: 0 | DISCHARGE
Start: 2021-09-24

## 2021-09-24 RX ORDER — LACTULOSE 10 G/15ML
15 SOLUTION ORAL
Qty: 0 | Refills: 0 | DISCHARGE
Start: 2021-09-24

## 2021-09-24 RX ORDER — PYRIDOSTIGMINE BROMIDE 60 MG/5ML
1 SOLUTION ORAL
Qty: 0 | Refills: 0 | DISCHARGE
Start: 2021-09-24

## 2021-09-24 RX ORDER — TIOTROPIUM BROMIDE 18 UG/1
1 CAPSULE ORAL; RESPIRATORY (INHALATION)
Qty: 0 | Refills: 0 | DISCHARGE
Start: 2021-09-24

## 2021-09-24 RX ORDER — TAMSULOSIN HYDROCHLORIDE 0.4 MG/1
1 CAPSULE ORAL
Qty: 0 | Refills: 0 | DISCHARGE
Start: 2021-09-24

## 2021-09-24 RX ORDER — DABIGATRAN ETEXILATE MESYLATE 150 MG/1
1 CAPSULE ORAL
Qty: 0 | Refills: 0 | DISCHARGE
Start: 2021-09-24

## 2021-09-24 RX ADMIN — Medication 40 MILLIGRAM(S): at 23:25

## 2021-09-24 RX ADMIN — Medication 100 MILLIGRAM(S): at 11:04

## 2021-09-24 RX ADMIN — DABIGATRAN ETEXILATE MESYLATE 150 MILLIGRAM(S): 150 CAPSULE ORAL at 21:44

## 2021-09-24 RX ADMIN — MIDODRINE HYDROCHLORIDE 15 MILLIGRAM(S): 2.5 TABLET ORAL at 18:16

## 2021-09-24 RX ADMIN — PYRIDOSTIGMINE BROMIDE 60 MILLIGRAM(S): 60 SOLUTION ORAL at 11:04

## 2021-09-24 RX ADMIN — MIDODRINE HYDROCHLORIDE 15 MILLIGRAM(S): 2.5 TABLET ORAL at 12:58

## 2021-09-24 RX ADMIN — PANTOPRAZOLE SODIUM 40 MILLIGRAM(S): 20 TABLET, DELAYED RELEASE ORAL at 06:05

## 2021-09-24 RX ADMIN — Medication 1 MILLIGRAM(S): at 11:04

## 2021-09-24 RX ADMIN — PYRIDOSTIGMINE BROMIDE 60 MILLIGRAM(S): 60 SOLUTION ORAL at 21:44

## 2021-09-24 RX ADMIN — TAMSULOSIN HYDROCHLORIDE 0.4 MILLIGRAM(S): 0.4 CAPSULE ORAL at 21:44

## 2021-09-24 RX ADMIN — DABIGATRAN ETEXILATE MESYLATE 150 MILLIGRAM(S): 150 CAPSULE ORAL at 11:04

## 2021-09-24 RX ADMIN — Medication 40 MILLIGRAM(S): at 06:05

## 2021-09-24 RX ADMIN — Medication 40 MILLIGRAM(S): at 18:10

## 2021-09-24 RX ADMIN — TIOTROPIUM BROMIDE 1 CAPSULE(S): 18 CAPSULE ORAL; RESPIRATORY (INHALATION) at 10:03

## 2021-09-24 RX ADMIN — Medication 40 MILLIGRAM(S): at 12:58

## 2021-09-24 RX ADMIN — LACTULOSE 10 GRAM(S): 10 SOLUTION ORAL at 11:04

## 2021-09-24 RX ADMIN — MIDODRINE HYDROCHLORIDE 15 MILLIGRAM(S): 2.5 TABLET ORAL at 06:05

## 2021-09-24 NOTE — PROGRESS NOTE ADULT - ASSESSMENT
55M hx HTN BPH, GERD, chronic a fib  on pradaxa, alcoholic cirrhosis, EtOH abuse came to ED [facility] from [facility] rehab for evaluation of  dizziness, hypotension and near syncope.      #Near syncope complicated by shock   - Hypotension at admission could be secondary to BB treatment and possible adrenal insufficiency   - Transitioned Solu-Cortef to Prednisone 40 mg q6H, monitor BP   - Continue Midodrine to 15 mg PO TID   - On propanolol 20 mg PO BID at home (confirmed by pharmacy), Unable to continue BB due to induced hypotension and bradycardia  - EP recommendations appreciated: started on pyridostigmine 60 mg BID,  d/c home as pt is asymptomatic and hemodynamically stable   - Pt wants to go to rehab, probably he will go tomorrow, waiting for a bed.    #Afib   - On Propanolol 20 mg PO BID at home, hold now due to hypotension   - Pt with Afib Hr 70-90 at rest but had multiple episode of HR > 160 at exertion   - Digoxin was hold at admission   - EP consult appreciated  - Continue Pradaxa 150 mg PO BID     #Liver cirrhosis  - Secondary to EtOH abuse   - Continue rifaximin 550 mg PO BID   - LFT's WNL   - Avoid nephrotoxic medications     #Thrombocytopenia  - No signs of bleeding   - Mostly secondary to liver cirrhosis     #GERD  - Continue Pantoprazole    #Low serum calcium   - Calcium correction for hypoalbuminemia-8.8    #Dispo   -Pt was supposed to discharge today. However, he did not find an available bed in the rehab.  is involved in this case.   *Above discussed w Dr. Zavala

## 2021-09-24 NOTE — PROGRESS NOTE ADULT - SUBJECTIVE AND OBJECTIVE BOX
HPI:  55M hx HTN BPH Gerd chronic a fib on pradaxa.  GERD alcoholic cirrhosis   First time in [place].  His hx is not really reliable and the SNF records are sparse.  [place] [facility] [place] after he went into alcohol withdrawal.  He does not have any recollection of his time [place] [facility]    He was sent to [facility] for physical rehab.    In [facility] until this afternoon when her felt dizzy.  He states than he has not been eating or drinking much.    Admit with MELISSA hypotension and junctional nena cardia.  Cultured given fluids abx and started on nor-epi  noted with mild hyponatremia  He is on a bunch of diuretics nadolol and digoxin.  ,     09/23: Pt was seen by his bedside, not in acute distress. He have no complaints at this point of time. Denies chest pain, dizziness, sob, nausea or vomiting. Pt is still waiting for a bed in rehab, will be discharged as soon as a bed becomes available.   Vital Signs Last 24 Hrs  T(C): 36.2 (24 Sep 2021 15:42), Max: 36.3 (23 Sep 2021 20:35)  T(F): 97.1 (24 Sep 2021 15:42), Max: 97.4 (23 Sep 2021 20:35)  HR: 78 (24 Sep 2021 09:00) (78 - 84)  BP: 113/77 (24 Sep 2021 08:00) (99/61 - 127/80)  BP(mean): 85 (24 Sep 2021 08:00) (69 - 93)  RR: 16 (24 Sep 2021 09:00) (13 - 17)  SpO2: --    Physical Exam   Gen: NAD, comfortable  HENT: atraumatic head and ears, no gross abnormalities of ears, mucous membranes moist, no oral lesions, neck supple without masses/goiter/lymphadenopathy  CV: RRR,  s1/s2 heard Irregular , no M/R/G  Pulm: nl respiratory effort, CTAB, no wheezes/crackles/rhonchi  Back: no scoliosis, lordosis, or kyphosis, no tenderness  Abd: normoactive bowel sounds in all 4 quadrants, soft, nontender, nondistended, no rebound, no guarding, no masses  Extremities: no pedal edema, pedal pulses palpable   Skin: nl warm and dry, no wounds   Neuro: A&Ox3, answering questions appropriately, PERRL, EOMI, face symmetric      =======================================================  MEDICATIONS  (STANDING):  dabigatran 150 milliGRAM(s) Oral every 12 hours  folic acid 1 milliGRAM(s) Oral daily  lactulose Syrup 10 Gram(s) Oral daily  midodrine. 15 milliGRAM(s) Oral three times a day  pantoprazole    Tablet 40 milliGRAM(s) Oral before breakfast  predniSONE   Tablet 40 milliGRAM(s) Oral every 6 hours  pyridostigmine 60 milliGRAM(s) Oral two times a day  rifAXIMin 550 milliGRAM(s) Oral two times a day  tamsulosin 0.4 milliGRAM(s) Oral at bedtime  thiamine 100 milliGRAM(s) Oral daily  tiotropium 18 MICROgram(s) Capsule 1 Capsule(s) Inhalation daily    MEDICATIONS  (PRN):      =======================================================  Labs:                        10.8   7.58  )-----------( 143      ( 24 Sep 2021 07:19 )             34.1   09-24    136  |  106  |  19  ----------------------------<  101<H>  4.1   |  24  |  0.69    Ca    8.4<L>      24 Sep 2021 07:19    TPro  6.5  /  Alb  2.8<L>  /  TBili  0.8  /  DBili  x   /  AST  30  /  ALT  22  /  AlkPhos  61  09-24                                     9.4    5.40  )-----------( 112      ( 22 Sep 2021 06:43 )             29.0                9.3    6.33  )-----------( 117      ( 21 Sep 2021 06:28 )             28.7     09-21    138  |  109<H>  |  16  ----------------------------<  119<H>  4.0   |  25  |  0.63    Ca    8.4<L>      21 Sep 2021 06:28    TPro  5.6<L>  /  Alb  2.4<L>  /  TBili  0.6  /  DBili  x   /  AST  22  /  ALT  12  /  AlkPhos  48  09-21      Culture - Blood (collected 09-17-21 @ 05:12)  Source: .Blood None    Culture - Blood (collected 09-17-21 @ 05:12)  Source: .Blood None    Culture - Blood (collected 09-12-21 @ 19:43)  Source: .Blood None  Final Report (09-18-21 @ 01:00):    No Growth Final    Culture - Blood (collected 09-12-21 @ 16:07)  Source: .Blood Blood-Peripheral  Final Report (09-18-21 @ 01:00):    No Growth Final      Creatinine, Serum: 0.63 mg/dL (09-21-21 @ 06:28)  Creatinine, Serum: 0.77 mg/dL (09-20-21 @ 06:29)  Creatinine, Serum: 0.67 mg/dL (09-19-21 @ 06:33)  Creatinine, Serum: 0.72 mg/dL (09-18-21 @ 05:20)  Creatinine, Serum: 0.70 mg/dL (09-17-21 @ 05:12)    Procalcitonin, Serum: 0.10 ng/mL (09-16-21 @ 05:22)          WBC Count: 6.33 K/uL (09-21-21 @ 06:28)  WBC Count: 6.09 K/uL (09-20-21 @ 06:29)  WBC Count: 7.01 K/uL (09-19-21 @ 06:33)  WBC Count: 5.78 K/uL (09-18-21 @ 05:20)  WBC Count: 4.98 K/uL (09-17-21 @ 05:09)      COVID-19 PCR: NotDetec (09-12-21 @ 16:07)      Alkaline Phosphatase, Serum: 48 U/L (09-21-21 @ 06:28)  Alkaline Phosphatase, Serum: 48 U/L (09-20-21 @ 06:29)  Alkaline Phosphatase, Serum: 48 U/L (09-19-21 @ 06:33)  Alkaline Phosphatase, Serum: 47 U/L (09-18-21 @ 05:20)  Alanine Aminotransferase (ALT/SGPT): 12 U/L (09-21-21 @ 06:28)  Alanine Aminotransferase (ALT/SGPT): 11 U/L (09-20-21 @ 06:29)  Alanine Aminotransferase (ALT/SGPT): 9 U/L (09-19-21 @ 06:33)  Alanine Aminotransferase (ALT/SGPT): 9 U/L (09-18-21 @ 05:20)  Aspartate Aminotransferase (AST/SGOT): 22 U/L (09-21-21 @ 06:28)  Aspartate Aminotransferase (AST/SGOT): 18 U/L (09-20-21 @ 06:29)  Aspartate Aminotransferase (AST/SGOT): 14 U/L (09-19-21 @ 06:33)  Aspartate Aminotransferase (AST/SGOT): 14 U/L (09-18-21 @ 05:20)  Bilirubin Total, Serum: 0.6 mg/dL (09-21-21 @ 06:28)  Bilirubin Total, Serum: 0.6 mg/dL (09-20-21 @ 06:29)  Bilirubin Total, Serum: 0.6 mg/dL (09-19-21 @ 06:33)  Bilirubin Total, Serum: 0.7 mg/dL (09-18-21 @ 05:20)              RADIOLOGY:    IMPRESSION:  No evidence of deep venous thrombosis in either lower extremity.

## 2021-09-25 LAB
ALBUMIN SERPL ELPH-MCNC: 2.4 G/DL — LOW (ref 3.3–5)
ALP SERPL-CCNC: 43 U/L — SIGNIFICANT CHANGE UP (ref 40–120)
ALT FLD-CCNC: 17 U/L — SIGNIFICANT CHANGE UP (ref 12–78)
ANION GAP SERPL CALC-SCNC: 5 MMOL/L — SIGNIFICANT CHANGE UP (ref 5–17)
AST SERPL-CCNC: 22 U/L — SIGNIFICANT CHANGE UP (ref 15–37)
BILIRUB SERPL-MCNC: 0.8 MG/DL — SIGNIFICANT CHANGE UP (ref 0.2–1.2)
BUN SERPL-MCNC: 25 MG/DL — HIGH (ref 7–23)
CALCIUM SERPL-MCNC: 8.1 MG/DL — LOW (ref 8.5–10.1)
CHLORIDE SERPL-SCNC: 108 MMOL/L — SIGNIFICANT CHANGE UP (ref 96–108)
CO2 SERPL-SCNC: 25 MMOL/L — SIGNIFICANT CHANGE UP (ref 22–31)
CREAT SERPL-MCNC: 0.71 MG/DL — SIGNIFICANT CHANGE UP (ref 0.5–1.3)
GLUCOSE SERPL-MCNC: 113 MG/DL — HIGH (ref 70–99)
HCT VFR BLD CALC: 30.4 % — LOW (ref 39–50)
HGB BLD-MCNC: 9.9 G/DL — LOW (ref 13–17)
MCHC RBC-ENTMCNC: 30.7 PG — SIGNIFICANT CHANGE UP (ref 27–34)
MCHC RBC-ENTMCNC: 32.6 GM/DL — SIGNIFICANT CHANGE UP (ref 32–36)
MCV RBC AUTO: 94.4 FL — SIGNIFICANT CHANGE UP (ref 80–100)
PLATELET # BLD AUTO: 118 K/UL — LOW (ref 150–400)
POTASSIUM SERPL-MCNC: 5 MMOL/L — SIGNIFICANT CHANGE UP (ref 3.5–5.3)
POTASSIUM SERPL-SCNC: 5 MMOL/L — SIGNIFICANT CHANGE UP (ref 3.5–5.3)
PROT SERPL-MCNC: 5.9 GM/DL — LOW (ref 6–8.3)
RBC # BLD: 3.22 M/UL — LOW (ref 4.2–5.8)
RBC # FLD: 15.6 % — HIGH (ref 10.3–14.5)
SODIUM SERPL-SCNC: 138 MMOL/L — SIGNIFICANT CHANGE UP (ref 135–145)
WBC # BLD: 5.63 K/UL — SIGNIFICANT CHANGE UP (ref 3.8–10.5)
WBC # FLD AUTO: 5.63 K/UL — SIGNIFICANT CHANGE UP (ref 3.8–10.5)

## 2021-09-25 PROCEDURE — 99233 SBSQ HOSP IP/OBS HIGH 50: CPT | Mod: GC

## 2021-09-25 PROCEDURE — 93971 EXTREMITY STUDY: CPT | Mod: 26,LT

## 2021-09-25 RX ADMIN — TIOTROPIUM BROMIDE 1 CAPSULE(S): 18 CAPSULE ORAL; RESPIRATORY (INHALATION) at 08:50

## 2021-09-25 RX ADMIN — DABIGATRAN ETEXILATE MESYLATE 150 MILLIGRAM(S): 150 CAPSULE ORAL at 21:36

## 2021-09-25 RX ADMIN — DABIGATRAN ETEXILATE MESYLATE 150 MILLIGRAM(S): 150 CAPSULE ORAL at 10:12

## 2021-09-25 RX ADMIN — LACTULOSE 10 GRAM(S): 10 SOLUTION ORAL at 10:12

## 2021-09-25 RX ADMIN — Medication 40 MILLIGRAM(S): at 12:11

## 2021-09-25 RX ADMIN — MIDODRINE HYDROCHLORIDE 15 MILLIGRAM(S): 2.5 TABLET ORAL at 06:30

## 2021-09-25 RX ADMIN — MIDODRINE HYDROCHLORIDE 15 MILLIGRAM(S): 2.5 TABLET ORAL at 12:10

## 2021-09-25 RX ADMIN — TAMSULOSIN HYDROCHLORIDE 0.4 MILLIGRAM(S): 0.4 CAPSULE ORAL at 21:38

## 2021-09-25 RX ADMIN — Medication 40 MILLIGRAM(S): at 18:39

## 2021-09-25 RX ADMIN — PYRIDOSTIGMINE BROMIDE 60 MILLIGRAM(S): 60 SOLUTION ORAL at 10:12

## 2021-09-25 RX ADMIN — Medication 1 MILLIGRAM(S): at 10:12

## 2021-09-25 RX ADMIN — PYRIDOSTIGMINE BROMIDE 60 MILLIGRAM(S): 60 SOLUTION ORAL at 21:37

## 2021-09-25 RX ADMIN — PANTOPRAZOLE SODIUM 40 MILLIGRAM(S): 20 TABLET, DELAYED RELEASE ORAL at 06:31

## 2021-09-25 RX ADMIN — MIDODRINE HYDROCHLORIDE 15 MILLIGRAM(S): 2.5 TABLET ORAL at 18:39

## 2021-09-25 RX ADMIN — Medication 40 MILLIGRAM(S): at 23:08

## 2021-09-25 RX ADMIN — Medication 40 MILLIGRAM(S): at 06:33

## 2021-09-25 RX ADMIN — Medication 100 MILLIGRAM(S): at 10:12

## 2021-09-25 NOTE — PROGRESS NOTE ADULT - SUBJECTIVE AND OBJECTIVE BOX
HPI:  55M hx HTN BPH Gerd chronic a fib on pradaxa.  GERD alcoholic cirrhosis .First time in [place].  His hx is not really reliable and the SNF records are sparse.  States he was at [facility] in [place] for a few weeks after he went into alcohol withdrawal.  He does not have any recollection of his time at [facility]. He was sent to [facility] SNF for physical rehab. In [facility] until this afternoon when her felt dizzy.  He states than he has not been eating or drinking much.  Admit with MELISSA hypotension and junctional nena cardia.  Cultured given fluids abx and started on nor-epi  noted with mild hyponatremia  He is on a bunch of diuretics nadolol and digoxin.  ,     Pt was seen by his bedside, not in acute distress. Complaining of right foot swelling. Denies chest pain, dizziness, sob, nausea or vomiting.     Vital Signs Last 24 Hrs  T(C): 36.1 (25 Sep 2021 12:45), Max: 36.1 (25 Sep 2021 00:35)  T(F): 97 (25 Sep 2021 12:45), Max: 97 (25 Sep 2021 06:50)  HR: 81 (25 Sep 2021 12:00) (73 - 129)  BP: 113/79 (25 Sep 2021 12:00) (101/59 - 119/72)  BP(mean): 87 (25 Sep 2021 12:00) (69 - 87)  RR: 13 (25 Sep 2021 12:00) (11 - 20)  SpO2: --    Physical Exam   Gen: NAD, comfortable  HENT: atraumatic head and ears  CV: RRR,  s1/s2 heard Irregular   Pulm: nl respiratory effort, CTAB, no wheezes/crackles/rhonchi  Back: no scoliosis, lordosis, or kyphosis, no tenderness  Abd: normoactive bowel sounds in all 4 quadrants, soft, nontender, nondistended, no rebound, no guarding, no masses  Extremities: Mild swelling is noticed in the right foot, no visible signs of injury.   Neuro: A&Ox3, answering questions appropriately      =======================================================  MEDICATIONS  (STANDING):  dabigatran 150 milliGRAM(s) Oral every 12 hours  folic acid 1 milliGRAM(s) Oral daily  lactulose Syrup 10 Gram(s) Oral daily  midodrine. 15 milliGRAM(s) Oral three times a day  pantoprazole    Tablet 40 milliGRAM(s) Oral before breakfast  predniSONE   Tablet 40 milliGRAM(s) Oral every 6 hours  pyridostigmine 60 milliGRAM(s) Oral two times a day  rifAXIMin 550 milliGRAM(s) Oral two times a day  tamsulosin 0.4 milliGRAM(s) Oral at bedtime  thiamine 100 milliGRAM(s) Oral daily  tiotropium 18 MICROgram(s) Capsule 1 Capsule(s) Inhalation daily    MEDICATIONS  (PRN):        =======================================================   Labs-                     9.9    5.63  )-----------( 118      ( 25 Sep 2021 06:16 )             30.4   09-25    138  |  108  |  25<H>  ----------------------------<  113<H>  5.0   |  25  |  0.71    Ca    8.1<L>      25 Sep 2021 06:16    TPro  5.9<L>  /  Alb  2.4<L>  /  TBili  0.8  /  DBili  x   /  AST  22  /  ALT  17  /  AlkPhos  43  09-25    RADIOLOGY:    IMPRESSION:  No evidence of deep venous thrombosis in either lower extremity.

## 2021-09-25 NOTE — PROGRESS NOTE ADULT - ASSESSMENT
55M hx HTN BPH, GERD, chronic a fib  on pradaxa, alcoholic cirrhosis, EtOH abuse came to ED [facility] from [facility] rehab for evaluation of  dizziness, hypotension and near syncope.      #Near syncope complicated by shock   - Hypotension at admission could be secondary to BB treatment and possible adrenal insufficiency   - Transitioned Solu-Cortef to Prednisone 40 mg q6H, monitor BP   - Continue Midodrine to 15 mg PO TID   - On propanolol 20 mg PO BID at home (confirmed by pharmacy), Unable to continue BB due to induced hypotension and bradycardia  - EP recommendations appreciated: started on pyridostigmine 60 mg BID    #Left foot swelling  -Probably due to liver and heart disease  -Doppler was ordered today and was negative for DVT  -We will continue to monitor him closely    #Afib   - On Propanolol 20 mg PO BID at home, hold now due to hypotension   - Pt with Afib Hr 70-90 at rest but had multiple episode of HR > 160 at exertion   - Digoxin was hold at admission   - EP consult appreciated  - Continue Pradaxa 150 mg PO BID     #Liver cirrhosis  - Secondary to EtOH abuse   - Continue rifaximin 550 mg PO BID   - LFT's WNL   - Avoid nephrotoxic medications     #Thrombocytopenia  - No signs of bleeding   - Mostly secondary to liver cirrhosis     #GERD  - Continue Pantoprazole    #Low serum calcium   - Calcium correction for hypoalbuminemia-8.8    #Dispo   -Pt is still waiting for a place in rehab center. Possible discharge on Monday.  is actively involved in this case.  *Above discussed w Dr. Covarrubias

## 2021-09-26 LAB
ANION GAP SERPL CALC-SCNC: 6 MMOL/L — SIGNIFICANT CHANGE UP (ref 5–17)
BUN SERPL-MCNC: 25 MG/DL — HIGH (ref 7–23)
CALCIUM SERPL-MCNC: 8 MG/DL — LOW (ref 8.5–10.1)
CHLORIDE SERPL-SCNC: 108 MMOL/L — SIGNIFICANT CHANGE UP (ref 96–108)
CO2 SERPL-SCNC: 22 MMOL/L — SIGNIFICANT CHANGE UP (ref 22–31)
CREAT SERPL-MCNC: 0.63 MG/DL — SIGNIFICANT CHANGE UP (ref 0.5–1.3)
GLUCOSE SERPL-MCNC: 137 MG/DL — HIGH (ref 70–99)
HCT VFR BLD CALC: 30.6 % — LOW (ref 39–50)
HGB BLD-MCNC: 9.8 G/DL — LOW (ref 13–17)
MCHC RBC-ENTMCNC: 30.1 PG — SIGNIFICANT CHANGE UP (ref 27–34)
MCHC RBC-ENTMCNC: 32 GM/DL — SIGNIFICANT CHANGE UP (ref 32–36)
MCV RBC AUTO: 93.9 FL — SIGNIFICANT CHANGE UP (ref 80–100)
PLATELET # BLD AUTO: 106 K/UL — LOW (ref 150–400)
POTASSIUM SERPL-MCNC: 4.1 MMOL/L — SIGNIFICANT CHANGE UP (ref 3.5–5.3)
POTASSIUM SERPL-SCNC: 4.1 MMOL/L — SIGNIFICANT CHANGE UP (ref 3.5–5.3)
RBC # BLD: 3.26 M/UL — LOW (ref 4.2–5.8)
RBC # FLD: 15.8 % — HIGH (ref 10.3–14.5)
SODIUM SERPL-SCNC: 136 MMOL/L — SIGNIFICANT CHANGE UP (ref 135–145)
WBC # BLD: 4.74 K/UL — SIGNIFICANT CHANGE UP (ref 3.8–10.5)
WBC # FLD AUTO: 4.74 K/UL — SIGNIFICANT CHANGE UP (ref 3.8–10.5)

## 2021-09-26 PROCEDURE — 99232 SBSQ HOSP IP/OBS MODERATE 35: CPT | Mod: GC

## 2021-09-26 RX ADMIN — Medication 1 MILLIGRAM(S): at 09:13

## 2021-09-26 RX ADMIN — Medication 40 MILLIGRAM(S): at 11:46

## 2021-09-26 RX ADMIN — PYRIDOSTIGMINE BROMIDE 60 MILLIGRAM(S): 60 SOLUTION ORAL at 09:12

## 2021-09-26 RX ADMIN — Medication 40 MILLIGRAM(S): at 17:29

## 2021-09-26 RX ADMIN — LACTULOSE 10 GRAM(S): 10 SOLUTION ORAL at 09:13

## 2021-09-26 RX ADMIN — TAMSULOSIN HYDROCHLORIDE 0.4 MILLIGRAM(S): 0.4 CAPSULE ORAL at 21:16

## 2021-09-26 RX ADMIN — Medication 40 MILLIGRAM(S): at 23:59

## 2021-09-26 RX ADMIN — DABIGATRAN ETEXILATE MESYLATE 150 MILLIGRAM(S): 150 CAPSULE ORAL at 09:13

## 2021-09-26 RX ADMIN — Medication 40 MILLIGRAM(S): at 06:13

## 2021-09-26 RX ADMIN — Medication 100 MILLIGRAM(S): at 09:12

## 2021-09-26 RX ADMIN — TIOTROPIUM BROMIDE 1 CAPSULE(S): 18 CAPSULE ORAL; RESPIRATORY (INHALATION) at 08:44

## 2021-09-26 RX ADMIN — PYRIDOSTIGMINE BROMIDE 60 MILLIGRAM(S): 60 SOLUTION ORAL at 21:15

## 2021-09-26 RX ADMIN — DABIGATRAN ETEXILATE MESYLATE 150 MILLIGRAM(S): 150 CAPSULE ORAL at 21:15

## 2021-09-26 RX ADMIN — MIDODRINE HYDROCHLORIDE 15 MILLIGRAM(S): 2.5 TABLET ORAL at 06:12

## 2021-09-26 RX ADMIN — PANTOPRAZOLE SODIUM 40 MILLIGRAM(S): 20 TABLET, DELAYED RELEASE ORAL at 06:13

## 2021-09-26 NOTE — PROGRESS NOTE ADULT - ASSESSMENT
55M hx HTN BPH, GERD, chronic a fib  on pradaxa, alcoholic cirrhosis, EtOH abuse came to ED [facility] from [facility] rehab for evaluation of  dizziness, hypotension and near syncope.      #Near syncope complicated by shock   - Hypotension at admission could be secondary to BB treatment and possible adrenal insufficiency   - Transitioned Solu-Cortef to Prednisone 40 mg q6H, monitor BP - monitor serum glucose in the setting of steroid use  - Continue Midodrine to 15 mg PO TID -  - On propanolol 20 mg PO BID at home (confirmed by pharmacy), Unable to continue BB due to induced hypotension and bradycardia  - EP recommendations appreciated: started on pyridostigmine 60 mg BID    #Left foot swelling  -likely due to chronic deconditioning, liver and heart disease  - no signs of acute infection  -Doppler negative for DVT  -We will continue to monitor him closely    #Afib   - On Propanolol 20 mg PO BID at home, hold now due to hypotension   - Pt with Afib Hr 70-90 at rest but had multiple episode of HR > 160 at exertion   - Digoxin was hold at admission   - EP consult appreciated  - Continue Pradaxa 150 mg PO BID     #Liver cirrhosis  - Secondary to EtOH abuse   - Continue rifaximin 550 mg PO BID   - LFT's WNL   - Avoid nephrotoxic medications     #Thrombocytopenia  - No signs of bleeding   - Mostly secondary to liver cirrhosis     #GERD  - Continue Pantoprazole    #Low serum calcium   - Calcium correction for hypoalbuminemia-8.8    #Dispo   -Pt is still waiting for a place in rehab center. Possible discharge on Monday.  is actively involved in this case.      Above discussed w Dr. Covarrubias

## 2021-09-26 NOTE — PROGRESS NOTE ADULT - SUBJECTIVE AND OBJECTIVE BOX
55M hx HTN BPH Gerd chronic a fib on pradaxa.  GERD [facility] and [facility]adycardia, likely medication induced for which he was admitted.     SUBJECTIVE: Patient seen and examined on rounds today. Reports history of left extremity ladder injury- notes some mild persistent swelling of leg. No fevers, chills, rash    REVIEW OF SYSTEMS:  All other review of systems is negative unless indicated above    Vital Signs Last 24 Hrs  T(C): 35.8 (26 Sep 2021 16:29), Max: 36.3 (25 Sep 2021 23:57)  T(F): 96.5 (26 Sep 2021 16:29), Max: 97.4 (25 Sep 2021 23:57)  HR: 103 (26 Sep 2021 11:40) (81 - 103)  BP: 124/89 (26 Sep 2021 11:40) (96/57 - 138/95)  BP(mean): 97 (26 Sep 2021 11:40) (64 - 105)  RR: 15 (26 Sep 2021 11:40) (14 - 19)  SpO2: 100% (26 Sep 2021 11:40) (100% - 100%)      PHYSICAL EXAM:  Constitutional: NAD, awake and alert  HEENT: PERR, EOMI, Normal Hearing, MMM  Neck: Soft and supple, No LAD, No JVD  Respiratory: Breath sounds are clear bilaterally, No wheezing, rales or rhonchi  Cardiovascular: S1 and S2, regular rate and rhythm, no Murmurs, gallops or rubs  Gastrointestinal: Bowel Sounds present, soft, distended  Extremities: 2+ pitting edema of B/L LE, L>E  Vascular: 2+ peripheral pulses  Neurological: A/O x 3, no focal deficits  Musculoskeletal: 5/5 strength b/l upper and lower extremities      MEDICATIONS:  MEDICATIONS  (STANDING):  dabigatran 150 milliGRAM(s) Oral every 12 hours  folic acid 1 milliGRAM(s) Oral daily  lactulose Syrup 10 Gram(s) Oral daily  midodrine. 15 milliGRAM(s) Oral three times a day  pantoprazole    Tablet 40 milliGRAM(s) Oral before breakfast  predniSONE   Tablet 40 milliGRAM(s) Oral every 6 hours  pyridostigmine 60 milliGRAM(s) Oral two times a day  rifAXIMin 550 milliGRAM(s) Oral two times a day  tamsulosin 0.4 milliGRAM(s) Oral at bedtime  thiamine 100 milliGRAM(s) Oral daily  tiotropium 18 MICROgram(s) Capsule 1 Capsule(s) Inhalation daily      LABS: All Labs Reviewed:                        9.8    4.74  )-----------( 106      ( 26 Sep 2021 06:34 )             30.6     09-26    136  |  108  |  25<H>  ----------------------------<  137<H>  4.1   |  22  |  0.63    Ca    8.0<L>      26 Sep 2021 06:34    TPro  5.9<L>  /  Alb  2.4<L>  /  TBili  0.8  /  DBili  x   /  AST  22  /  ALT  17  /  AlkPhos  43  09-25

## 2021-09-26 NOTE — PROGRESS NOTE ADULT - ATTENDING COMMENTS
-rs-aeeb, cta  -p/a-soft, bs+  -cvs-s1s2 normal     A/P    # ct supportive care     #dvt pr
55 yr old male with above history of dizziness, hypotension and near syncope.     #Bradycardia, hypotension/shock  Possibly due to sotalol. Pt is in ccu under constant telemetry  hydrocortisone sodium succinate Injectable 50 milliGRAM(s) IV Push every 6 hours  midodrine 10 milliGRAM(s) Oral every 8 hours  betablocker stopped due to induced hypotension and bradycardia
55M hx HTN BPH, GERD, chronic a fib  on pradaxa, alcoholic cirrhosis, EtOH abuse came to ED [facility] from [facility] rehab for evaluation of  dizziness, hypotension and near syncope.      #Near syncope complicated by shock   - Hypotension at admission could be secondary to BB treatment and possible adrenal insufficiency   - Transitioned Solu-Cortef to Prednisone 40 mg q6H, monitor BP - monitor serum glucose in the setting of steroid use  - Continue Midodrine to 15 mg PO TID -  - On propanolol 20 mg PO BID at home (confirmed by pharmacy), Unable to continue BB due to induced hypotension and bradycardia  - EP recommendations appreciated: started on pyridostigmine 60 mg BID
55 yr old male with above history of dizziness, hypotension and near syncope.     #Bradycardia, hypotension/shock  Possibly due to sotalol. Pt is in ccu under constant telemetry  hydrocortisone sodium succinate Injectable 50 milliGRAM(s) IV Push every 6 hours  midodrine 10 milliGRAM(s) Oral every 8 hours  betablocker stopped due to induced hypotension and bradycardia
55M hx HTN BPH, GERD, chronic a fib  on pradaxa, alcoholic cirrhosis, EtOH abuse came to ED [facility]BEMS from [facility] rehab for evaluation of  dizziness, hypotension and near syncope.      #Near syncope complicated by shock   - Hypotension at admission could be secondary to BB treatment and possible adrenal insufficiency   - Transitioned Solu-Cortef to Prednisone 40 mg q6H, monitor BP   - Continue Midodrine to 15 mg PO TID   - On propanolol 20 mg PO BID at home (confirmed by pharmacy), Unable to continue BB due to induced hypotension and bradycardia  - EP recommendations appreciated: started on pyridostigmine 60 mg BID
As above, pt seen and examined with house staff and treatment plan formulated on rounds    See Above and Attending Note

## 2021-09-27 ENCOUNTER — TRANSCRIPTION ENCOUNTER (OUTPATIENT)
Age: 56
End: 2021-09-27

## 2021-09-27 VITALS — HEART RATE: 105 BPM | DIASTOLIC BLOOD PRESSURE: 105 MMHG | SYSTOLIC BLOOD PRESSURE: 135 MMHG

## 2021-09-27 LAB
ANION GAP SERPL CALC-SCNC: 4 MMOL/L — LOW (ref 5–17)
BUN SERPL-MCNC: 29 MG/DL — HIGH (ref 7–23)
CALCIUM SERPL-MCNC: 8 MG/DL — LOW (ref 8.5–10.1)
CHLORIDE SERPL-SCNC: 108 MMOL/L — SIGNIFICANT CHANGE UP (ref 96–108)
CO2 SERPL-SCNC: 25 MMOL/L — SIGNIFICANT CHANGE UP (ref 22–31)
CREAT SERPL-MCNC: 0.74 MG/DL — SIGNIFICANT CHANGE UP (ref 0.5–1.3)
GLUCOSE SERPL-MCNC: 125 MG/DL — HIGH (ref 70–99)
HCT VFR BLD CALC: 30.7 % — LOW (ref 39–50)
HGB BLD-MCNC: 10.1 G/DL — LOW (ref 13–17)
MCHC RBC-ENTMCNC: 30.5 PG — SIGNIFICANT CHANGE UP (ref 27–34)
MCHC RBC-ENTMCNC: 32.9 GM/DL — SIGNIFICANT CHANGE UP (ref 32–36)
MCV RBC AUTO: 92.7 FL — SIGNIFICANT CHANGE UP (ref 80–100)
PLATELET # BLD AUTO: 122 K/UL — LOW (ref 150–400)
POTASSIUM SERPL-MCNC: 4.6 MMOL/L — SIGNIFICANT CHANGE UP (ref 3.5–5.3)
POTASSIUM SERPL-SCNC: 4.6 MMOL/L — SIGNIFICANT CHANGE UP (ref 3.5–5.3)
RBC # BLD: 3.31 M/UL — LOW (ref 4.2–5.8)
RBC # FLD: 15.7 % — HIGH (ref 10.3–14.5)
SODIUM SERPL-SCNC: 137 MMOL/L — SIGNIFICANT CHANGE UP (ref 135–145)
WBC # BLD: 5.41 K/UL — SIGNIFICANT CHANGE UP (ref 3.8–10.5)
WBC # FLD AUTO: 5.41 K/UL — SIGNIFICANT CHANGE UP (ref 3.8–10.5)

## 2021-09-27 PROCEDURE — 99239 HOSP IP/OBS DSCHRG MGMT >30: CPT

## 2021-09-27 RX ORDER — PYRIDOSTIGMINE BROMIDE 60 MG/5ML
1 SOLUTION ORAL
Qty: 60 | Refills: 0
Start: 2021-09-27 | End: 2021-10-26

## 2021-09-27 RX ORDER — MIDODRINE HYDROCHLORIDE 2.5 MG/1
3 TABLET ORAL
Qty: 90 | Refills: 0
Start: 2021-09-27 | End: 2021-10-06

## 2021-09-27 RX ORDER — TIOTROPIUM BROMIDE 18 UG/1
1 CAPSULE ORAL; RESPIRATORY (INHALATION)
Qty: 30 | Refills: 0
Start: 2021-09-27 | End: 2021-10-26

## 2021-09-27 RX ORDER — DABIGATRAN ETEXILATE MESYLATE 150 MG/1
1 CAPSULE ORAL
Qty: 60 | Refills: 0
Start: 2021-09-27 | End: 2021-10-26

## 2021-09-27 RX ADMIN — TIOTROPIUM BROMIDE 1 CAPSULE(S): 18 CAPSULE ORAL; RESPIRATORY (INHALATION) at 09:03

## 2021-09-27 RX ADMIN — Medication 1 MILLIGRAM(S): at 10:13

## 2021-09-27 RX ADMIN — PANTOPRAZOLE SODIUM 40 MILLIGRAM(S): 20 TABLET, DELAYED RELEASE ORAL at 06:05

## 2021-09-27 RX ADMIN — Medication 100 MILLIGRAM(S): at 10:13

## 2021-09-27 RX ADMIN — Medication 1 TABLET(S): at 10:15

## 2021-09-27 RX ADMIN — LACTULOSE 10 GRAM(S): 10 SOLUTION ORAL at 10:13

## 2021-09-27 RX ADMIN — MIDODRINE HYDROCHLORIDE 15 MILLIGRAM(S): 2.5 TABLET ORAL at 13:30

## 2021-09-27 RX ADMIN — Medication 40 MILLIGRAM(S): at 13:30

## 2021-09-27 RX ADMIN — Medication 40 MILLIGRAM(S): at 06:02

## 2021-09-27 RX ADMIN — PYRIDOSTIGMINE BROMIDE 60 MILLIGRAM(S): 60 SOLUTION ORAL at 10:13

## 2021-09-27 RX ADMIN — MIDODRINE HYDROCHLORIDE 15 MILLIGRAM(S): 2.5 TABLET ORAL at 06:06

## 2021-09-27 RX ADMIN — MIDODRINE HYDROCHLORIDE 15 MILLIGRAM(S): 2.5 TABLET ORAL at 17:15

## 2021-09-27 RX ADMIN — DABIGATRAN ETEXILATE MESYLATE 150 MILLIGRAM(S): 150 CAPSULE ORAL at 10:13

## 2021-09-27 NOTE — DISCHARGE NOTE NURSING/CASE MANAGEMENT/SOCIAL WORK - PATIENT PORTAL LINK FT
You can access the FollowMyHealth Patient Portal offered by [facility] by registering at the following website: [email or web address]. By joining [facility]’s FollowMyHealth portal, you will also be able to view your health information using other applications (apps) compatible with our system.

## 2021-09-27 NOTE — PROGRESS NOTE ADULT - SUBJECTIVE AND OBJECTIVE BOX
55 y.o. Male with PMHx of HTN, BPH, GERD, chronic afib on pradaxa. alcoholic cirrhosis [facility] and bradycardia, likely medication induced for which he was admitted.     INTERVAL HPI: + LE edema, ambulates with PT, tachycardia with exertion  Other ROS reviewed and neg     Vital Signs Last 24 Hrs  T(C): 36.1 (27 Sep 2021 08:00), Max: 36.2 (27 Sep 2021 00:14)  T(F): 97 (27 Sep 2021 08:00), Max: 97.2 (27 Sep 2021 00:14)  HR: 153 (27 Sep 2021 08:00) (83 - 153)  BP: 124/90 (27 Sep 2021 08:00) (91/55 - 127/82)  BP(mean): 98 (27 Sep 2021 08:00) (64 - 98)  RR: 20 (27 Sep 2021 08:00) (12 - 26)  SpO2: 100% (26 Sep 2021 17:30) (100% - 100%)                        10.1   5.41  )-----------( 122      ( 27 Sep 2021 05:40 )             30.7     27 Sep 2021 05:40    137    |  108    |  29     ----------------------------<  125    4.6     |  25     |  0.74     Ca    8.0        27 Sep 2021 05:40    MEDICATIONS  (STANDING):  dabigatran 150 milliGRAM(s) Oral every 12 hours  folic acid 1 milliGRAM(s) Oral daily  lactulose Syrup 10 Gram(s) Oral daily  midodrine. 15 milliGRAM(s) Oral three times a day  multivitamin 1 Tablet(s) Oral daily  pantoprazole    Tablet 40 milliGRAM(s) Oral before breakfast  predniSONE   Tablet 40 milliGRAM(s) Oral three times a day  pyridostigmine 60 milliGRAM(s) Oral two times a day  rifAXIMin 550 milliGRAM(s) Oral two times a day  tamsulosin 0.4 milliGRAM(s) Oral at bedtime  thiamine 100 milliGRAM(s) Oral daily  tiotropium 18 MICROgram(s) Capsule 1 Capsule(s) Inhalation daily    RADIOLOGY: personally visualized    PHYSICAL EXAM:  Constitutional: male in NAD, awake and alert  HEENT: PERR, EOMI, Normal Hearing, MMM  Neck: Soft and supple, No LAD, No JVD  Respiratory: Breath sounds are clear bilaterally, No wheezing, rales or rhonchi  Cardiovascular: S1, S2 irreg  Gastrointestinal: Bowel Sounds present, soft, distended  Extremities: 2+ pitting edema of B/L LE, L>E  Vascular: 2+ peripheral pulses  Neurological: A/O x 3, no focal deficits  Musculoskeletal: 5/5 strength b/l upper and lower extremities

## 2021-09-27 NOTE — PROGRESS NOTE ADULT - NUTRITIONAL ASSESSMENT
This patient has been assessed with a concern for Malnutrition and has been determined to have a diagnosis/diagnoses of Severe protein-calorie malnutrition.    This patient is being managed with:   Diet Regular-  Entered: Sep 13 2021 12:06AM    

## 2021-09-27 NOTE — PROGRESS NOTE ADULT - REASON FOR ADMISSION
Near syncope

## 2021-09-27 NOTE — PROGRESS NOTE ADULT - ASSESSMENT
55M hx HTN BPH, GERD, chronic a fib  on pradaxa, alcoholic cirrhosis, EtOH abuse came to ED [facility] from [facility] rehab for evaluation of  dizziness, hypotension and near syncope.      #Near syncope complicated by hypotensive shock - unclear etiology, potentially iatrogenic   - discussed with Dr. Mckinley - cont po midodrine with mestinone, off BBL   - off BBL due to hypotenstion    #Stress doses of steroids - taper slowly    # BL LE pitting edema - no DVT, evidence of venous insufficiency + steroids induced + underlying Hx of ETOH liver and heart Dx     #Afib - Continue Pradaxa 150 mg PO BID     #Liver cirrhosis - Secondary to EtOH abuse   - Continue rifaximin 550 mg PO BID     #Thrombocytopenia due to ETOH liver Dx    #GERD - Continue Pantoprazole    #Low serum calcium - normal corrected    #Hx of BPH - stop flomax due to SE profile as will contribute to hypotension    Discussed with residents team. Chart reviewed.  Pt is stable for DC home with understanding that need to follow up closely with outpatient providers  Time spent 95 min

## 2021-09-27 NOTE — PROGRESS NOTE ADULT - PROVIDER SPECIALTY LIST ADULT
Critical Care
Electrophysiology
Family Medicine
Hospitalist
Hospitalist
Critical Care
Critical Care
Family Medicine
Critical Care
Critical Care
Electrophysiology
Family Medicine
Hospitalist
Hospitalist
MICU
MICU
Electrophysiology
Family Medicine
Hospitalist
Family Medicine
Family Medicine

## 2021-09-28 RX ORDER — PANTOPRAZOLE SODIUM 20 MG/1
1 TABLET, DELAYED RELEASE ORAL
Qty: 30 | Refills: 0
Start: 2021-09-28 | End: 2021-10-27

## 2021-09-28 RX ORDER — FOLIC ACID 0.8 MG
1 TABLET ORAL
Qty: 30 | Refills: 0
Start: 2021-09-28 | End: 2021-10-27

## 2021-09-29 ENCOUNTER — NON-APPOINTMENT (OUTPATIENT)
Age: 56
End: 2021-09-29

## 2021-09-30 PROBLEM — K70.30 ALCOHOLIC CIRRHOSIS OF LIVER WITHOUT ASCITES: Chronic | Status: ACTIVE | Noted: 2021-09-12

## 2021-09-30 PROBLEM — N17.9 ACUTE KIDNEY FAILURE, UNSPECIFIED: Chronic | Status: ACTIVE | Noted: 2021-09-12

## 2021-09-30 PROBLEM — J98.11 ATELECTASIS: Chronic | Status: ACTIVE | Noted: 2021-09-12

## 2021-09-30 PROBLEM — K21.9 GASTRO-ESOPHAGEAL REFLUX DISEASE WITHOUT ESOPHAGITIS: Chronic | Status: ACTIVE | Noted: 2021-09-12

## 2021-09-30 PROBLEM — I48.91 UNSPECIFIED ATRIAL FIBRILLATION: Chronic | Status: ACTIVE | Noted: 2021-09-12

## 2021-09-30 PROBLEM — D64.9 ANEMIA, UNSPECIFIED: Chronic | Status: ACTIVE | Noted: 2021-09-12

## 2021-09-30 PROBLEM — I95.9 HYPOTENSION, UNSPECIFIED: Chronic | Status: ACTIVE | Noted: 2021-09-12

## 2021-09-30 PROBLEM — E87.1 HYPO-OSMOLALITY AND HYPONATREMIA: Chronic | Status: ACTIVE | Noted: 2021-09-12

## 2021-10-01 ENCOUNTER — TRANSCRIPTION ENCOUNTER (OUTPATIENT)
Age: 56
End: 2021-10-01

## 2021-10-01 DIAGNOSIS — K72.90 HEPATIC FAILURE, UNSPECIFIED WITHOUT COMA: ICD-10-CM

## 2021-10-01 DIAGNOSIS — R00.1 BRADYCARDIA, UNSPECIFIED: ICD-10-CM

## 2021-10-01 DIAGNOSIS — I95.9 HYPOTENSION, UNSPECIFIED: ICD-10-CM

## 2021-10-01 DIAGNOSIS — K21.9 GASTRO-ESOPHAGEAL REFLUX DISEASE WITHOUT ESOPHAGITIS: ICD-10-CM

## 2021-10-01 DIAGNOSIS — R57.8 OTHER SHOCK: ICD-10-CM

## 2021-10-01 DIAGNOSIS — D69.59 OTHER SECONDARY THROMBOCYTOPENIA: ICD-10-CM

## 2021-10-01 DIAGNOSIS — E27.40 UNSPECIFIED ADRENOCORTICAL INSUFFICIENCY: ICD-10-CM

## 2021-10-01 DIAGNOSIS — N17.9 ACUTE KIDNEY FAILURE, UNSPECIFIED: ICD-10-CM

## 2021-10-01 DIAGNOSIS — I48.20 CHRONIC ATRIAL FIBRILLATION, UNSPECIFIED: ICD-10-CM

## 2021-10-01 DIAGNOSIS — E43 UNSPECIFIED SEVERE PROTEIN-CALORIE MALNUTRITION: ICD-10-CM

## 2021-10-01 DIAGNOSIS — K70.30 ALCOHOLIC CIRRHOSIS OF LIVER WITHOUT ASCITES: ICD-10-CM

## 2021-10-01 DIAGNOSIS — R74.9 ABNORMAL SERUM ENZYME LEVEL, UNSPECIFIED: ICD-10-CM

## 2021-10-01 DIAGNOSIS — N40.0 BENIGN PROSTATIC HYPERPLASIA WITHOUT LOWER URINARY TRACT SYMPTOMS: ICD-10-CM

## 2021-10-04 ENCOUNTER — APPOINTMENT (OUTPATIENT)
Dept: FAMILY MEDICINE | Facility: CLINIC | Age: 56
End: 2021-10-04
Payer: COMMERCIAL

## 2021-10-04 VITALS
BODY MASS INDEX: 33.52 KG/M2 | WEIGHT: 214 LBS | DIASTOLIC BLOOD PRESSURE: 86 MMHG | OXYGEN SATURATION: 96 % | TEMPERATURE: 97.3 F | SYSTOLIC BLOOD PRESSURE: 130 MMHG | HEART RATE: 90 BPM

## 2021-10-04 DIAGNOSIS — Z23 ENCOUNTER FOR IMMUNIZATION: ICD-10-CM

## 2021-10-04 PROCEDURE — G0008: CPT

## 2021-10-04 PROCEDURE — 90686 IIV4 VACC NO PRSV 0.5 ML IM: CPT

## 2021-10-04 PROCEDURE — 99214 OFFICE O/P EST MOD 30 MIN: CPT | Mod: 25

## 2021-10-04 NOTE — PLAN
[FreeTextEntry1] : Has follow up appointment with liver specialist and cardiology and renal\par Med list updated

## 2021-10-04 NOTE — HISTORY OF PRESENT ILLNESS
[FreeTextEntry1] : patient here for follow up and flu shot. [de-identified] : Pt was in hospital and rehab, had severe ETOH cirrhosis\par Has chronic ascitis and generalized edema

## 2021-10-04 NOTE — PHYSICAL EXAM
[Ill-Appearing] : ill-appearing [Normal] : normal rate, regular rhythm, normal S1 and S2 and no murmur heard [No Carotid Bruits] : no carotid bruits [Coordination Grossly Intact] : coordination grossly intact [Alert and Oriented x3] : oriented to person, place, and time [de-identified] : abdomin enlarged and  ascites

## 2021-10-05 RX ORDER — SULFAMETHOXAZOLE AND TRIMETHOPRIM 800; 160 MG/1; MG/1
800-160 TABLET ORAL
Qty: 14 | Refills: 0 | Status: DISCONTINUED | COMMUNITY
Start: 2021-04-12

## 2021-10-05 RX ORDER — PROPRANOLOL HYDROCHLORIDE 20 MG/1
20 TABLET ORAL
Qty: 180 | Refills: 0 | Status: DISCONTINUED | COMMUNITY
Start: 2020-09-14

## 2021-10-05 RX ORDER — DIAZEPAM 5 MG/1
5 TABLET ORAL
Qty: 2 | Refills: 0 | Status: DISCONTINUED | COMMUNITY
Start: 2021-07-17

## 2021-10-05 RX ORDER — DEXLANSOPRAZOLE 30 MG/1
30 CAPSULE, DELAYED RELEASE ORAL
Refills: 0 | Status: DISCONTINUED | COMMUNITY
Start: 2021-01-19 | End: 2021-10-05

## 2021-10-05 RX ORDER — PROPRANOLOL HYDROCHLORIDE 10 MG/1
10 TABLET ORAL TWICE DAILY
Refills: 0 | Status: DISCONTINUED | COMMUNITY
Start: 2021-01-19 | End: 2021-10-05

## 2021-10-05 RX ORDER — DIGOXIN 125 UG/1
125 TABLET ORAL DAILY
Refills: 0 | Status: DISCONTINUED | COMMUNITY
Start: 2019-05-09 | End: 2021-10-05

## 2021-10-05 RX ORDER — LISINOPRIL AND HYDROCHLOROTHIAZIDE TABLETS 20; 12.5 MG/1; MG/1
20-12.5 TABLET ORAL DAILY
Refills: 0 | Status: DISCONTINUED | COMMUNITY
Start: 2019-05-09 | End: 2021-10-05

## 2021-10-05 RX ORDER — URSODIOL 500 MG/1
500 TABLET ORAL
Qty: 60 | Refills: 0 | Status: ACTIVE | COMMUNITY
Start: 2021-07-01

## 2021-10-05 RX ORDER — PYRIDOSTIGMINE BROMIDE 60 MG/1
60 TABLET ORAL
Qty: 60 | Refills: 0 | Status: ACTIVE | COMMUNITY
Start: 2021-09-27

## 2021-10-05 RX ORDER — MULTIVITAMIN
TABLET ORAL
Refills: 0 | Status: DISCONTINUED | COMMUNITY
End: 2021-10-05

## 2021-10-15 ENCOUNTER — NON-APPOINTMENT (OUTPATIENT)
Age: 56
End: 2021-10-15

## 2021-10-18 ENCOUNTER — NON-APPOINTMENT (OUTPATIENT)
Age: 56
End: 2021-10-18

## 2021-10-19 ENCOUNTER — APPOINTMENT (OUTPATIENT)
Dept: FAMILY MEDICINE | Facility: CLINIC | Age: 56
End: 2021-10-19
Payer: COMMERCIAL

## 2021-10-19 VITALS
DIASTOLIC BLOOD PRESSURE: 80 MMHG | WEIGHT: 214 LBS | TEMPERATURE: 97.1 F | HEART RATE: 97 BPM | SYSTOLIC BLOOD PRESSURE: 110 MMHG | OXYGEN SATURATION: 86 % | BODY MASS INDEX: 33.52 KG/M2

## 2021-10-19 DIAGNOSIS — I87.2 VENOUS INSUFFICIENCY (CHRONIC) (PERIPHERAL): ICD-10-CM

## 2021-10-19 DIAGNOSIS — Z79.01 LONG TERM (CURRENT) USE OF ANTICOAGULANTS: ICD-10-CM

## 2021-10-19 DIAGNOSIS — I73.9 PERIPHERAL VASCULAR DISEASE, UNSPECIFIED: ICD-10-CM

## 2021-10-19 DIAGNOSIS — L03.90 CELLULITIS, UNSPECIFIED: ICD-10-CM

## 2021-10-19 DIAGNOSIS — I48.91 UNSPECIFIED ATRIAL FIBRILLATION: ICD-10-CM

## 2021-10-19 PROCEDURE — 99214 OFFICE O/P EST MOD 30 MIN: CPT

## 2021-10-19 RX ORDER — SILVER SULFADIAZINE 10 MG/G
1 CREAM TOPICAL TWICE DAILY
Qty: 1 | Refills: 2 | Status: ACTIVE | COMMUNITY
Start: 2021-10-19 | End: 1900-01-01

## 2021-10-21 PROBLEM — I87.2 STASIS DERMATITIS: Status: ACTIVE | Noted: 2021-10-21

## 2021-10-21 NOTE — REVIEW OF SYSTEMS
[Palpitations] : palpitations [Dyspnea on Exertion] : dyspnea on exertion [Abdominal Pain] : abdominal pain [Nausea] : nausea [Skin Rash] : skin rash [Dizziness] : dizziness [Anxiety] : anxiety [Negative] : Genitourinary [Fever] : no fever [Chills] : no chills [Fatigue] : no fatigue [Hot Flashes] : no hot flashes [Night Sweats] : no night sweats [Recent Change In Weight] : ~T no recent weight change [Chest Pain] : no chest pain [Claudication] : no  leg claudication [Lower Ext Edema] : no lower extremity edema [Orthopena] : no orthopnea [Paroxysmal Nocturnal Dyspnea] : no paroxysmal nocturnal dyspnea [Shortness Of Breath] : no shortness of breath [Wheezing] : no wheezing [Cough] : no cough [Constipation] : no constipation [Vomiting] : no vomiting [Heartburn] : no heartburn [Melena] : no melena [Easy Bruising] : no easy bruising [FreeTextEntry3] : states they ere yellow last week, watery [FreeTextEntry5] : rapid pulse [de-identified] : le edema and redness

## 2021-10-21 NOTE — PLAN
[FreeTextEntry1] : Has follow up appointment with liver specialist and cardiology and renal\par Med list updated\par Pt will be seeing vascular next week and GI for ascites

## 2021-10-21 NOTE — HISTORY OF PRESENT ILLNESS
[FreeTextEntry1] : patient here for a follow up. [de-identified] : Pt was in hospital and rehab, had severe ETOH cirrhosis\par Has chronic ascitis and generalized edema\par PVD and will see vascular next week

## 2021-10-21 NOTE — PHYSICAL EXAM
[Ill-Appearing] : ill-appearing [Normal] : normal rate, regular rhythm, normal S1 and S2 and no murmur heard [No Carotid Bruits] : no carotid bruits [Coordination Grossly Intact] : coordination grossly intact [Alert and Oriented x3] : oriented to person, place, and time [de-identified] : abdomin enlarged and  ascites [de-identified] : LE stasis dermatitis and healing abrasion

## 2021-11-04 ENCOUNTER — LABORATORY RESULT (OUTPATIENT)
Age: 56
End: 2021-11-04

## 2021-11-08 LAB
ALBUMIN SERPL ELPH-MCNC: 2.9 G/DL
ALP BLD-CCNC: 73 U/L
ALT SERPL-CCNC: 6 U/L
AMYLASE/CREAT SERPL: 138 U/L
ANION GAP SERPL CALC-SCNC: 13 MMOL/L
AST SERPL-CCNC: 33 U/L
BASOPHILS # BLD AUTO: 0.09 K/UL
BASOPHILS NFR BLD AUTO: 1.2 %
BILIRUB SERPL-MCNC: 0.8 MG/DL
BUN SERPL-MCNC: 21 MG/DL
CALCIUM SERPL-MCNC: 8.6 MG/DL
CHLORIDE SERPL-SCNC: 88 MMOL/L
CHOLEST SERPL-MCNC: 128 MG/DL
CO2 SERPL-SCNC: 26 MMOL/L
CREAT SERPL-MCNC: 1.32 MG/DL
EOSINOPHIL # BLD AUTO: 0.04 K/UL
EOSINOPHIL NFR BLD AUTO: 0.5 %
ESTIMATED AVERAGE GLUCOSE: 103 MG/DL
GLUCOSE SERPL-MCNC: 106 MG/DL
HBA1C MFR BLD HPLC: 5.2 %
HCT VFR BLD CALC: 29.7 %
HDLC SERPL-MCNC: 32 MG/DL
HGB BLD-MCNC: 9.9 G/DL
IMM GRANULOCYTES NFR BLD AUTO: 1.9 %
INR PPP: 2.6 RATIO
LDLC SERPL CALC-MCNC: 80 MG/DL
LYMPHOCYTES # BLD AUTO: 1.74 K/UL
LYMPHOCYTES NFR BLD AUTO: 22.3 %
MAN DIFF?: NORMAL
MCHC RBC-ENTMCNC: 28.5 PG
MCHC RBC-ENTMCNC: 33.3 GM/DL
MCV RBC AUTO: 85.6 FL
MONOCYTES # BLD AUTO: 1.14 K/UL
MONOCYTES NFR BLD AUTO: 14.6 %
NEUTROPHILS # BLD AUTO: 4.64 K/UL
NEUTROPHILS NFR BLD AUTO: 59.5 %
NONHDLC SERPL-MCNC: 96 MG/DL
PLATELET # BLD AUTO: 253 K/UL
POTASSIUM SERPL-SCNC: 4 MMOL/L
PROT SERPL-MCNC: 6.1 G/DL
PT BLD: 29.3 SEC
RBC # BLD: 3.47 M/UL
RBC # FLD: 14.6 %
SODIUM SERPL-SCNC: 126 MMOL/L
TRIGL SERPL-MCNC: 79 MG/DL
TSH SERPL-ACNC: 3.47 UIU/ML
WBC # FLD AUTO: 7.8 K/UL

## 2021-11-15 RX ORDER — DABIGATRAN ETEXILATE MESYLATE 150 MG/1
150 CAPSULE ORAL
Qty: 180 | Refills: 1 | Status: DISCONTINUED | COMMUNITY
Start: 2017-01-18 | End: 2021-11-15

## 2021-11-15 RX ORDER — WARFARIN 3 MG/1
3 TABLET ORAL DAILY
Qty: 90 | Refills: 1 | Status: ACTIVE | COMMUNITY
Start: 2021-11-15 | End: 1900-01-01

## 2021-11-23 ENCOUNTER — APPOINTMENT (OUTPATIENT)
Dept: FAMILY MEDICINE | Facility: CLINIC | Age: 56
End: 2021-11-23
Payer: COMMERCIAL

## 2021-11-23 VITALS
SYSTOLIC BLOOD PRESSURE: 96 MMHG | WEIGHT: 214 LBS | DIASTOLIC BLOOD PRESSURE: 66 MMHG | HEART RATE: 84 BPM | TEMPERATURE: 97 F | BODY MASS INDEX: 33.52 KG/M2 | OXYGEN SATURATION: 99 %

## 2021-11-23 DIAGNOSIS — K70.31 ALCOHOLIC CIRRHOSIS OF LIVER WITH ASCITES: ICD-10-CM

## 2021-11-23 DIAGNOSIS — I50.9 HEART FAILURE, UNSPECIFIED: ICD-10-CM

## 2021-11-23 DIAGNOSIS — I42.9 CARDIOMYOPATHY, UNSPECIFIED: ICD-10-CM

## 2021-11-23 PROCEDURE — 99214 OFFICE O/P EST MOD 30 MIN: CPT

## 2021-11-23 RX ORDER — NADOLOL 40 MG/1
40 TABLET ORAL TWICE DAILY
Qty: 180 | Refills: 2 | Status: ACTIVE | COMMUNITY
Start: 2021-11-15 | End: 1900-01-01

## 2021-11-25 PROBLEM — I42.9 CARDIOMYOPATHY: Status: ACTIVE | Noted: 2019-05-09

## 2021-11-25 PROBLEM — I50.9 CHRONIC CHF: Status: ACTIVE | Noted: 2019-10-10

## 2021-11-25 PROBLEM — K70.31 ALCOHOLIC CIRRHOSIS OF LIVER WITH ASCITES: Status: ACTIVE | Noted: 2020-12-14

## 2021-11-25 NOTE — REVIEW OF SYSTEMS
[Palpitations] : palpitations [Dyspnea on Exertion] : dyspnea on exertion [Abdominal Pain] : abdominal pain [Nausea] : nausea [Skin Rash] : skin rash [Dizziness] : dizziness [Anxiety] : anxiety [Negative] : Genitourinary [Fever] : no fever [Chills] : no chills [Fatigue] : no fatigue [Hot Flashes] : no hot flashes [Night Sweats] : no night sweats [Recent Change In Weight] : ~T no recent weight change [Chest Pain] : no chest pain [Claudication] : no  leg claudication [Lower Ext Edema] : no lower extremity edema [Orthopena] : no orthopnea [Paroxysmal Nocturnal Dyspnea] : no paroxysmal nocturnal dyspnea [Shortness Of Breath] : no shortness of breath [Wheezing] : no wheezing [Cough] : no cough [Constipation] : no constipation [Vomiting] : no vomiting [Heartburn] : no heartburn [Melena] : no melena [Easy Bruising] : no easy bruising [FreeTextEntry3] : states they ere yellow last week, watery [FreeTextEntry5] : rapid pulse [de-identified] : le edema and redness

## 2021-11-25 NOTE — HISTORY OF PRESENT ILLNESS
[FreeTextEntry1] : patient here for med refill [de-identified] : Pt was in hospital and rehab, had severe ETOH cirrhosis\par Has chronic ascitis and generalized edema\par PVD and will see vascular

## 2021-12-01 ENCOUNTER — LABORATORY RESULT (OUTPATIENT)
Age: 56
End: 2021-12-01

## 2021-12-21 RX ORDER — SPIRONOLACTONE 100 MG/1
100 TABLET ORAL DAILY
Qty: 90 | Refills: 0 | Status: ACTIVE | COMMUNITY
Start: 2021-01-19 | End: 1900-01-01

## 2021-12-21 RX ORDER — LACTULOSE 10 G/15ML
10 SOLUTION ORAL TWICE DAILY
Qty: 1 | Refills: 1 | Status: ACTIVE | COMMUNITY
Start: 2021-01-19 | End: 1900-01-01

## 2021-12-21 RX ORDER — FUROSEMIDE 80 MG/1
80 TABLET ORAL
Qty: 90 | Refills: 0 | Status: ACTIVE | COMMUNITY
Start: 2021-01-19 | End: 1900-01-01

## 2021-12-29 ENCOUNTER — LABORATORY RESULT (OUTPATIENT)
Age: 56
End: 2021-12-29

## 2022-01-26 ENCOUNTER — TRANSCRIPTION ENCOUNTER (OUTPATIENT)
Age: 57
End: 2022-01-26

## 2022-01-26 ENCOUNTER — LABORATORY RESULT (OUTPATIENT)
Age: 57
End: 2022-01-26

## 2022-03-08 ENCOUNTER — APPOINTMENT (OUTPATIENT)
Dept: FAMILY MEDICINE | Facility: CLINIC | Age: 57
End: 2022-03-08

## 2022-03-18 RX ORDER — FOLIC ACID 1 MG/1
1 TABLET ORAL
Qty: 90 | Refills: 1 | Status: ACTIVE | COMMUNITY
Start: 2021-07-20 | End: 1900-01-01

## 2022-04-08 ENCOUNTER — NON-APPOINTMENT (OUTPATIENT)
Age: 57
End: 2022-04-08

## 2022-04-12 DIAGNOSIS — G40.909 EPILEPSY, UNSPECIFIED, NOT INTRACTABLE, W/OUT STATUS EPILEPTICUS: ICD-10-CM

## 2022-04-12 RX ORDER — CHOLECALCIFEROL (VITAMIN D3) 50 MCG
100 CAPSULE ORAL
Refills: 0 | Status: DISCONTINUED | COMMUNITY
Start: 2021-01-19 | End: 2022-04-12

## 2022-04-12 RX ORDER — LEVETIRACETAM 250 MG/1
250 TABLET, FILM COATED ORAL DAILY
Qty: 60 | Refills: 1 | Status: ACTIVE | COMMUNITY
Start: 2022-04-12 | End: 1900-01-01

## 2022-04-12 RX ORDER — DILTIAZEM HYDROCHLORIDE 120 MG/1
120 CAPSULE, EXTENDED RELEASE ORAL DAILY
Qty: 30 | Refills: 1 | Status: ACTIVE | COMMUNITY
Start: 2022-04-12 | End: 1900-01-01

## 2022-04-12 RX ORDER — TIOTROPIUM BROMIDE 18 UG/1
18 CAPSULE ORAL; RESPIRATORY (INHALATION) DAILY
Qty: 30 | Refills: 5 | Status: DISCONTINUED | COMMUNITY
Start: 2021-09-27 | End: 2022-04-12

## 2022-04-12 RX ORDER — PANTOPRAZOLE 40 MG/1
40 TABLET, DELAYED RELEASE ORAL
Qty: 60 | Refills: 1 | Status: ACTIVE | COMMUNITY
Start: 2021-09-28 | End: 1900-01-01

## 2022-04-12 RX ORDER — MIDODRINE HYDROCHLORIDE 5 MG/1
5 TABLET ORAL TWICE DAILY
Qty: 180 | Refills: 0 | Status: DISCONTINUED | COMMUNITY
Start: 2021-09-27 | End: 2022-04-12

## 2022-04-19 ENCOUNTER — APPOINTMENT (OUTPATIENT)
Dept: FAMILY MEDICINE | Facility: CLINIC | Age: 57
End: 2022-04-19

## 2022-07-19 NOTE — ED PROVIDER NOTE - NS ED ATTENDING STATEMENT MOD
Satisfactory I have personally provided the amount of critical care time documented below excluding time spent on separate procedures.

## 2022-08-03 ENCOUNTER — NON-APPOINTMENT (OUTPATIENT)
Age: 57
End: 2022-08-03

## 2022-08-04 NOTE — PATIENT PROFILE ADULT - STATED REASON FOR ADMISSION
How Severe Is Your Skin Lesion?: moderate Have Your Skin Lesions Been Treated?: not been treated Is This A New Presentation, Or A Follow-Up?: Skin Lesions Which Family Member (Optional)?: Sister bradycardia

## 2022-10-14 ENCOUNTER — TRANSCRIPTION ENCOUNTER (OUTPATIENT)
Age: 57
End: 2022-10-14

## 2022-11-01 NOTE — PHYSICAL EXAM
[Ill-Appearing] : ill-appearing [Normal] : normal rate, regular rhythm, normal S1 and S2 and no murmur heard [No Carotid Bruits] : no carotid bruits [Coordination Grossly Intact] : coordination grossly intact [Alert and Oriented x3] : oriented to person, place, and time [de-identified] : abdomin enlarged and  ascites [de-identified] : LE stasis dermatitis and healing abrasion h/o migraine headache/headache